# Patient Record
Sex: MALE | Race: WHITE | NOT HISPANIC OR LATINO
[De-identification: names, ages, dates, MRNs, and addresses within clinical notes are randomized per-mention and may not be internally consistent; named-entity substitution may affect disease eponyms.]

---

## 2017-05-09 ENCOUNTER — APPOINTMENT (OUTPATIENT)
Dept: OTOLARYNGOLOGY | Facility: CLINIC | Age: 67
End: 2017-05-09

## 2017-05-09 VITALS
DIASTOLIC BLOOD PRESSURE: 81 MMHG | RESPIRATION RATE: 16 BRPM | WEIGHT: 187 LBS | HEIGHT: 69 IN | SYSTOLIC BLOOD PRESSURE: 138 MMHG | BODY MASS INDEX: 27.7 KG/M2 | HEART RATE: 71 BPM

## 2017-05-09 RX ORDER — MULTIVIT-MIN/FA/LYCOPEN/LUTEIN .4-300-25
TABLET ORAL
Refills: 0 | Status: ACTIVE | COMMUNITY

## 2017-05-09 RX ORDER — CLOBETASOL PROPIONATE 0.05 G/100ML
0.05 LOTION TOPICAL
Refills: 0 | Status: ACTIVE | COMMUNITY

## 2017-05-10 ENCOUNTER — FORM ENCOUNTER (OUTPATIENT)
Age: 67
End: 2017-05-10

## 2017-05-11 ENCOUNTER — APPOINTMENT (OUTPATIENT)
Dept: CT IMAGING | Facility: CLINIC | Age: 67
End: 2017-05-11

## 2017-05-11 ENCOUNTER — OUTPATIENT (OUTPATIENT)
Dept: OUTPATIENT SERVICES | Facility: HOSPITAL | Age: 67
LOS: 1 days | End: 2017-05-11
Payer: MEDICARE

## 2017-05-11 DIAGNOSIS — Z98.52 VASECTOMY STATUS: Chronic | ICD-10-CM

## 2017-05-11 DIAGNOSIS — C06.9 MALIGNANT NEOPLASM OF MOUTH, UNSPECIFIED: ICD-10-CM

## 2017-05-11 DIAGNOSIS — Z98.89 OTHER SPECIFIED POSTPROCEDURAL STATES: Chronic | ICD-10-CM

## 2017-05-11 PROCEDURE — 82565 ASSAY OF CREATININE: CPT

## 2017-05-11 PROCEDURE — 70491 CT SOFT TISSUE NECK W/DYE: CPT

## 2017-05-23 ENCOUNTER — APPOINTMENT (OUTPATIENT)
Dept: OTOLARYNGOLOGY | Facility: CLINIC | Age: 67
End: 2017-05-23

## 2017-05-23 VITALS
HEART RATE: 75 BPM | HEIGHT: 69 IN | SYSTOLIC BLOOD PRESSURE: 118 MMHG | DIASTOLIC BLOOD PRESSURE: 72 MMHG | RESPIRATION RATE: 16 BRPM | BODY MASS INDEX: 27.7 KG/M2 | WEIGHT: 187 LBS

## 2017-05-24 ENCOUNTER — OUTPATIENT (OUTPATIENT)
Dept: OUTPATIENT SERVICES | Facility: HOSPITAL | Age: 67
LOS: 1 days | End: 2017-05-24
Payer: MEDICARE

## 2017-05-24 VITALS
HEART RATE: 60 BPM | RESPIRATION RATE: 14 BRPM | SYSTOLIC BLOOD PRESSURE: 120 MMHG | TEMPERATURE: 97 F | HEIGHT: 67.75 IN | WEIGHT: 186.07 LBS | DIASTOLIC BLOOD PRESSURE: 68 MMHG

## 2017-05-24 DIAGNOSIS — C06.9 MALIGNANT NEOPLASM OF MOUTH, UNSPECIFIED: ICD-10-CM

## 2017-05-24 DIAGNOSIS — Z98.89 OTHER SPECIFIED POSTPROCEDURAL STATES: Chronic | ICD-10-CM

## 2017-05-24 DIAGNOSIS — Z98.890 OTHER SPECIFIED POSTPROCEDURAL STATES: Chronic | ICD-10-CM

## 2017-05-24 DIAGNOSIS — Z98.52 VASECTOMY STATUS: Chronic | ICD-10-CM

## 2017-05-24 DIAGNOSIS — R06.83 SNORING: ICD-10-CM

## 2017-05-24 DIAGNOSIS — Z91.040 LATEX ALLERGY STATUS: ICD-10-CM

## 2017-05-24 LAB
BUN SERPL-MCNC: 15 MG/DL — SIGNIFICANT CHANGE UP (ref 7–23)
CALCIUM SERPL-MCNC: 9.3 MG/DL — SIGNIFICANT CHANGE UP (ref 8.4–10.5)
CHLORIDE SERPL-SCNC: 101 MMOL/L — SIGNIFICANT CHANGE UP (ref 98–107)
CO2 SERPL-SCNC: 28 MMOL/L — SIGNIFICANT CHANGE UP (ref 22–31)
CREAT SERPL-MCNC: 0.88 MG/DL — SIGNIFICANT CHANGE UP (ref 0.5–1.3)
GLUCOSE SERPL-MCNC: 80 MG/DL — SIGNIFICANT CHANGE UP (ref 70–99)
HCT VFR BLD CALC: 44.4 % — SIGNIFICANT CHANGE UP (ref 39–50)
HGB BLD-MCNC: 14.1 G/DL — SIGNIFICANT CHANGE UP (ref 13–17)
MCHC RBC-ENTMCNC: 29.4 PG — SIGNIFICANT CHANGE UP (ref 27–34)
MCHC RBC-ENTMCNC: 31.8 % — LOW (ref 32–36)
MCV RBC AUTO: 92.7 FL — SIGNIFICANT CHANGE UP (ref 80–100)
PLATELET # BLD AUTO: 201 K/UL — SIGNIFICANT CHANGE UP (ref 150–400)
PMV BLD: 11.3 FL — SIGNIFICANT CHANGE UP (ref 7–13)
POTASSIUM SERPL-MCNC: 4.2 MMOL/L — SIGNIFICANT CHANGE UP (ref 3.5–5.3)
POTASSIUM SERPL-SCNC: 4.2 MMOL/L — SIGNIFICANT CHANGE UP (ref 3.5–5.3)
RBC # BLD: 4.79 M/UL — SIGNIFICANT CHANGE UP (ref 4.2–5.8)
RBC # FLD: 15 % — HIGH (ref 10.3–14.5)
SODIUM SERPL-SCNC: 143 MMOL/L — SIGNIFICANT CHANGE UP (ref 135–145)
WBC # BLD: 6.53 K/UL — SIGNIFICANT CHANGE UP (ref 3.8–10.5)
WBC # FLD AUTO: 6.53 K/UL — SIGNIFICANT CHANGE UP (ref 3.8–10.5)

## 2017-05-24 PROCEDURE — 93010 ELECTROCARDIOGRAM REPORT: CPT

## 2017-05-24 NOTE — H&P PST ADULT - LYMPHATIC
posterior cervical R/supraclavicular R/posterior cervical L/anterior cervical L/supraclavicular L/anterior cervical R

## 2017-05-24 NOTE — H&P PST ADULT - NEGATIVE GENERAL GENITOURINARY SYMPTOMS
no renal colic/no flank pain R/normal urinary frequency/no bladder infections/no dysuria/no flank pain L

## 2017-05-24 NOTE — H&P PST ADULT - HISTORY OF PRESENT ILLNESS
67y/o male presents for preop eval for scheduled excision of lesion of submucosa on 6/7/2017.  Pt states self detected few months ago, had it evaluated by dentist and was referred to ENT.  Recent biopsy positive for malignancy.

## 2017-05-24 NOTE — H&P PST ADULT - PROBLEM SELECTOR PLAN 1
scheduled excision of lesion of submucosa on 6/7/2017.   preop instruction, gi prophylaxis given  pt verbalized understanding

## 2017-05-24 NOTE — H&P PST ADULT - NSANTHOSAYNRD_GEN_A_CORE
No. SEBASTIEN screening performed.  STOP BANG Legend: 0-2 = LOW Risk; 3-4 = INTERMEDIATE Risk; 5-8 = HIGH Risk

## 2017-05-24 NOTE — H&P PST ADULT - FAMILY HISTORY
Father  Still living? No  Family history of Parkinson's disease, Age at diagnosis: Age Unknown     Sibling  Still living? Yes, Estimated age: 51-60  Family history of leukemia, Age at diagnosis: Age Unknown

## 2017-06-07 ENCOUNTER — RESULT REVIEW (OUTPATIENT)
Age: 67
End: 2017-06-07

## 2017-06-07 ENCOUNTER — APPOINTMENT (OUTPATIENT)
Dept: OTOLARYNGOLOGY | Facility: HOSPITAL | Age: 67
End: 2017-06-07

## 2017-06-07 ENCOUNTER — INPATIENT (INPATIENT)
Facility: HOSPITAL | Age: 67
LOS: 0 days | Discharge: HOME | End: 2017-06-08
Attending: OTOLARYNGOLOGY | Admitting: OTOLARYNGOLOGY
Payer: MEDICARE

## 2017-06-07 VITALS
HEART RATE: 69 BPM | OXYGEN SATURATION: 96 % | TEMPERATURE: 37 F | DIASTOLIC BLOOD PRESSURE: 74 MMHG | RESPIRATION RATE: 14 BRPM | SYSTOLIC BLOOD PRESSURE: 140 MMHG | HEIGHT: 67.75 IN | WEIGHT: 186.07 LBS

## 2017-06-07 DIAGNOSIS — C06.9 MALIGNANT NEOPLASM OF MOUTH, UNSPECIFIED: ICD-10-CM

## 2017-06-07 DIAGNOSIS — Z98.89 OTHER SPECIFIED POSTPROCEDURAL STATES: Chronic | ICD-10-CM

## 2017-06-07 DIAGNOSIS — Z98.890 OTHER SPECIFIED POSTPROCEDURAL STATES: Chronic | ICD-10-CM

## 2017-06-07 DIAGNOSIS — Z98.52 VASECTOMY STATUS: Chronic | ICD-10-CM

## 2017-06-07 PROCEDURE — 88307 TISSUE EXAM BY PATHOLOGIST: CPT | Mod: 26

## 2017-06-07 PROCEDURE — 88332 PATH CONSLTJ SURG EA ADD BLK: CPT | Mod: 26

## 2017-06-07 PROCEDURE — 40810 EXCISION OF MOUTH LESION: CPT | Mod: GC

## 2017-06-07 PROCEDURE — 15120 SPLT AGRFT F/S/N/H/F/G/M 1ST: CPT | Mod: LT,GC

## 2017-06-07 PROCEDURE — 88331 PATH CONSLTJ SURG 1 BLK 1SPC: CPT | Mod: 26

## 2017-06-07 RX ORDER — ACETAMINOPHEN 500 MG
650 TABLET ORAL EVERY 6 HOURS
Qty: 0 | Refills: 0 | Status: DISCONTINUED | OUTPATIENT
Start: 2017-06-07 | End: 2017-06-08

## 2017-06-07 RX ORDER — OXYCODONE HYDROCHLORIDE 5 MG/1
1 TABLET ORAL
Qty: 0 | Refills: 0 | DISCHARGE
Start: 2017-06-07

## 2017-06-07 RX ORDER — SODIUM CHLORIDE 9 MG/ML
1000 INJECTION, SOLUTION INTRAVENOUS
Qty: 0 | Refills: 0 | Status: DISCONTINUED | OUTPATIENT
Start: 2017-06-07 | End: 2017-06-08

## 2017-06-07 RX ORDER — ONDANSETRON 8 MG/1
4 TABLET, FILM COATED ORAL ONCE
Qty: 0 | Refills: 0 | Status: DISCONTINUED | OUTPATIENT
Start: 2017-06-07 | End: 2017-06-07

## 2017-06-07 RX ORDER — HYDROMORPHONE HYDROCHLORIDE 2 MG/ML
0.5 INJECTION INTRAMUSCULAR; INTRAVENOUS; SUBCUTANEOUS
Qty: 0 | Refills: 0 | Status: DISCONTINUED | OUTPATIENT
Start: 2017-06-07 | End: 2017-06-07

## 2017-06-07 RX ORDER — HYDROMORPHONE HYDROCHLORIDE 2 MG/ML
0.25 INJECTION INTRAMUSCULAR; INTRAVENOUS; SUBCUTANEOUS
Qty: 0 | Refills: 0 | Status: DISCONTINUED | OUTPATIENT
Start: 2017-06-07 | End: 2017-06-07

## 2017-06-07 RX ORDER — OXYCODONE HYDROCHLORIDE 5 MG/1
1 TABLET ORAL
Qty: 42 | Refills: 0
Start: 2017-06-07 | End: 2017-06-14

## 2017-06-07 RX ORDER — SODIUM CHLORIDE 9 MG/ML
1000 INJECTION, SOLUTION INTRAVENOUS
Qty: 0 | Refills: 0 | Status: DISCONTINUED | OUTPATIENT
Start: 2017-06-07 | End: 2017-06-07

## 2017-06-07 RX ORDER — OXYCODONE HYDROCHLORIDE 5 MG/1
5 TABLET ORAL ONCE
Qty: 0 | Refills: 0 | Status: DISCONTINUED | OUTPATIENT
Start: 2017-06-07 | End: 2017-06-08

## 2017-06-07 RX ORDER — GABAPENTIN 400 MG/1
300 CAPSULE ORAL AT BEDTIME
Qty: 0 | Refills: 0 | Status: DISCONTINUED | OUTPATIENT
Start: 2017-06-07 | End: 2017-06-08

## 2017-06-07 RX ADMIN — SODIUM CHLORIDE 100 MILLILITER(S): 9 INJECTION, SOLUTION INTRAVENOUS at 23:13

## 2017-06-07 RX ADMIN — GABAPENTIN 300 MILLIGRAM(S): 400 CAPSULE ORAL at 23:13

## 2017-06-07 RX ADMIN — SODIUM CHLORIDE 100 MILLILITER(S): 9 INJECTION, SOLUTION INTRAVENOUS at 19:03

## 2017-06-07 RX ADMIN — SODIUM CHLORIDE 100 MILLILITER(S): 9 INJECTION, SOLUTION INTRAVENOUS at 16:15

## 2017-06-07 NOTE — DISCHARGE NOTE ADULT - MEDICATION SUMMARY - MEDICATIONS TO TAKE
I will START or STAY ON the medications listed below when I get home from the hospital:    glucosamine/chondrotin 1500 mg/1200 mg 1 cap orally daily last dose 5/31  --     -- Indication: For Home    acetaminophen-oxycodone 325 mg-5 mg oral tablet  -- 1 tab(s) by mouth every 4 hours, As needed, Moderate Pain (4 - 6)  -- Indication: For pain    acetaminophen-oxycodone 325 mg-5 mg oral tablet  -- 1 tab(s) by mouth every 4 hours, As needed, Moderate Pain (4 - 6) MDD:6 tab  -- Indication: For pain    aspirin 81 mg oral tablet  -- 1 tab(s) by mouth once a day  last dose 5/31  -- Indication: For Home    Neurontin 300 mg oral capsule  -- 1 cap(s) by mouth once a day in pm  -- Indication: For Home    clobetasol 0.05% topical cream  -- Apply on skin to affected area 2 times a day, As Needed  -- Indication: For Home    L-Arginine 1000 mg oral tablet  -- 1 tab(s) by mouth once a day last dose 5/31  -- Indication: For Home    Vitamin B-12 1000 mcg oral tablet  -- 1 tab(s) by mouth once a day  -- Indication: For Home

## 2017-06-07 NOTE — DISCHARGE NOTE ADULT - INSTRUCTIONS
Please NOTIFY MD for any of the following s/s: S/S infection (Fever >100.4, chills, increased redness, increased bleeding, pus-like drainage from incision line), uncontrolled pain not relieved by pain medications, persistent nausea/vomiting or inability to tolerate diet. No heavy lifting; No driving while taking narcotic pain medications. Please drink 6-8 glasses of water daily to stay hydrated.

## 2017-06-07 NOTE — DISCHARGE NOTE ADULT - CARE PLAN
Principal Discharge DX:	Malignant neoplasm of mouth, unspecified  Goal:	resect malignancy  Instructions for follow-up, activity and diet:	full liquid diet for 1 week; no straws  gently gargle with salt water twice daily

## 2017-06-07 NOTE — PATIENT PROFILE ADULT. - VISION (WITH CORRECTIVE LENSES IF THE PATIENT USUALLY WEARS THEM):
Normal vision: sees adequately in most situations; can see medication labels, newsprint wears glasses/Normal vision: sees adequately in most situations; can see medication labels, newsprint

## 2017-06-07 NOTE — DISCHARGE NOTE ADULT - CARE PROVIDER_API CALL
Cliff Perez), [facility]  [address] [place] [address]  Phone: [phone]  Fax: [phone]

## 2017-06-07 NOTE — DISCHARGE NOTE ADULT - CONDITIONS AT DISCHARGE
Pt A&Ox4. Vs stable. Pt OOB, ambulating, and voiding adequately. Pain well controlled. Patient is alert and oriented to person, place, time and situation. Patient VS were stable. Patient is voiding. Left upper leg tegaderm in place. Mid lower inner lip incision. IV is DC'd. Patient denies pain. Patient discharge to home. Left with family. Discharge instructions and education given and understood.

## 2017-06-07 NOTE — DISCHARGE NOTE ADULT - PATIENT PORTAL LINK FT
“You can access the FollowHealth Patient Portal, offered by [facility], by registering with the following website: [email or web address]”

## 2017-06-08 ENCOUNTER — TRANSCRIPTION ENCOUNTER (OUTPATIENT)
Age: 67
End: 2017-06-08

## 2017-06-08 VITALS
RESPIRATION RATE: 16 BRPM | HEART RATE: 67 BPM | SYSTOLIC BLOOD PRESSURE: 101 MMHG | DIASTOLIC BLOOD PRESSURE: 62 MMHG | TEMPERATURE: 98 F | OXYGEN SATURATION: 95 %

## 2017-06-13 ENCOUNTER — RESULT REVIEW (OUTPATIENT)
Age: 67
End: 2017-06-13

## 2017-06-13 ENCOUNTER — APPOINTMENT (OUTPATIENT)
Dept: OTOLARYNGOLOGY | Facility: CLINIC | Age: 67
End: 2017-06-13

## 2017-06-13 VITALS
HEIGHT: 69 IN | WEIGHT: 180 LBS | BODY MASS INDEX: 26.66 KG/M2 | SYSTOLIC BLOOD PRESSURE: 134 MMHG | HEART RATE: 68 BPM | DIASTOLIC BLOOD PRESSURE: 72 MMHG

## 2017-06-13 LAB — SURGICAL PATHOLOGY STUDY: SIGNIFICANT CHANGE UP

## 2017-06-13 RX ORDER — ARGININE HCL 1000 MG
1000 TABLET ORAL
Refills: 0 | Status: ACTIVE | COMMUNITY

## 2017-06-27 ENCOUNTER — APPOINTMENT (OUTPATIENT)
Dept: OTOLARYNGOLOGY | Facility: CLINIC | Age: 67
End: 2017-06-27

## 2017-06-27 VITALS — SYSTOLIC BLOOD PRESSURE: 150 MMHG | DIASTOLIC BLOOD PRESSURE: 78 MMHG | HEART RATE: 68 BPM

## 2017-08-08 ENCOUNTER — APPOINTMENT (OUTPATIENT)
Dept: OTOLARYNGOLOGY | Facility: CLINIC | Age: 67
End: 2017-08-08
Payer: MEDICARE

## 2017-08-08 VITALS
BODY MASS INDEX: 27.4 KG/M2 | DIASTOLIC BLOOD PRESSURE: 77 MMHG | HEART RATE: 65 BPM | HEIGHT: 69 IN | SYSTOLIC BLOOD PRESSURE: 125 MMHG | WEIGHT: 185 LBS

## 2017-08-08 PROCEDURE — 99024 POSTOP FOLLOW-UP VISIT: CPT

## 2017-11-28 ENCOUNTER — APPOINTMENT (OUTPATIENT)
Dept: OTOLARYNGOLOGY | Facility: CLINIC | Age: 67
End: 2017-11-28
Payer: MEDICARE

## 2017-11-28 VITALS
WEIGHT: 185 LBS | DIASTOLIC BLOOD PRESSURE: 88 MMHG | RESPIRATION RATE: 16 BRPM | BODY MASS INDEX: 27.4 KG/M2 | HEIGHT: 69 IN | SYSTOLIC BLOOD PRESSURE: 149 MMHG | HEART RATE: 75 BPM

## 2017-11-28 PROCEDURE — 99213 OFFICE O/P EST LOW 20 MIN: CPT

## 2018-03-06 ENCOUNTER — APPOINTMENT (OUTPATIENT)
Dept: OTOLARYNGOLOGY | Facility: CLINIC | Age: 68
End: 2018-03-06
Payer: MEDICARE

## 2018-03-06 VITALS
HEIGHT: 69 IN | BODY MASS INDEX: 27.4 KG/M2 | HEART RATE: 71 BPM | DIASTOLIC BLOOD PRESSURE: 76 MMHG | SYSTOLIC BLOOD PRESSURE: 117 MMHG | WEIGHT: 185 LBS

## 2018-03-06 PROCEDURE — 99214 OFFICE O/P EST MOD 30 MIN: CPT

## 2018-06-19 ENCOUNTER — APPOINTMENT (OUTPATIENT)
Dept: OTOLARYNGOLOGY | Facility: CLINIC | Age: 68
End: 2018-06-19
Payer: MEDICARE

## 2018-06-19 VITALS
HEART RATE: 71 BPM | SYSTOLIC BLOOD PRESSURE: 150 MMHG | HEIGHT: 69 IN | DIASTOLIC BLOOD PRESSURE: 78 MMHG | WEIGHT: 200 LBS | BODY MASS INDEX: 29.62 KG/M2

## 2018-06-19 PROCEDURE — 99214 OFFICE O/P EST MOD 30 MIN: CPT

## 2018-09-18 ENCOUNTER — APPOINTMENT (OUTPATIENT)
Dept: OTOLARYNGOLOGY | Facility: CLINIC | Age: 68
End: 2018-09-18
Payer: MEDICARE

## 2018-09-18 VITALS
HEIGHT: 69 IN | HEART RATE: 66 BPM | RESPIRATION RATE: 16 BRPM | DIASTOLIC BLOOD PRESSURE: 78 MMHG | SYSTOLIC BLOOD PRESSURE: 120 MMHG | BODY MASS INDEX: 29.62 KG/M2 | WEIGHT: 200 LBS

## 2018-09-18 PROCEDURE — 99214 OFFICE O/P EST MOD 30 MIN: CPT

## 2018-09-19 PROBLEM — C06.9 MALIGNANT NEOPLASM OF MOUTH, UNSPECIFIED: Chronic | Status: ACTIVE | Noted: 2017-05-24

## 2019-01-08 ENCOUNTER — APPOINTMENT (OUTPATIENT)
Dept: OTOLARYNGOLOGY | Facility: CLINIC | Age: 69
End: 2019-01-08
Payer: MEDICARE

## 2019-01-08 VITALS
HEIGHT: 69 IN | HEART RATE: 67 BPM | SYSTOLIC BLOOD PRESSURE: 147 MMHG | BODY MASS INDEX: 29.62 KG/M2 | WEIGHT: 200 LBS | DIASTOLIC BLOOD PRESSURE: 79 MMHG

## 2019-01-08 PROCEDURE — 99214 OFFICE O/P EST MOD 30 MIN: CPT

## 2019-01-08 NOTE — REASON FOR VISIT
[Subsequent Evaluation] : a subsequent evaluation for [FreeTextEntry2] : SCCA mandibular gingivolabial sulcus

## 2019-01-08 NOTE — CONSULT LETTER
[Dear  ___] : Dear  [unfilled], [Courtesy Letter:] : I had the pleasure of seeing your patient, [unfilled], in my office today. [Please see my note below.] : Please see my note below. [Sincerely,] : Sincerely, [FreeTextEntry2] : Jeffery Gautam DDS ([place], [place]) [FreeTextEntry3] : Cliff Perez MD

## 2019-01-08 NOTE — HISTORY OF PRESENT ILLNESS
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 pt states lower lip remains numb.  pt denies pain.  now 1.5 years out.

## 2019-04-01 NOTE — H&P PST ADULT - PMH
Started on atorvastatin 80 mg  
Improved with addition of bb  following  
Trop peaked at 2.5  No further chest pain  Cardiology following  Cath pending  Echocardiogram pending  Continue aspirin atorvastatin and metoprolol  
Contact dermatitis    Eczema    Lumbar herniated disc    Malignant neoplasm of mouth, unspecified

## 2019-04-23 ENCOUNTER — APPOINTMENT (OUTPATIENT)
Dept: OTOLARYNGOLOGY | Facility: CLINIC | Age: 69
End: 2019-04-23
Payer: MEDICARE

## 2019-04-23 VITALS
WEIGHT: 200 LBS | RESPIRATION RATE: 16 BRPM | HEART RATE: 60 BPM | BODY MASS INDEX: 29.62 KG/M2 | HEIGHT: 69 IN | DIASTOLIC BLOOD PRESSURE: 76 MMHG | SYSTOLIC BLOOD PRESSURE: 116 MMHG

## 2019-04-23 PROCEDURE — 99214 OFFICE O/P EST MOD 30 MIN: CPT

## 2019-04-23 NOTE — CONSULT LETTER
[Dear  ___] : Dear  [unfilled], [Courtesy Letter:] : I had the pleasure of seeing your patient, [unfilled], in my office today. [Please see my note below.] : Please see my note below. [Sincerely,] : Sincerely, [FreeTextEntry3] : Cliff Perez MD [FreeTextEntry2] : Jeffery Gautam DDS ([place], [place])

## 2019-04-23 NOTE — HISTORY OF PRESENT ILLNESS
[None] : The patient is currently asymptomatic. [de-identified] : Mr. 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  He is now 1 year and 10 months from surgery.  He is doing well and without any complaints and notes that everything is the same.  Denies any pain or discomfort. [Neck Mass] : no neck mass [Difficulty Swallowing] : no difficulty swallowing [Painful Swallowing] : no painful swallowing

## 2019-07-08 ENCOUNTER — TRANSCRIPTION ENCOUNTER (OUTPATIENT)
Age: 69
End: 2019-07-08

## 2019-08-14 ENCOUNTER — APPOINTMENT (OUTPATIENT)
Dept: RADIOLOGY | Facility: CLINIC | Age: 69
End: 2019-08-14
Payer: MEDICARE

## 2019-08-14 ENCOUNTER — OUTPATIENT (OUTPATIENT)
Dept: OUTPATIENT SERVICES | Facility: HOSPITAL | Age: 69
LOS: 1 days | End: 2019-08-14
Payer: MEDICARE

## 2019-08-14 DIAGNOSIS — Z98.890 OTHER SPECIFIED POSTPROCEDURAL STATES: Chronic | ICD-10-CM

## 2019-08-14 DIAGNOSIS — Z98.52 VASECTOMY STATUS: Chronic | ICD-10-CM

## 2019-08-14 DIAGNOSIS — Z98.89 OTHER SPECIFIED POSTPROCEDURAL STATES: Chronic | ICD-10-CM

## 2019-08-14 PROCEDURE — 73130 X-RAY EXAM OF HAND: CPT | Mod: 26,RT

## 2019-08-14 PROCEDURE — 73130 X-RAY EXAM OF HAND: CPT

## 2019-10-29 ENCOUNTER — APPOINTMENT (OUTPATIENT)
Dept: OTOLARYNGOLOGY | Facility: CLINIC | Age: 69
End: 2019-10-29
Payer: MEDICARE

## 2019-10-29 VITALS
HEART RATE: 71 BPM | SYSTOLIC BLOOD PRESSURE: 132 MMHG | WEIGHT: 200 LBS | BODY MASS INDEX: 29.62 KG/M2 | DIASTOLIC BLOOD PRESSURE: 77 MMHG | HEIGHT: 69 IN

## 2019-10-29 PROCEDURE — 99214 OFFICE O/P EST MOD 30 MIN: CPT

## 2019-10-29 NOTE — HISTORY OF PRESENT ILLNESS
[de-identified] : SCCA in situ mandibular gingivolabial sulcus. Pt has no complaints. surgery was June 2017.

## 2019-10-29 NOTE — CONSULT LETTER
[Dear  ___] : Dear  [unfilled], [Courtesy Letter:] : I had the pleasure of seeing your patient, [unfilled], in my office today. [Please see my note below.] : Please see my note below. [Sincerely,] : Sincerely, [FreeTextEntry2] : Jeffery Gautam DDS ([place], [place]) [FreeTextEntry3] : Cliff Perez MD

## 2019-10-29 NOTE — REASON FOR VISIT
[Subsequent Evaluation] : a subsequent evaluation for [FreeTextEntry2] : SCCA in situ mandibular gingivolabial sulcus

## 2019-12-03 ENCOUNTER — APPOINTMENT (OUTPATIENT)
Dept: OTOLARYNGOLOGY | Facility: CLINIC | Age: 69
End: 2019-12-03

## 2020-02-18 ENCOUNTER — APPOINTMENT (OUTPATIENT)
Dept: OTOLARYNGOLOGY | Facility: CLINIC | Age: 70
End: 2020-02-18
Payer: MEDICARE

## 2020-02-18 PROCEDURE — 99214 OFFICE O/P EST MOD 30 MIN: CPT

## 2020-02-18 NOTE — CONSULT LETTER
[Dear  ___] : Dear  [unfilled], [Courtesy Letter:] : I had the pleasure of seeing your patient, [unfilled], in my office today. [Please see my note below.] : Please see my note below. [Sincerely,] : Sincerely, [FreeTextEntry2] : Jeffery Gautam MD ([place], [place]) [FreeTextEntry3] : Cliff Perez MD

## 2020-02-18 NOTE — HISTORY OF PRESENT ILLNESS
[de-identified] : pt in F/U SP exc sup scca ging labial sulcus anterior.  He is now 2 yrs 8 mo out.

## 2020-06-16 ENCOUNTER — APPOINTMENT (OUTPATIENT)
Dept: OTOLARYNGOLOGY | Facility: CLINIC | Age: 70
End: 2020-06-16
Payer: MEDICARE

## 2020-06-16 VITALS
BODY MASS INDEX: 31.1 KG/M2 | WEIGHT: 210 LBS | SYSTOLIC BLOOD PRESSURE: 130 MMHG | HEART RATE: 67 BPM | DIASTOLIC BLOOD PRESSURE: 76 MMHG | HEIGHT: 69 IN | RESPIRATION RATE: 16 BRPM

## 2020-06-16 PROCEDURE — 99214 OFFICE O/P EST MOD 30 MIN: CPT

## 2020-06-16 NOTE — REASON FOR VISIT
[Subsequent Evaluation] : a subsequent evaluation for [FreeTextEntry2] : SCCa of mandibular gingivolabial

## 2020-06-16 NOTE — HISTORY OF PRESENT ILLNESS
[None] : No associated symptoms are reported. [de-identified] : Mr. Carrillo is a  68 yo male who is here for follow up. History of  SP exc sup scca ging labial sulcus anterior  on 6/7/2017.  He is now 3 yrs out. \par Denies dysphagia, dyspnea, dysphonia. Denies any pain or discomfort. Denies noting any new lesions in oral area.\par Denies recent infection, fever, cough or chills.\par

## 2020-09-23 ENCOUNTER — TRANSCRIPTION ENCOUNTER (OUTPATIENT)
Age: 70
End: 2020-09-23

## 2020-10-20 ENCOUNTER — APPOINTMENT (OUTPATIENT)
Dept: OTOLARYNGOLOGY | Facility: CLINIC | Age: 70
End: 2020-10-20
Payer: MEDICARE

## 2020-10-20 VITALS
HEIGHT: 69 IN | TEMPERATURE: 97.7 F | WEIGHT: 210 LBS | RESPIRATION RATE: 16 BRPM | BODY MASS INDEX: 31.1 KG/M2 | SYSTOLIC BLOOD PRESSURE: 160 MMHG | DIASTOLIC BLOOD PRESSURE: 90 MMHG

## 2020-10-20 PROCEDURE — 99214 OFFICE O/P EST MOD 30 MIN: CPT

## 2020-10-20 NOTE — CONSULT LETTER
[Dear  ___] : Dear  [unfilled], [Courtesy Letter:] : I had the pleasure of seeing your patient, [unfilled], in my office today. [Please see my note below.] : Please see my note below. [Sincerely,] : Sincerely, [FreeTextEntry3] : Cliff Perez MD [FreeTextEntry2] : Jeffery Gautam DDS ([place], [place])

## 2020-10-20 NOTE — HISTORY OF PRESENT ILLNESS
[None] : No associated symptoms are reported. [de-identified] : Mr. Carrillo is a  70 yo male who is here for follow up. History of  SP exc sup scca ging labial sulcus anterior  on 6/7/2017.  He is now 3 yrs and 4 months out. \par Denies dysphagia, dyspnea, dysphonia. Denies any pain or discomfort. Denies noting any new lesions in oral area.\par Denies recent cough, fever, chill, or changes in taste or smell \par  [Fever] : no fever

## 2021-02-26 NOTE — DISCHARGE NOTE ADULT - PRINCIPAL DIAGNOSIS
CASE MANAGEMENT NOTE:    Admission Date:  2/25/2021 Coco Monroe is a 38 y.o.  male    Admitted for : DKA, type 2, not at [facility]) [E11.10]    Met with:  Patient    PCP:  Ashley Grubbs                                Insurance:  Reuben Gomez      Current Residence/ Living Arrangements:  independently at home             Current Services PTA:  No    Is patient agreeable to VNS: No    Freedom of choice provided:  NA    List of [address] provided: NA    VNS chosen:  No    DME:  Glucometer and supplies    Home Oxygen: No    Nebulizer: No    CPAP/BIPAP: No    Supplier: N/A    Potential Assistance Needed: No    SNF needed: No    Freedom of choice and list provided: NA    Pharmacy:  [facility]       Does Patient want to use MEDS to BEDS? No    Is patient currently receiving oral anticoagulation therapy? No    Is the Patient an ONEIL [facility] with Readmission Risk Score greater than 14%? No  If yes, pt needs a follow up appointment made within 7 days. Family Members/Caregivers that pt would like involved in their care:    Yes    If yes, list name here:  Spouse geraldine    Transportation Provider:  Family             Is patient in Isolation/One on One/Altered Mental Status? No  If yes, skip next question. If no, would they like an I-Pad to  use? No  If yes, call [phone]. Discharge Plan:  2/26/21 - Reuben Gomez - Patient is from home. Has glucometer and supplies, Denies need for VNS. States he is leaving today at 3:00p.m. no matter what as he has to be to work at [employer] Remains in insulin gtt, glucose is 172, cre 0.63. Plan is for home with no needs. Will follow . //pf                 Electronically signed by: Fabrice Akins RN on 2/26/2021 at 1:45 PM Malignant neoplasm of mouth, unspecified

## 2021-03-02 ENCOUNTER — APPOINTMENT (OUTPATIENT)
Dept: OTOLARYNGOLOGY | Facility: CLINIC | Age: 71
End: 2021-03-02
Payer: MEDICARE

## 2021-03-02 VITALS
BODY MASS INDEX: 31.84 KG/M2 | WEIGHT: 215 LBS | HEART RATE: 69 BPM | SYSTOLIC BLOOD PRESSURE: 120 MMHG | TEMPERATURE: 97.9 F | DIASTOLIC BLOOD PRESSURE: 76 MMHG | HEIGHT: 69 IN

## 2021-03-02 PROCEDURE — 99213 OFFICE O/P EST LOW 20 MIN: CPT

## 2021-03-02 NOTE — HISTORY OF PRESENT ILLNESS
[None] : No associated symptoms are reported. [de-identified] : Mr. Carrillo is a  70 yo male who is here for follow up. History of  SP exc sup scca ging labial sulcus anterior  on 6/7/2017.  He is now 3 yrs and 9  months out. \par Denies dysphagia, dyspnea, dysphonia. Denies any pain or discomfort. Denies noting any new lesions in oral area.\par Denies recent cough, fever, chill, or changes in taste or smell \par

## 2021-03-02 NOTE — CONSULT LETTER
[Dear  ___] : Dear  [unfilled], [Courtesy Letter:] : I had the pleasure of seeing your patient, [unfilled], in my office today. [Please see my note below.] : Please see my note below. [Sincerely,] : Sincerely, [FreeTextEntry2] : Jeffery Gautam DDS ([place], [place]) [FreeTextEntry3] : Cliff Perez MD, FACS\par \par    [facility] [place]\par Associate Chair\par    Department of Otolaryngology\par \par Professor\par Otolaryngology & Molecular Medicine\par [employer]\par

## 2021-09-07 ENCOUNTER — APPOINTMENT (OUTPATIENT)
Dept: OTOLARYNGOLOGY | Facility: CLINIC | Age: 71
End: 2021-09-07

## 2021-10-26 ENCOUNTER — APPOINTMENT (OUTPATIENT)
Dept: OTOLARYNGOLOGY | Facility: CLINIC | Age: 71
End: 2021-10-26
Payer: MEDICARE

## 2021-10-26 VITALS
SYSTOLIC BLOOD PRESSURE: 144 MMHG | HEIGHT: 69 IN | WEIGHT: 200 LBS | BODY MASS INDEX: 29.62 KG/M2 | HEART RATE: 69 BPM | OXYGEN SATURATION: 95 % | DIASTOLIC BLOOD PRESSURE: 83 MMHG | TEMPERATURE: 97.3 F

## 2021-10-26 PROCEDURE — 99213 OFFICE O/P EST LOW 20 MIN: CPT

## 2022-02-08 ENCOUNTER — APPOINTMENT (OUTPATIENT)
Dept: OTOLARYNGOLOGY | Facility: CLINIC | Age: 72
End: 2022-02-08
Payer: MEDICARE

## 2022-02-08 VITALS
HEIGHT: 69 IN | OXYGEN SATURATION: 95 % | BODY MASS INDEX: 29.62 KG/M2 | HEART RATE: 75 BPM | SYSTOLIC BLOOD PRESSURE: 166 MMHG | WEIGHT: 200 LBS | DIASTOLIC BLOOD PRESSURE: 90 MMHG

## 2022-02-08 PROCEDURE — 99214 OFFICE O/P EST MOD 30 MIN: CPT

## 2022-02-08 NOTE — HISTORY OF PRESENT ILLNESS
[None] : No associated symptoms are reported. [de-identified] : Mr. Carrillo presents for follow up. History of SP exc sup scca ging labial sulcus anterior on 6/7/2017. \par 4 years and 8 months out.  dentist recenbtly noted white spot L mandib gingiva. this was incidental finding

## 2022-02-08 NOTE — CONSULT LETTER
[Dear  ___] : Dear  [unfilled], [Courtesy Letter:] : I had the pleasure of seeing your patient, [unfilled], in my office today. [Please see my note below.] : Please see my note below. [Sincerely,] : Sincerely, [FreeTextEntry2] : Eloy Miguel MD ([place], [place]) [FreeTextEntry3] : Cliff Perez MD, FACS\par \par    [facility] [place]\par Associate Chair\par    Department of Otolaryngology\par \par Professor\par Otolaryngology & Molecular Medicine\par [employer]\par  [DrLizbeth  ___] : Dr. GONZALEZ

## 2022-03-22 ENCOUNTER — OUTPATIENT (OUTPATIENT)
Dept: OUTPATIENT SERVICES | Facility: HOSPITAL | Age: 72
LOS: 1 days | End: 2022-03-22
Payer: MEDICARE

## 2022-03-22 VITALS
HEART RATE: 72 BPM | WEIGHT: 197.53 LBS | OXYGEN SATURATION: 98 % | DIASTOLIC BLOOD PRESSURE: 81 MMHG | TEMPERATURE: 98 F | HEIGHT: 67.5 IN | RESPIRATION RATE: 18 BRPM | SYSTOLIC BLOOD PRESSURE: 138 MMHG

## 2022-03-22 DIAGNOSIS — K13.21 LEUKOPLAKIA OF ORAL MUCOSA, INCLUDING TONGUE: ICD-10-CM

## 2022-03-22 DIAGNOSIS — Z98.890 OTHER SPECIFIED POSTPROCEDURAL STATES: Chronic | ICD-10-CM

## 2022-03-22 DIAGNOSIS — Z98.89 OTHER SPECIFIED POSTPROCEDURAL STATES: Chronic | ICD-10-CM

## 2022-03-22 DIAGNOSIS — Z01.818 ENCOUNTER FOR OTHER PREPROCEDURAL EXAMINATION: ICD-10-CM

## 2022-03-22 DIAGNOSIS — Z98.52 VASECTOMY STATUS: Chronic | ICD-10-CM

## 2022-03-22 LAB
ANION GAP SERPL CALC-SCNC: 9 MMOL/L — SIGNIFICANT CHANGE UP (ref 5–17)
BUN SERPL-MCNC: 12 MG/DL — SIGNIFICANT CHANGE UP (ref 7–23)
CALCIUM SERPL-MCNC: 8.7 MG/DL — SIGNIFICANT CHANGE UP (ref 8.4–10.5)
CHLORIDE SERPL-SCNC: 103 MMOL/L — SIGNIFICANT CHANGE UP (ref 96–108)
CO2 SERPL-SCNC: 31 MMOL/L — SIGNIFICANT CHANGE UP (ref 22–31)
CREAT SERPL-MCNC: 0.88 MG/DL — SIGNIFICANT CHANGE UP (ref 0.5–1.3)
EGFR: 91 ML/MIN/1.73M2 — SIGNIFICANT CHANGE UP
GLUCOSE SERPL-MCNC: 92 MG/DL — SIGNIFICANT CHANGE UP (ref 70–99)
HCT VFR BLD CALC: 45.2 % — SIGNIFICANT CHANGE UP (ref 39–50)
HGB BLD-MCNC: 14.4 G/DL — SIGNIFICANT CHANGE UP (ref 13–17)
MCHC RBC-ENTMCNC: 29.7 PG — SIGNIFICANT CHANGE UP (ref 27–34)
MCHC RBC-ENTMCNC: 31.9 GM/DL — LOW (ref 32–36)
MCV RBC AUTO: 93.2 FL — SIGNIFICANT CHANGE UP (ref 80–100)
NRBC # BLD: 0 /100 WBCS — SIGNIFICANT CHANGE UP (ref 0–0)
PLATELET # BLD AUTO: 198 K/UL — SIGNIFICANT CHANGE UP (ref 150–400)
POTASSIUM SERPL-MCNC: 3.9 MMOL/L — SIGNIFICANT CHANGE UP (ref 3.5–5.3)
POTASSIUM SERPL-SCNC: 3.9 MMOL/L — SIGNIFICANT CHANGE UP (ref 3.5–5.3)
RBC # BLD: 4.85 M/UL — SIGNIFICANT CHANGE UP (ref 4.2–5.8)
RBC # FLD: 13.9 % — SIGNIFICANT CHANGE UP (ref 10.3–14.5)
SODIUM SERPL-SCNC: 143 MMOL/L — SIGNIFICANT CHANGE UP (ref 135–145)
WBC # BLD: 6.65 K/UL — SIGNIFICANT CHANGE UP (ref 3.8–10.5)
WBC # FLD AUTO: 6.65 K/UL — SIGNIFICANT CHANGE UP (ref 3.8–10.5)

## 2022-03-22 PROCEDURE — 93010 ELECTROCARDIOGRAM REPORT: CPT | Mod: NC

## 2022-03-22 RX ORDER — SODIUM CHLORIDE 9 MG/ML
1000 INJECTION, SOLUTION INTRAVENOUS
Refills: 0 | Status: DISCONTINUED | OUTPATIENT
Start: 2022-04-18 | End: 2022-05-02

## 2022-03-22 NOTE — H&P PST ADULT - NSANTHOSAYNRD_GEN_A_CORE
neck 16 inches/No. SEBASTIEN screening performed.  STOP BANG Legend: 0-2 = LOW Risk; 3-4 = INTERMEDIATE Risk; 5-8 = HIGH Risk

## 2022-03-22 NOTE — H&P PST ADULT - NSICDXPASTMEDICALHX_GEN_ALL_CORE_FT
PAST MEDICAL HISTORY:  Contact dermatitis     Eczema     H/O neuropathy     Lumbar herniated disc     Malignant neoplasm of mouth, unspecified

## 2022-03-22 NOTE — H&P PST ADULT - HISTORY OF PRESENT ILLNESS
65y/o male presents for preop eval for scheduled excision of lesion of submucosa on 6/7/2017.  Pt states self detected few months ago, had it evaluated by dentist and was referred to ENT.  Recent biopsy positive for malignancy. 70 y/o male with PMH left leg neuropathy, herniated lumbar disc, and neoplasm of mouth presents for PST.  C/o new left mouth findings during dental exam that is recommended for surgical removal.  Felling well at [facility] today with no cough, fever or recent illness.  Scheduled for leukoplakia of oral mucosa, including tongue with Dr Perez on 04/04/2022.  COVID-19 testing scheduled for 04/01/2022

## 2022-03-22 NOTE — H&P PST ADULT - FALL HARM RISK - UNIVERSAL INTERVENTIONS
Bed in lowest position, wheels locked, appropriate side rails in place/Call bell, personal items and telephone in reach/Instruct patient to call for assistance before getting out of bed or chair/Non-slip footwear when patient is out of bed/[place]ruce to call system/Physically safe environment - no spills, clutter or unnecessary equipment/Purposeful Proactive Rounding/Room/bathroom lighting operational, light cord in reach

## 2022-03-22 NOTE — H&P PST ADULT - NSICDXFAMILYHX_GEN_ALL_CORE_FT
FAMILY HISTORY:  Father  Still living? No  Family history of Parkinson's disease, Age at diagnosis: Age Unknown    Sibling  Still living? Yes, Estimated age: 51-60  Family history of leukemia, Age at diagnosis: Age Unknown

## 2022-03-23 ENCOUNTER — TRANSCRIPTION ENCOUNTER (OUTPATIENT)
Age: 72
End: 2022-03-23

## 2022-04-15 PROCEDURE — 80048 BASIC METABOLIC PNL TOTAL CA: CPT

## 2022-04-15 PROCEDURE — G0463: CPT

## 2022-04-15 PROCEDURE — 93005 ELECTROCARDIOGRAM TRACING: CPT

## 2022-04-15 PROCEDURE — 36415 COLL VENOUS BLD VENIPUNCTURE: CPT

## 2022-04-15 PROCEDURE — 85027 COMPLETE CBC AUTOMATED: CPT

## 2022-04-17 ENCOUNTER — TRANSCRIPTION ENCOUNTER (OUTPATIENT)
Age: 72
End: 2022-04-17

## 2022-04-18 ENCOUNTER — TRANSCRIPTION ENCOUNTER (OUTPATIENT)
Age: 72
End: 2022-04-18

## 2022-04-18 ENCOUNTER — APPOINTMENT (OUTPATIENT)
Dept: OTOLARYNGOLOGY | Facility: HOSPITAL | Age: 72
End: 2022-04-18

## 2022-04-18 ENCOUNTER — RESULT REVIEW (OUTPATIENT)
Age: 72
End: 2022-04-18

## 2022-04-18 ENCOUNTER — OUTPATIENT (OUTPATIENT)
Dept: OUTPATIENT SERVICES | Facility: HOSPITAL | Age: 72
LOS: 1 days | End: 2022-04-18
Payer: MEDICARE

## 2022-04-18 VITALS
HEIGHT: 67.5 IN | HEART RATE: 78 BPM | TEMPERATURE: 98 F | SYSTOLIC BLOOD PRESSURE: 143 MMHG | WEIGHT: 197.53 LBS | RESPIRATION RATE: 14 BRPM | OXYGEN SATURATION: 97 % | DIASTOLIC BLOOD PRESSURE: 83 MMHG

## 2022-04-18 VITALS
TEMPERATURE: 98 F | SYSTOLIC BLOOD PRESSURE: 127 MMHG | DIASTOLIC BLOOD PRESSURE: 69 MMHG | HEART RATE: 75 BPM | RESPIRATION RATE: 16 BRPM | OXYGEN SATURATION: 95 %

## 2022-04-18 DIAGNOSIS — Z98.89 OTHER SPECIFIED POSTPROCEDURAL STATES: Chronic | ICD-10-CM

## 2022-04-18 DIAGNOSIS — K13.21 LEUKOPLAKIA OF ORAL MUCOSA, INCLUDING TONGUE: ICD-10-CM

## 2022-04-18 DIAGNOSIS — Z98.890 OTHER SPECIFIED POSTPROCEDURAL STATES: Chronic | ICD-10-CM

## 2022-04-18 DIAGNOSIS — Z98.52 VASECTOMY STATUS: Chronic | ICD-10-CM

## 2022-04-18 PROCEDURE — C9399: CPT

## 2022-04-18 PROCEDURE — ZZZZZ: CPT

## 2022-04-18 PROCEDURE — 41825 EXCISION OF GUM LESION: CPT

## 2022-04-18 PROCEDURE — 88309 TISSUE EXAM BY PATHOLOGIST: CPT | Mod: 26

## 2022-04-18 PROCEDURE — 40808 BIOPSY OF MOUTH LESION: CPT | Mod: LT

## 2022-04-18 PROCEDURE — 88311 DECALCIFY TISSUE: CPT

## 2022-04-18 PROCEDURE — 88311 DECALCIFY TISSUE: CPT | Mod: 26

## 2022-04-18 PROCEDURE — 88309 TISSUE EXAM BY PATHOLOGIST: CPT

## 2022-04-18 RX ORDER — MULTIVIT-MIN/FERROUS GLUCONATE 9 MG/15 ML
1 LIQUID (ML) ORAL
Qty: 0 | Refills: 0 | DISCHARGE

## 2022-04-18 RX ORDER — CALCIUM CARBONATE 500(1250)
1 TABLET ORAL
Qty: 0 | Refills: 0 | DISCHARGE

## 2022-04-18 RX ORDER — HYDROMORPHONE HYDROCHLORIDE 2 MG/ML
1 INJECTION INTRAMUSCULAR; INTRAVENOUS; SUBCUTANEOUS
Refills: 0 | Status: DISCONTINUED | OUTPATIENT
Start: 2022-04-18 | End: 2022-04-18

## 2022-04-18 RX ORDER — ASPIRIN/CALCIUM CARB/MAGNESIUM 324 MG
1 TABLET ORAL
Qty: 0 | Refills: 0 | DISCHARGE

## 2022-04-18 RX ORDER — SODIUM CHLORIDE 9 MG/ML
1000 INJECTION, SOLUTION INTRAVENOUS
Refills: 0 | Status: DISCONTINUED | OUTPATIENT
Start: 2022-04-18 | End: 2022-04-18

## 2022-04-18 RX ORDER — GLUCOSAMINE HCL/CHONDROITIN SU 500-400 MG
1 CAPSULE ORAL
Qty: 0 | Refills: 0 | DISCHARGE

## 2022-04-18 RX ORDER — OXYCODONE HYDROCHLORIDE 5 MG/1
5 TABLET ORAL EVERY 6 HOURS
Refills: 0 | Status: DISCONTINUED | OUTPATIENT
Start: 2022-04-18 | End: 2022-04-18

## 2022-04-18 RX ORDER — GABAPENTIN 400 MG/1
1 CAPSULE ORAL
Qty: 0 | Refills: 0 | DISCHARGE

## 2022-04-18 RX ORDER — ONDANSETRON 8 MG/1
4 TABLET, FILM COATED ORAL ONCE
Refills: 0 | Status: DISCONTINUED | OUTPATIENT
Start: 2022-04-18 | End: 2022-04-18

## 2022-04-18 RX ORDER — IBUPROFEN 200 MG
2 TABLET ORAL
Qty: 0 | Refills: 0 | DISCHARGE

## 2022-04-18 RX ORDER — OXYCODONE HYDROCHLORIDE 5 MG/1
1 TABLET ORAL
Qty: 8 | Refills: 0
Start: 2022-04-18

## 2022-04-18 RX ORDER — ONDANSETRON 8 MG/1
4 TABLET, FILM COATED ORAL EVERY 6 HOURS
Refills: 0 | Status: DISCONTINUED | OUTPATIENT
Start: 2022-04-18 | End: 2022-05-02

## 2022-04-18 RX ORDER — CHOLECALCIFEROL (VITAMIN D3) 125 MCG
1 CAPSULE ORAL
Qty: 0 | Refills: 0 | DISCHARGE

## 2022-04-18 RX ORDER — ARGININE 700 MG
1 CAPSULE ORAL
Qty: 0 | Refills: 0 | DISCHARGE

## 2022-04-18 RX ORDER — HYDROMORPHONE HYDROCHLORIDE 2 MG/ML
0.5 INJECTION INTRAMUSCULAR; INTRAVENOUS; SUBCUTANEOUS
Refills: 0 | Status: DISCONTINUED | OUTPATIENT
Start: 2022-04-18 | End: 2022-04-18

## 2022-04-18 RX ADMIN — SODIUM CHLORIDE 50 MILLILITER(S): 9 INJECTION, SOLUTION INTRAVENOUS at 07:42

## 2022-04-18 NOTE — ASU PATIENT PROFILE, ADULT - NSICDXPASTSURGICALHX_GEN_ALL_CORE_FT
PAST SURGICAL HISTORY:  H/O excision of mass mouth 2017    History of microdiscectomy lumbar 7/7/2015    S/P arthroscopy of left knee 2013    S/P vasectomy 1992

## 2022-04-18 NOTE — ASU DISCHARGE PLAN (ADULT/PEDIATRIC) - NURSING INSTRUCTIONS
All discharge information reviewed with patient including pain, safety, medication and follow up care . Pt acknowledges understanding of discharge instructions.

## 2022-04-18 NOTE — ASU DISCHARGE PLAN (ADULT/PEDIATRIC) - CONDITION AT DISCHARGE
Chief Complaint   Patient presents with     Urinary Problem     2 month f/u BPH       Initial /80 (BP Location: Right arm, Cuff Size: Adult Regular)  Pulse 66  Temp 96.2  F (35.7  C) (Tympanic)  Ht 1.829 m (6')  Wt 90.7 kg (200 lb)  SpO2 93%  BMI 27.12 kg/m2 Estimated body mass index is 27.12 kg/(m^2) as calculated from the following:    Height as of this encounter: 1.829 m (6').    Weight as of this encounter: 90.7 kg (200 lb).  Medication Reconciliation: complete   April Salgado LPN       Stable

## 2022-04-18 NOTE — ASU PATIENT PROFILE, ADULT - VISION (WITH CORRECTIVE LENSES IF THE PATIENT USUALLY WEARS THEM):
Normal vision: sees adequately in most situations; can see medication labels, newsprint uses for reading and distance glasses/Normal vision: sees adequately in most situations; can see medication labels, newsprint

## 2022-04-18 NOTE — ASU PATIENT PROFILE, ADULT - DOMESTIC TRAVEL HIGH RISK QUESTION
Pre-Operative Diagnosis: Ulcerative colitis with complication, unspecified location [facility]) [K51.919]  History of colon polyps [Z86.010]  Gastroesophageal reflux disease, esophagitis presence not specified [K21.9]     Post-Operative Diagnosis: EGD=  normal / No

## 2022-04-18 NOTE — ASU DISCHARGE PLAN (ADULT/PEDIATRIC) - ASU DC SPECIAL INSTRUCTIONSFT
TAKE OXYCODONE FOR SEVERE PAIN, OTHERWISE TAKE TYLENOL    DO NOT DRIVE WHILE TAKING OXYCODONE    EAT A SOFT DIET, AVOID HOT AND ACIDIC FOODS    EAT/DRINK ROOM TEMPERATURE FOODS/LIQUIDS    GARGLE WITH SALTWATER 3 TIMES A DAY, TAKE HALF A TEASPOON OF SALT IN 8 OZ OF WATER AND GARGLE FOR 20 SECONDS     FOLLOW UP WITH DR. RDZ IN 1 WEEK, PLEASE CALL THE OFFICE FOR AN APPOINTMENT TAKE OXYCODONE FOR SEVERE PAIN, OTHERWISE TAKE TYLENOL    DO NOT DRIVE WHILE TAKING OXYCODONE    YOU MAY RESUME TAKING ASPIRIN ON WEDNESDAY 4/20/22    EAT A SOFT DIET, AVOID HOT AND ACIDIC FOODS    EAT/DRINK ROOM TEMPERATURE FOODS/LIQUIDS    GARGLE WITH SALTWATER 3 TIMES A DAY, TAKE HALF A TEASPOON OF SALT IN 8 OZ OF WATER AND GARGLE FOR 20 SECONDS     FOLLOW UP WITH DR. RDZ IN 1 WEEK, PLEASE CALL THE OFFICE FOR AN APPOINTMENT

## 2022-04-18 NOTE — ASU DISCHARGE PLAN (ADULT/PEDIATRIC) - NS MD DC FALL RISK RISK
For information on Fall & Injury Prevention, visit: [email or web address] OR  [email or web address] OR  https://www.cdc.gov/steadi/patient.html

## 2022-04-18 NOTE — ASU PATIENT PROFILE, ADULT - NSICDXPASTMEDICALHX_GEN_ALL_CORE_FT
PAST MEDICAL HISTORY:  Contact dermatitis     Eczema     H/O neuropathy     Lumbar herniated disc     Malignant neoplasm of mouth, unspecified      PAST MEDICAL HISTORY:  Contact dermatitis     Eczema     H/O neuropathy left foot    Lumbar herniated disc     Malignant neoplasm of mouth, unspecified

## 2022-04-18 NOTE — ASU DISCHARGE PLAN (ADULT/PEDIATRIC) - CARE PROVIDER_API CALL
Cliff Perez)  [facility]; Otolaryngology  [address] [place] [id number]  Phone: [phone]  Fax: [phone]  Follow Up Time:

## 2022-04-20 PROBLEM — Z86.69 PERSONAL HISTORY OF OTHER DISEASES OF THE NERVOUS SYSTEM AND SENSE ORGANS: Chronic | Status: ACTIVE | Noted: 2022-03-22

## 2022-04-20 LAB — SURGICAL PATHOLOGY STUDY: SIGNIFICANT CHANGE UP

## 2022-04-22 ENCOUNTER — NON-APPOINTMENT (OUTPATIENT)
Age: 72
End: 2022-04-22

## 2022-04-27 ENCOUNTER — APPOINTMENT (OUTPATIENT)
Dept: OTOLARYNGOLOGY | Facility: CLINIC | Age: 72
End: 2022-04-27

## 2022-05-03 ENCOUNTER — APPOINTMENT (OUTPATIENT)
Dept: OTOLARYNGOLOGY | Facility: CLINIC | Age: 72
End: 2022-05-03
Payer: MEDICARE

## 2022-05-03 PROCEDURE — 99024 POSTOP FOLLOW-UP VISIT: CPT

## 2022-05-03 NOTE — CONSULT LETTER
[Dear  ___] : Dear  [unfilled], [Courtesy Letter:] : I had the pleasure of seeing your patient, [unfilled], in my office today. [Please see my note below.] : Please see my note below. [Consult Closing:] : Thank you very much for allowing me to participate in the care of this patient.  If you have any questions, please do not hesitate to contact me. [Sincerely,] : Sincerely, [FreeTextEntry2] : Eloy Miguel MD ([place], [place]) [FreeTextEntry3] : Cliff Perez MD, FACS\par \par    [facility] [place]\par Associate Chair\par    Department of Otolaryngology\par \par Professor\par Otolaryngology & Molecular Medicine\par [employer]\par

## 2022-05-03 NOTE — REASON FOR VISIT
[Post-Operative Visit] : a post-operative visit [FreeTextEntry2] : s/p Excision of white buccal mandibular gingival lesion on 4/18/2022

## 2022-05-03 NOTE — HISTORY OF PRESENT ILLNESS
[None] : No associated symptoms are reported. [de-identified] : s/p Excision of white buccal mandibular gingival lesion on 4/18/2022. Final path showing squamous mucosa with mild dysplasia, hypergranulosis and hyperkeratosis. He presents for post op follow up. \par Denies pain, dysphagia, dysphonia and or dyspnea \par

## 2022-11-15 ENCOUNTER — APPOINTMENT (OUTPATIENT)
Dept: OTOLARYNGOLOGY | Facility: CLINIC | Age: 72
End: 2022-11-15

## 2022-11-15 VITALS
HEART RATE: 70 BPM | DIASTOLIC BLOOD PRESSURE: 82 MMHG | SYSTOLIC BLOOD PRESSURE: 151 MMHG | BODY MASS INDEX: 28.14 KG/M2 | WEIGHT: 190 LBS | HEIGHT: 69 IN

## 2022-11-15 PROCEDURE — 99213 OFFICE O/P EST LOW 20 MIN: CPT

## 2022-11-15 RX ORDER — ESCITALOPRAM OXALATE 5 MG/1
5 TABLET ORAL
Qty: 90 | Refills: 0 | Status: DISCONTINUED | COMMUNITY
Start: 2022-10-20

## 2022-11-15 RX ORDER — CALCIUM CARBONATE 600 MG
TABLET ORAL
Refills: 0 | Status: ACTIVE | COMMUNITY

## 2022-11-15 RX ORDER — CHROMIUM 200 MCG
TABLET ORAL
Refills: 0 | Status: ACTIVE | COMMUNITY

## 2022-11-15 NOTE — CONSULT LETTER
[Dear  ___] : Dear  [unfilled], [Courtesy Letter:] : I had the pleasure of seeing your patient, [unfilled], in my office today. [Please see my note below.] : Please see my note below. [Sincerely,] : Sincerely, [FreeTextEntry2] : Eloy Miguel MD ([place], [place])  [FreeTextEntry3] : Cliff Perez MD, FACS\par \par [facility] [place]\par Associate Chair\par Department of Otolaryngology\par Professor\par Otolaryngology & Molecular Medicine\par [facility] [facility] [employer]\par

## 2022-11-15 NOTE — PHYSICAL EXAM
[Midline] : trachea located in midline position [de-identified] : sca at exc bx site L kia gingiva.  Prior surg site ant gingiva kia PAULIE [Normal] : no rashes

## 2022-11-15 NOTE — HISTORY OF PRESENT ILLNESS
[de-identified] : 72 year old male s/p Excision of white buccal mandibular gingival lesion on 4/18/2022.   Denies any new lesions in the mouth, denies any bleeding.  Denies pain, dysphagia, dysphonia and or dyspnea.\par

## 2022-11-15 NOTE — REASON FOR VISIT
[Subsequent Evaluation] : a subsequent evaluation for [FreeTextEntry2] : s/p Excision of white buccal mandibular gingival lesion on 4/18/2022. \par

## 2023-01-20 NOTE — H&P PST ADULT - NSICDXPASTSURGICALHX_GEN_ALL_CORE_FT
20-Jan-2023 16:15
PAST SURGICAL HISTORY:  H/O excision of mass mouth 2017    History of microdiscectomy lumbar 7/7/2015    S/P arthroscopy of left knee 2013    S/P vasectomy 1992

## 2023-04-04 ENCOUNTER — OUTPATIENT (OUTPATIENT)
Dept: OUTPATIENT SERVICES | Facility: HOSPITAL | Age: 73
LOS: 1 days | End: 2023-04-04
Payer: MEDICARE

## 2023-04-04 ENCOUNTER — APPOINTMENT (OUTPATIENT)
Dept: ULTRASOUND IMAGING | Facility: CLINIC | Age: 73
End: 2023-04-04
Payer: MEDICARE

## 2023-04-04 DIAGNOSIS — Z00.8 ENCOUNTER FOR OTHER GENERAL EXAMINATION: ICD-10-CM

## 2023-04-04 DIAGNOSIS — Z98.890 OTHER SPECIFIED POSTPROCEDURAL STATES: Chronic | ICD-10-CM

## 2023-04-04 DIAGNOSIS — Z98.52 VASECTOMY STATUS: Chronic | ICD-10-CM

## 2023-04-04 PROCEDURE — 76700 US EXAM ABDOM COMPLETE: CPT

## 2023-04-04 PROCEDURE — 76700 US EXAM ABDOM COMPLETE: CPT | Mod: 26

## 2023-06-09 NOTE — H&P PST ADULT - SYMPTOMS
Patient discussed on morning rounds with Dr. Soriano    Operation Date: 6/8/23 Valve in Valve TAVR EF 35-40%     Primary Surgeon/Attending MD: Dr. Soriano    Referring Physician: Dr. Kleber Miller  _ _ _ _ _ _ _ _ _ _ _ _    HOSPITAL COURSE:  64 year old male with PMHx of HTN, HLD, glaucoma, VSD, CHF (EF 35-40%), CAD s/p CABG x 2 ( LIMA to LAD, reverse SVG from aorta to the diagonal 3/7/16), aortic root/ascending aorta, & transverse aortic arch replacement, AVR. Pt presented to their outpatient cardiologist complaining of BAIRES with exertion with NYHA II sxs. Pt had drop in LVEF (55% in 7/2020 --> 35-40% in 2/2022), bioprosthetic aortic stenosis, and VSD. Pt  underwent a left and right cardiac catheterization to assess gradients. On 6/8 patient underwent uncomplicated valve in valve TAVR with Dr. Soriano. Arrived to [facility] in stable condition on cardene drip. POD 1 weaned off cardene, restarted PO medications. Post procedure TTE stable. Patient deemed medically stable for discharge home with [facility].  _ _ _ _ _ _ _ _ _ _ _ _    DISCHARGE PHYSICAL EXAM:  GEN: NAD, looks comfortable  Psych: Mood appropriate  Neuro: A&Ox3.  No focal deficits.  Moving all extremities.   HEENT: No obvious abnormalities  CV: S1S2, regular, no murmurs appreciated.  No carotid bruits.  No JVD  Lungs: Clear B/L.  No wheezing, rales or rhonchi  ABD: Soft, non-tender, non-distended.  +Bowel sounds  EXT: Warm and well perfused.  No peripheral edema noted  Musculoskeletal: Moving all extremities with normal ROM, no joint swelling  PV: Pedal pulses palpable  Incision Sites: bilateral groins soft, c/d/i no hematoma present  _ _ _ _ _ _ _ _ _ _ _ _        TAVR Valve        [x] Aspirin        [x] Plavix  _ _ _ _ _ _ _ _ _ _ _ _    CLINICAL FOLLOW UP NEEDS:     [x] Home equipment           Type: OT           Outpatient team aware: YES  _ _ _ _ _ _ _ _ _ _ _ _  Over 35 minutes was spent with the patient reviewing the discharge material including medications, follow up appointments, recovery, concerning symptoms, and how to contact their health care providers if they have questions   Patient discussed on morning rounds with Dr. Soriano    Operation Date: 6/8/23 Valve in Valve TAVR EF 35-40%     Primary Surgeon/Attending MD: Dr. Soriano    Referring Physician: Dr. Kleber Miller  _ _ _ _ _ _ _ _ _ _ _ _    HOSPITAL COURSE:  64 year old male with PMHx of HTN, HLD, glaucoma, VSD, CHF (EF 35-40%), CAD s/p CABG x 2 ( LIMA to LAD, reverse SVG from aorta to the diagonal 3/7/16), aortic root/ascending aorta, & transverse aortic arch replacement, AVR. Pt presented to their outpatient cardiologist complaining of BAIRES with exertion with NYHA II sxs. Pt had drop in LVEF (55% in 7/2020 --> 35-40% in 2/2022), bioprosthetic aortic stenosis, and VSD. Pt  underwent a left and right cardiac catheterization to assess gradients. On 6/8 patient underwent uncomplicated valve in valve TAVR with Dr. Soriano. Arrived to 9 [facility] in stable condition on cardene drip. POD 1 weaned off cardene, restarted PO medications. Post procedure TTE stable. Patient deemed medically stable for discharge home with OT.    Post op TTE 6/9/23   1. Normal left ventricular cavity size.   2. Moderate symmetric left ventricular hypertrophy.   3. Moderately reduced left ventricular systolic function with global   hypokinesis, LV EF 40%   4. Grade II diastolic dysfunction with elevated filling pressure.   5. Mildly dilated right ventricular cavity.   6. Mildly reduced right ventricular systolic function.   7. Biatrial enlargement.   8. s/p Matthew TAVR with a 26mm Sergio, trace paravalvular leak, PV=3.07,   MG=17, LVOT/AV=0.38.   9. Mild pulmonic regurgitation.  10. No pericardial effusion.  _ _ _ _ _ _ _ _ _ _ _ _    DISCHARGE PHYSICAL EXAM:  GEN: NAD, looks comfortable  Psych: Mood appropriate  Neuro: A&Ox3.  No focal deficits.  Moving all extremities.   HEENT: No obvious abnormalities  CV: S1S2, regular, no murmurs appreciated.  No carotid bruits.  No JVD  Lungs: Clear B/L.  No wheezing, rales or rhonchi  ABD: Soft, non-tender, non-distended.  +Bowel sounds  EXT: Warm and well perfused.  No peripheral edema noted  Musculoskeletal: Moving all extremities with normal ROM, no joint swelling  PV: Pedal pulses palpable  Incision Sites: bilateral groins soft, c/d/i no hematoma present  _ _ _ _ _ _ _ _ _ _ _ _        TAVR Valve        [x] Aspirin        [x] Plavix  _ _ _ _ _ _ _ _ _ _ _ _    CLINICAL FOLLOW UP NEEDS:     [x] Home equipment           Type: [facility]OT           Outpatient team aware: YES  _ _ _ _ _ _ _ _ _ _ _ _  Over 35 minutes was spent with the patient reviewing the discharge material including medications, follow up appointments, recovery, concerning symptoms, and how to contact their health care providers if they have questions   none

## 2023-07-31 NOTE — H&P PST ADULT - NEGATIVE CARDIOVASCULAR SYMPTOMS
no palpitations/no paroxysmal nocturnal dyspnea/no claudication/no dyspnea on exertion/no chest pain/no peripheral edema/no orthopnea electronic

## 2023-09-22 NOTE — ASU PREOP CHECKLIST - MEDICAL/PEDIATRIC CLEARANCE ON MEDICAL RECORD
Post Op Note    Surgery: gastric sleeve surgery with hiatal hernia repair   Date: 1/27/20  Initial weight: 262  Last weight: 241  Current weight: 237      Diet:  Stopped sweets    Exercise:  Water aerobics    MVI: dialy     Vitals:    09/22/23 1057   BP: (!) 134/91   Pulse: 73   Resp: 16   Temp: 97.4 °F (36.3 °C)       Body comp:  Fat Percent:  54.6 %  Fat Mass:  129.8 lb  FFM:  107.8 lb  TBW: 78 lb  TBW %:  32.8 %  BMR: 1571 kcal    PE:  NAD  RRR  Soft/nt/nd    Labs: none    A/P: s/p sleeve    No side effects from topamax will continue   Still seeing onc for gist     Counseling for patient:    Diet: still doing weight watchers  Exercise: as much as possible  Vitamins: daily mvi    Co morbidities:     1. Morbid obesity        2. GIST (gastrointestinal stromal tumor), non-malignant        3. Essential hypertension        4. Hiatal hernia            -All above: Evaluated, monitored, and treated with diet and exercise program.         medical clearance on chart

## 2023-11-07 ENCOUNTER — NON-APPOINTMENT (OUTPATIENT)
Age: 73
End: 2023-11-07

## 2023-11-08 ENCOUNTER — APPOINTMENT (OUTPATIENT)
Dept: OTOLARYNGOLOGY | Facility: CLINIC | Age: 73
End: 2023-11-08
Payer: MEDICARE

## 2023-11-08 VITALS
SYSTOLIC BLOOD PRESSURE: 119 MMHG | WEIGHT: 197 LBS | OXYGEN SATURATION: 97 % | DIASTOLIC BLOOD PRESSURE: 76 MMHG | HEART RATE: 66 BPM | HEIGHT: 69 IN | BODY MASS INDEX: 29.18 KG/M2

## 2023-11-08 DIAGNOSIS — K13.21 LEUKOPLAKIA OF ORAL MUCOSA, INCLUDING TONGUE: ICD-10-CM

## 2023-11-08 DIAGNOSIS — C06.9 MALIGNANT NEOPLASM OF MOUTH, UNSPECIFIED: ICD-10-CM

## 2023-11-08 PROCEDURE — 99214 OFFICE O/P EST MOD 30 MIN: CPT

## 2024-02-13 ENCOUNTER — NON-APPOINTMENT (OUTPATIENT)
Age: 74
End: 2024-02-13

## 2024-03-04 ENCOUNTER — EMERGENCY (EMERGENCY)
Facility: HOSPITAL | Age: 74
LOS: 1 days | Discharge: HOME | End: 2024-03-04
Attending: EMERGENCY MEDICINE
Payer: MEDICARE

## 2024-03-04 VITALS
HEART RATE: 88 BPM | RESPIRATION RATE: 19 BRPM | HEIGHT: 69 IN | TEMPERATURE: 98 F | DIASTOLIC BLOOD PRESSURE: 82 MMHG | WEIGHT: 197.98 LBS | SYSTOLIC BLOOD PRESSURE: 186 MMHG | OXYGEN SATURATION: 97 %

## 2024-03-04 DIAGNOSIS — Z98.890 OTHER SPECIFIED POSTPROCEDURAL STATES: Chronic | ICD-10-CM

## 2024-03-04 DIAGNOSIS — Z98.52 VASECTOMY STATUS: Chronic | ICD-10-CM

## 2024-03-04 DIAGNOSIS — Z98.89 OTHER SPECIFIED POSTPROCEDURAL STATES: Chronic | ICD-10-CM

## 2024-03-04 LAB
ALBUMIN SERPL ELPH-MCNC: 4.3 G/DL — SIGNIFICANT CHANGE UP (ref 3.3–5)
ALP SERPL-CCNC: 78 U/L — SIGNIFICANT CHANGE UP (ref 40–120)
ALT FLD-CCNC: 29 U/L — SIGNIFICANT CHANGE UP (ref 10–45)
ANION GAP SERPL CALC-SCNC: 10 MMOL/L — SIGNIFICANT CHANGE UP (ref 5–17)
AST SERPL-CCNC: 21 U/L — SIGNIFICANT CHANGE UP (ref 10–40)
BASOPHILS # BLD AUTO: 0.06 K/UL — SIGNIFICANT CHANGE UP (ref 0–0.2)
BASOPHILS NFR BLD AUTO: 0.9 % — SIGNIFICANT CHANGE UP (ref 0–2)
BILIRUB SERPL-MCNC: 0.4 MG/DL — SIGNIFICANT CHANGE UP (ref 0.2–1.2)
BUN SERPL-MCNC: 14 MG/DL — SIGNIFICANT CHANGE UP (ref 7–23)
CALCIUM SERPL-MCNC: 9.3 MG/DL — SIGNIFICANT CHANGE UP (ref 8.4–10.5)
CHLORIDE SERPL-SCNC: 106 MMOL/L — SIGNIFICANT CHANGE UP (ref 96–108)
CO2 SERPL-SCNC: 27 MMOL/L — SIGNIFICANT CHANGE UP (ref 22–31)
CREAT SERPL-MCNC: 0.82 MG/DL — SIGNIFICANT CHANGE UP (ref 0.5–1.3)
EGFR: 92 ML/MIN/1.73M2 — SIGNIFICANT CHANGE UP
EOSINOPHIL # BLD AUTO: 0.06 K/UL — SIGNIFICANT CHANGE UP (ref 0–0.5)
EOSINOPHIL NFR BLD AUTO: 0.9 % — SIGNIFICANT CHANGE UP (ref 0–6)
GLUCOSE SERPL-MCNC: 100 MG/DL — HIGH (ref 70–99)
HCT VFR BLD CALC: 43.6 % — SIGNIFICANT CHANGE UP (ref 39–50)
HGB BLD-MCNC: 13.9 G/DL — SIGNIFICANT CHANGE UP (ref 13–17)
IMM GRANULOCYTES NFR BLD AUTO: 0.3 % — SIGNIFICANT CHANGE UP (ref 0–0.9)
LYMPHOCYTES # BLD AUTO: 1.4 K/UL — SIGNIFICANT CHANGE UP (ref 1–3.3)
LYMPHOCYTES # BLD AUTO: 21.8 % — SIGNIFICANT CHANGE UP (ref 13–44)
MAGNESIUM SERPL-MCNC: 2.1 MG/DL — SIGNIFICANT CHANGE UP (ref 1.6–2.6)
MCHC RBC-ENTMCNC: 29.2 PG — SIGNIFICANT CHANGE UP (ref 27–34)
MCHC RBC-ENTMCNC: 31.9 GM/DL — LOW (ref 32–36)
MCV RBC AUTO: 91.6 FL — SIGNIFICANT CHANGE UP (ref 80–100)
MONOCYTES # BLD AUTO: 0.44 K/UL — SIGNIFICANT CHANGE UP (ref 0–0.9)
MONOCYTES NFR BLD AUTO: 6.9 % — SIGNIFICANT CHANGE UP (ref 2–14)
NEUTROPHILS # BLD AUTO: 4.43 K/UL — SIGNIFICANT CHANGE UP (ref 1.8–7.4)
NEUTROPHILS NFR BLD AUTO: 69.2 % — SIGNIFICANT CHANGE UP (ref 43–77)
NRBC # BLD: 0 /100 WBCS — SIGNIFICANT CHANGE UP (ref 0–0)
NT-PROBNP SERPL-SCNC: 41 PG/ML — SIGNIFICANT CHANGE UP (ref 0–300)
PLATELET # BLD AUTO: 203 K/UL — SIGNIFICANT CHANGE UP (ref 150–400)
POTASSIUM SERPL-MCNC: 4 MMOL/L — SIGNIFICANT CHANGE UP (ref 3.5–5.3)
POTASSIUM SERPL-SCNC: 4 MMOL/L — SIGNIFICANT CHANGE UP (ref 3.5–5.3)
PROT SERPL-MCNC: 7.5 G/DL — SIGNIFICANT CHANGE UP (ref 6–8.3)
RBC # BLD: 4.76 M/UL — SIGNIFICANT CHANGE UP (ref 4.2–5.8)
RBC # FLD: 14.2 % — SIGNIFICANT CHANGE UP (ref 10.3–14.5)
SODIUM SERPL-SCNC: 143 MMOL/L — SIGNIFICANT CHANGE UP (ref 135–145)
TROPONIN T, HIGH SENSITIVITY RESULT: 10 NG/L — SIGNIFICANT CHANGE UP (ref 0–51)
TROPONIN T, HIGH SENSITIVITY RESULT: 9 NG/L — SIGNIFICANT CHANGE UP (ref 0–51)
WBC # BLD: 6.41 K/UL — SIGNIFICANT CHANGE UP (ref 3.8–10.5)
WBC # FLD AUTO: 6.41 K/UL — SIGNIFICANT CHANGE UP (ref 3.8–10.5)

## 2024-03-04 PROCEDURE — 99223 1ST HOSP IP/OBS HIGH 75: CPT | Mod: FS

## 2024-03-04 PROCEDURE — 71045 X-RAY EXAM CHEST 1 VIEW: CPT | Mod: 26

## 2024-03-04 RX ORDER — FAMOTIDINE 10 MG/ML
20 INJECTION INTRAVENOUS ONCE
Refills: 0 | Status: COMPLETED | OUTPATIENT
Start: 2024-03-04 | End: 2024-03-04

## 2024-03-04 RX ORDER — GABAPENTIN 400 MG/1
300 CAPSULE ORAL AT BEDTIME
Refills: 0 | Status: DISCONTINUED | OUTPATIENT
Start: 2024-03-04 | End: 2024-03-08

## 2024-03-04 RX ADMIN — FAMOTIDINE 20 MILLIGRAM(S): 10 INJECTION INTRAVENOUS at 13:32

## 2024-03-04 RX ADMIN — Medication 30 MILLILITER(S): at 13:32

## 2024-03-04 RX ADMIN — GABAPENTIN 300 MILLIGRAM(S): 400 CAPSULE ORAL at 22:46

## 2024-03-04 NOTE — ED ADULT NURSE NOTE - PATIENT'S PREFERRED PRONOUN
Him/He

## 2024-03-04 NOTE — ED PROVIDER NOTE - CLINICAL SUMMARY MEDICAL DECISION MAKING FREE TEXT BOX
Alessandro Johnson, PGY2 - This is a 73 year old male with pmh of L foot neuropathy, and eczema presenting today with substernal chest heaviness for 3 days. Reports this happened 3 days ago. Reports no pleurisy, exertioal component of this. Never saw cardiologist. never had provocative testing. No shortness of breath, sweaing. no nausea/vomiting. no abdominal pain. no smoking. no family history of cardiac disease. Vtials wnl. EKG non ischemic. Physical exam not remarkable. No epigastric ttp. No crackles/wheezing. No murmurs. No lower extremity swelling/pain. Will obtain cbc, cmp, lipase, troponin, bnp and mag/phos. Will put on cardiac monitoring and obtain chest x ray. Low risk chest pain but may consider stress testing in observational unit. Dispo pending labs, imaging and reassessment. Will give GI cocktail at this time. Alessandro Johnson, PGY2 - This is a 73 year old male with pmh of L foot neuropathy, and eczema presenting today with substernal chest heaviness for 3 days. Reports this happened 3 days ago. Reports no pleurisy, exertioal component of this. Never saw cardiologist. never had provocative testing. No shortness of breath, sweaing. no nausea/vomiting. no abdominal pain. no smoking. no family history of cardiac disease. Vtials wnl. EKG non ischemic. Physical exam not remarkable. No epigastric ttp. No crackles/wheezing. No murmurs. No lower extremity swelling/pain. Will obtain cbc, cmp, lipase, troponin, bnp and mag/phos. Will put on cardiac monitoring and obtain chest x ray. Low risk chest pain but may consider stress testing in observational unit. Dispo pending labs, imaging and reassessment. Will give GI cocktail at this time.    see attending attestation authored by me, Jarett Chavez MD, for further MDM details.

## 2024-03-04 NOTE — ED CDU PROVIDER DISPOSITION NOTE - ATTENDING APP SHARED VISIT CONTRIBUTION OF CARE
pt had cp at stress -> sent back to cdu for repeat ekg/trop - pain is same as for last 24 hrs or so  rrr, nad  will repeat ekg / trop / do echo/stress

## 2024-03-04 NOTE — ED ADULT NURSE REASSESSMENT NOTE - NSFALLUNIVINTERV_ED_ALL_ED
Bed/Stretcher in lowest position, wheels locked, appropriate side rails in place/Call bell, personal items and telephone in reach/Instruct patient to call for assistance before getting out of bed/chair/stretcher/Non-slip footwear applied when patient is off stretcher/[place] to call system/Physically safe environment - no spills, clutter or unnecessary equipment/Purposeful proactive rounding/Room/bathroom lighting operational, light cord in reach

## 2024-03-04 NOTE — ED CDU PROVIDER INITIAL DAY NOTE - OBJECTIVE STATEMENT
73 year old male with pmh of L foot neuropathy, and eczema presenting today with substernal chest heaviness for 3 days. Reports this happened 3 days ago. Reports no pleurisy, exertioal component of this. Never saw cardiologist. never had provocative testing. No shortness of breath, sweaing. no nausea/vomiting. no abdominal pain. no smoking. no family history of cardiac disease. Vtials wnl. EKG non ischemic. Physical exam not remarkable. No epigastric ttp. No crackles/wheezing. No murmurs. No lower extremity swelling/pain.  ED course reviewed. VSS in ED. trop 9. pending second trop. sent to cdu for TTE, Stress test, tele monitoring. 73 year old male with pmh of L foot neuropathy, and eczema presenting today with substernal chest heaviness for 3 days. Reports this happened 3 days ago. Reports no pleurisy, exertional component of this. Never saw cardiologist. never had provocative testing. No shortness of breath, sweating. no nausea/vomiting. no abdominal pain. no smoking. no family history of cardiac disease. denies lower extremity swelling/pain.  ED course reviewed. VSS in ED. EKG non ischemic. trop 9. pending second trop. sent to cdu for TTE, Stress test, tele monitoring.

## 2024-03-04 NOTE — ED CDU PROVIDER INITIAL DAY NOTE - PROGRESS NOTE DETAILS
CDU PROGRESS NOTE JOSE COULTER: Received pt at 1900 sign-out. Pt resting in stretcher in NAD. Case/plan reviewed. VSS. Pt ate dinner. Ambulatory around unit with steady gait. S1 S2 noted, RRR, lungs CTA b/l, BS x4 with soft, nontender abdomen. Pt without complaints. Will continue to monitor overnight.

## 2024-03-04 NOTE — ED CDU PROVIDER DISPOSITION NOTE - NSFOLLOWUPINSTRUCTIONS_ED_ALL_ED_FT
1. It is important to follow up with your primary care doctor in 1-2 days    follow up with cardiology in 1-2 days    2. bring a copy of all your results to your follow up appointments    3. you can take Tylenol as needed for pain. you can take 650mg of Tylenol every 6 hours as needed for pain. do not take more than 4000mg in a 24 hour period.     4. if your symptoms worsen, persist, or if any new symptoms develop, or if you experience any signs of distress, return to the ER right away.    Chest Pain    Chest pain can be caused by many different conditions which may or may not be dangerous. Causes include heartburn, lung infections, heart attack, blood clot in lungs, skin infections, strain or damage to muscle, cartilage, or bones, etc. In addition to a history and physical examination, an electrocardiogram (ECG) or other lab tests may have been performed to determine the cause of your chest pain. Follow up with your primary care provider or with a cardiologist as instructed.     SEEK IMMEDIATE MEDICAL CARE IF YOU HAVE ANY OF THE FOLLOWING SYMPTOMS: worsening chest pain, coughing up blood, unexplained back/neck/jaw pain, severe abdominal pain, dizziness or lightheadedness, fainting, shortness of breath, sweaty or clammy skin, vomiting, or racing heart beat. These symptoms may represent a serious problem that is an emergency. Do not wait to see if the symptoms will go away. Get medical help right away. Call 911 and do not drive yourself to the hospital. Please make sure to follow up with your primary care doctor within 1-2 days and with the CARDIOLOGY specialist. The information for follow up can be found below. Bring a copy of all of your results with you to your follow up appointments.   Return to the ER as discussed if you develop any new or worsening symptoms.      Cardiology at [address] [address]  Phone: [phone]

## 2024-03-04 NOTE — ED CDU PROVIDER INITIAL DAY NOTE - DETAILS
chest pain:  -tele monitoring  -TTE  -stress test  -frequent re-evaluations  -discussed with ED attending

## 2024-03-04 NOTE — ED PROVIDER NOTE - ATTENDING CONTRIBUTION TO CARE
74 yo male p/w CP.  conversant on exam, no acute distress.  EKG independently reviewed by me at the bedside, no evidence of STEMI, no tachydysrhythmia.  chest x-ray independently reviewed by myself, no evidence of pneumothorax, no PNA.  family @ bedside for collateral.     CBC, CMP, cardiac enzymes sent.  plan to admit to CDU for telemetry, trend enzymes and further management.

## 2024-03-04 NOTE — ED CDU PROVIDER DISPOSITION NOTE - NSFOLLOWUPCLINICS_GEN_ALL_ED_FT
Cardiology at [facility]  Cardiology  [address] [place] [address]  Phone: [phone]  Fax:   Follow Up Time: 7-10 Days

## 2024-03-04 NOTE — ED PROVIDER NOTE - NSICDXPASTMEDICALHX_GEN_ALL_CORE_FT
PAST MEDICAL HISTORY:  Contact dermatitis     Eczema     H/O neuropathy left foot    Lumbar herniated disc     Malignant neoplasm of mouth, unspecified

## 2024-03-04 NOTE — ED PROVIDER NOTE - PHYSICAL EXAMINATION
Final Anesthesia Post-op Assessment    Patient: Leda Warren  Procedure(s) Performed: RIGHT THYROID LOBECTOMY - RIGHT  Anesthesia type: General    Vitals Value Taken Time   Temp 36.5 06/16/22 1037   Pulse 81 06/16/22 1030   Resp 16 06/16/22 1030   SpO2 96 % 06/16/22 1030   /79 06/16/22 1030         Patient Location: PACU Phase 1  Post-op Vital Signs:stable  Level of Consciousness: participates in exam and awake  Respiratory Status: spontaneous ventilation  Cardiovascular blood pressure returned to baseline  Hydration: euvolemic  Pain Management: adequately controlled  Handoff: Handoff to receiving clinician was performed and questions were answered  Vomiting: none  Nausea: None  Airway Patency:patent  Post-op Assessment: awake, alert, appropriately conversant, or baseline, no complications, patient tolerated procedure well with no complications, no evidence of recall, dentition within defined limits, moving all extremities and No Corneal Abrasion      There were no known complications for this encounter.    GEN: calm, cooperative  EYES: EOM intact  ENT: mucous membranes moist  HEAD: NCAT  CV: regular  RESP: no respiratory distress  ABD: no abdominal distension  MSK: moves all extremities  NEURO: awake, alert, oriented  PSYCH: affect normal  SKIN: warm

## 2024-03-04 NOTE — ED CDU PROVIDER DISPOSITION NOTE - PATIENT PORTAL LINK FT
You can access the FollowMyHealth Patient Portal offered by [facility] by registering at the following website: [email or web address]. By joining [facility]’s FollowMyHealth portal, you will also be able to view your health information using other applications (apps) compatible with our system.

## 2024-03-04 NOTE — ED ADULT NURSE NOTE - OBJECTIVE STATEMENT
73 yr old male came in with a dull pressure in chest since saturday. no cardia history, has neuropathy and back surg. on assessment a and o x 3 lungs clear abd soft non tender no swelling in extremities no n/v/d no fevers no other complaints or issues, vitals and ekg stable.

## 2024-03-04 NOTE — ED CDU PROVIDER DISPOSITION NOTE - CLINICAL COURSE
73 year old male with pmh of L foot neuropathy, and eczema presenting today with substernal chest heaviness for 3 days. Reports this happened 3 days ago. Reports no pleurisy, exertional component of this. Never saw cardiologist. never had provocative testing. No shortness of breath, sweating. no nausea/vomiting. no abdominal pain. no smoking. no family history of cardiac disease. denies lower extremity swelling/pain.  ED course reviewed. VSS in ED. EKG non ischemic. trop 9. pending second trop. sent to cdu for TTE, Stress test, tele monitoring. 73 year old male with pmh of L foot neuropathy, and eczema presenting today with substernal chest heaviness for 3 days. Reports this happened 3 days ago. Reports no pleurisy, exertional component of this. Never saw cardiologist. never had provocative testing. No shortness of breath, sweating. no nausea/vomiting. no abdominal pain. no smoking. no family history of cardiac disease. denies lower extremity swelling/pain.  ED course reviewed. VSS in ED. EKG non ischemic. trop 9. pending second trop. sent to cdu for TTE, Stress test, tele monitoring.  In the CDU, pt with stable VS. Pt w/o chest pain. States he feels well. No reported events on tele. ECHO/stress WNL. To f/u with Cards, referral placed and clinic info given.

## 2024-03-04 NOTE — ED PROVIDER NOTE - RAPID ASSESSMENT
73-year-old male with PMH left foot neuropathy, eczema presents with wife to the ED complaining of chest pain midsternal constant since yesterday.  Chest pain improved slightly after taking four 81 mg chewable aspirin today prior to arrival.  Patient reports a sense of fatigue reported and feels like he could fall asleep, denies lightheadedness, dizziness, focal weakness, syncope, shortness of breath.  Patient denies exertional component or pleuritic component to chest pain.  Patient denies trauma or rashes.  Patient reports having chest pain many years ago which was reportedly musculoskeletal in nature, denies history of CAD.  Patient denies family history of cardiac disease < 60 years old. Nonsmoker, occasional social etoh use.    Patient was seen as a rapid assessment (QPA) patient. The patient will be seen and further worked up in the main emergency department and their care will be completed by the main emergency department team along with a thorough physical exam. Receiving team will follow up on labs, analgesia, any clinical imaging, reassess and disposition as clinically indicated, all decisions regarding the progression of care will be made at their discretion. - David Malcolm PA-C

## 2024-03-05 ENCOUNTER — RESULT REVIEW (OUTPATIENT)
Age: 74
End: 2024-03-05

## 2024-03-05 VITALS
DIASTOLIC BLOOD PRESSURE: 85 MMHG | HEART RATE: 78 BPM | SYSTOLIC BLOOD PRESSURE: 145 MMHG | RESPIRATION RATE: 17 BRPM | OXYGEN SATURATION: 97 % | TEMPERATURE: 98 F

## 2024-03-05 LAB — TROPONIN T, HIGH SENSITIVITY RESULT: 10 NG/L — SIGNIFICANT CHANGE UP (ref 0–51)

## 2024-03-05 PROCEDURE — 80053 COMPREHEN METABOLIC PANEL: CPT

## 2024-03-05 PROCEDURE — A9500: CPT

## 2024-03-05 PROCEDURE — 83690 ASSAY OF LIPASE: CPT

## 2024-03-05 PROCEDURE — 93016 CV STRESS TEST SUPVJ ONLY: CPT | Mod: MC

## 2024-03-05 PROCEDURE — 78452 HT MUSCLE IMAGE SPECT MULT: CPT | Mod: MC

## 2024-03-05 PROCEDURE — 99285 EMERGENCY DEPT VISIT HI MDM: CPT | Mod: 25

## 2024-03-05 PROCEDURE — 93356 MYOCRD STRAIN IMG SPCKL TRCK: CPT

## 2024-03-05 PROCEDURE — 99238 HOSP IP/OBS DSCHRG MGMT 30/<: CPT

## 2024-03-05 PROCEDURE — 83880 ASSAY OF NATRIURETIC PEPTIDE: CPT

## 2024-03-05 PROCEDURE — 93005 ELECTROCARDIOGRAM TRACING: CPT | Mod: XU

## 2024-03-05 PROCEDURE — 83735 ASSAY OF MAGNESIUM: CPT

## 2024-03-05 PROCEDURE — 84484 ASSAY OF TROPONIN QUANT: CPT

## 2024-03-05 PROCEDURE — 93017 CV STRESS TEST TRACING ONLY: CPT

## 2024-03-05 PROCEDURE — 78452 HT MUSCLE IMAGE SPECT MULT: CPT | Mod: 26,MC

## 2024-03-05 PROCEDURE — 71045 X-RAY EXAM CHEST 1 VIEW: CPT

## 2024-03-05 PROCEDURE — 36415 COLL VENOUS BLD VENIPUNCTURE: CPT

## 2024-03-05 PROCEDURE — 85025 COMPLETE CBC W/AUTO DIFF WBC: CPT

## 2024-03-05 PROCEDURE — 93018 CV STRESS TEST I&R ONLY: CPT | Mod: MC

## 2024-03-05 PROCEDURE — 96374 THER/PROPH/DIAG INJ IV PUSH: CPT | Mod: XU

## 2024-03-05 PROCEDURE — 93306 TTE W/DOPPLER COMPLETE: CPT | Mod: 26

## 2024-03-05 PROCEDURE — 93306 TTE W/DOPPLER COMPLETE: CPT

## 2024-03-05 PROCEDURE — G0378: CPT

## 2024-03-05 NOTE — ED CDU PROVIDER SUBSEQUENT DAY NOTE - HISTORY
CDU PROGRESS NOTE PA MARYLIN: Pt resting comfortably, feeling well without complaint. NAD, VSS. No events on telemetry.

## 2024-03-05 NOTE — ED ADULT NURSE REASSESSMENT NOTE - NS ED NURSE REASSESS COMMENT FT1
Pt received from PEMA Acevedo. Pt oriented to CDU & plan of care was discussed. Pt A&O x 4. Pt in CDU for tele, Echo, stress. Pt denies any chest pain, SOB, dizziness or palpitations as of now. Pt on a cardiac monitor in NSR, HR in 70's. V/S stable, pt afebrile,  IV in place, patent and free of signs of infiltration. Pt resting in bed. Safety & comfort measures maintained. Call bell in reach. Will continue to monitor
Pt received from PEMA Tate. Pt oriented to CDU & plan of care was discussed. Pt A&O x 4. Pt in CDU for tele, Echo, stress. Pt denies any chest pain, SOB, dizziness or palpitations as of now. Pt on a cardiac monitor in NSR, HR in 70's. V/S stable, pt afebrile,  IV in place, patent and free of signs of infiltration. Pt resting in bed. Safety & comfort measures maintained. Call bell in reach. Will continue to monitor.
Pt received from PEMA Terry at 19:00hrs. Pt A&O x 4. Pt in CDU for Telemetry, echo and stress test. Patient on cardiac monitor, NSR, HR in 70's. Pt denies any chest pain, SOB, dizziness or palpitations at this time. V/S stable, IV 18G,Rt AC, patent and free of signs of infiltration. Pt OOB independently. Safety & comfort measures maintained. Educated patient to call for assistance as needed. Call bell in reach. Will continue to monitor.

## 2024-03-05 NOTE — ED CDU PROVIDER SUBSEQUENT DAY NOTE - PROGRESS NOTE DETAILS
Pt received at sign out, resting comfortably. Returned from ECHO. Pending stress test. Called from stress lab, they are requesting delta trop and repeat EKG prior to testing. Maren Wilson PA-C Pt w/o chest pain. States he feels well. No reported events on tele. ECHO/stress WNL. To f/u with Cards, referral placed and clinic info given. Copy of results provided. Patient stable for discharge.  Follow up instructions given, return to ED precautions reviewed. Importance of follow up emphasized, patient verbalized understanding.  All questions answered. Case dw Dr. Hoyt. Maern Wilson PA-C

## 2024-08-21 ENCOUNTER — APPOINTMENT (OUTPATIENT)
Dept: OTOLARYNGOLOGY | Facility: CLINIC | Age: 74
End: 2024-08-21
Payer: MEDICARE

## 2024-08-21 VITALS
OXYGEN SATURATION: 96 % | SYSTOLIC BLOOD PRESSURE: 153 MMHG | HEART RATE: 65 BPM | WEIGHT: 198 LBS | BODY MASS INDEX: 29.33 KG/M2 | HEIGHT: 69 IN | DIASTOLIC BLOOD PRESSURE: 89 MMHG

## 2024-08-21 DIAGNOSIS — K13.70 UNSPECIFIED LESIONS OF ORAL MUCOSA: ICD-10-CM

## 2024-08-21 PROCEDURE — 99214 OFFICE O/P EST MOD 30 MIN: CPT

## 2024-08-21 NOTE — REASON FOR VISIT
[Subsequent Evaluation] : a subsequent evaluation for [Spouse] : spouse [FreeTextEntry2] : s/p Excision of white buccal mandibular gingival lesion on 4/18/2022.

## 2024-08-21 NOTE — PHYSICAL EXAM
[FreeTextEntry1] : in anterolat kia GL suicus there is friable tissue w no discrete mass w ? scar vs tissue irregularity just medial to this [Normal] : no rashes

## 2024-08-21 NOTE — CONSULT LETTER
[Dear  ___] : Dear  [unfilled], [Courtesy Letter:] : I had the pleasure of seeing your patient, [unfilled], in my office today. [Please see my note below.] : Please see my note below. [Sincerely,] : Sincerely, [FreeTextEntry2] : Megan Mayen, DMD ([place], [place]) [FreeTextEntry3] : Cliff Perez MD, FACS     [facility] Associate Chair    Department of Otolaryngology  Professor Otolaryngology & Molecular Medicine [facility]

## 2024-08-21 NOTE — HISTORY OF PRESENT ILLNESS
[de-identified] : 73 year old male presents for follow up.  Pt stated he was at the dentist last week and saw an area of his left lower mucosa that should be evaluated. As per pt it is in the same area that he had previous biopsy on in April 2022, which showed pathology consistent with mild dysplasia.  no pain.  no bleeding is hx scca T1 and kia GL sulcus w STSG repair.  s/p Excision of white buccal mandibular gingival lesion on 4/18/2022.   Patient denies throat pain, globus sensation, dysphagia, odynophagia, dyspnea, dysphonia, hemoptysis or otalgia. Denies recent fevers/infections, chills, night sweats, weight loss.

## 2024-08-29 ENCOUNTER — OUTPATIENT (OUTPATIENT)
Dept: OUTPATIENT SERVICES | Facility: HOSPITAL | Age: 74
LOS: 1 days | End: 2024-08-29

## 2024-08-29 VITALS
HEART RATE: 66 BPM | SYSTOLIC BLOOD PRESSURE: 142 MMHG | TEMPERATURE: 98 F | HEIGHT: 68 IN | RESPIRATION RATE: 16 BRPM | DIASTOLIC BLOOD PRESSURE: 85 MMHG | OXYGEN SATURATION: 98 % | WEIGHT: 197.98 LBS

## 2024-08-29 DIAGNOSIS — Z98.890 OTHER SPECIFIED POSTPROCEDURAL STATES: Chronic | ICD-10-CM

## 2024-08-29 DIAGNOSIS — K13.70 UNSPECIFIED LESIONS OF ORAL MUCOSA: ICD-10-CM

## 2024-08-29 DIAGNOSIS — Z98.89 OTHER SPECIFIED POSTPROCEDURAL STATES: Chronic | ICD-10-CM

## 2024-08-29 DIAGNOSIS — Z98.52 VASECTOMY STATUS: Chronic | ICD-10-CM

## 2024-08-29 DIAGNOSIS — G47.33 OBSTRUCTIVE SLEEP APNEA (ADULT) (PEDIATRIC): ICD-10-CM

## 2024-08-29 RX ORDER — IBUPROFEN 600 MG
2 TABLET ORAL
Refills: 0 | DISCHARGE

## 2024-08-29 NOTE — H&P PST ADULT - LAST ECHOCARDIOGRAM
3/5/24,EF 64%, normal left ventricular diastolic functions, mildly enlarged right ventricular cavity size, Left and right atrium normal, Patient had echo and stress and patient was seen in March 2024 for chest pain in [facility] and  echo and stress was normal and was told that its anxiety.

## 2024-08-29 NOTE — H&P PST ADULT - NSICDXPASTMEDICALHX_GEN_ALL_CORE_FT
PAST MEDICAL HISTORY:  Contact dermatitis     Eczema     H/O neuropathy left foot    Lumbar herniated disc     Malignant neoplasm of mouth, unspecified     Unspecified lesions of oral mucosa

## 2024-08-29 NOTE — H&P PST ADULT - PROBLEM SELECTOR PLAN 3
Pre-op Delirium Screening Questionnaire:    Patient eligible for rogelio risk screen age>75?  (if <= 74 then done)    Health care proxy paperwork given to patient? Yes (all patients should be given the packet to fill out at home and return on day of surgery to pre-op RN)    Impaired mobility (ie: uses cane, walker, wheelchair, or assist device)? Yes/no    Known dementia diagnosis? Yes/no    Impaired functional status (METS<4)? Yes/no    Malnutrition BMI<20? Yes/no Pre-op Delirium Screening Questionnaire:    Patient eligible for rogelio risk screen age>75?  (if <= 74 then done) NO    Health care proxy paperwork given to patient? Yes (all patients should be given the packet to fill out at home and return on day of surgery to pre-op RN)    Impaired mobility (ie: uses cane, walker, wheelchair, or assist device)? Yes/no    Known dementia diagnosis? Yes/no    Impaired functional status (METS<4)? Yes/no    Malnutrition BMI<20? Yes/no

## 2024-08-29 NOTE — H&P PST ADULT - PROBLEM SELECTOR PLAN 1
Scheduled for Biopsy of vestibule of mouth on 9/6/24.  Pre-op instructions provided. Pt given verbal and written instructions  and pepcid. Pt verbalized understanding. Scheduled for Biopsy of vestibule of mouth on 9/6/24.  Pre-op instructions provided. Pt given verbal and written instructions  and pepcid. Pt verbalized understanding.  Labs done by PCP and was WNL    Patient was seen at [facility] on March 4, 2024 with c/o chest pain. Patient was admitted and evaluated. Echo and stress was normal. No further testing was recommended

## 2024-08-29 NOTE — H&P PST ADULT - HISTORY OF PRESENT ILLNESS
73 yr old male with peripheral neuropathy, h/o microdiscectomy with preop dx of lesions of oral mucosa presents to have PST eval for biopsy of vestibule of mouth.    Patient was seen for a subsequent evaluation for s/p Excision of white buccal mandibular gingival lesion on 4/18/2022.    Pt stated he was at the dentist last week and saw an area of his left lower mucosa that should be evaluated. As per pt it is in the same area that he had previous biopsy on in April 2022, which showed pathology consistent with mild dysplasia. 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  s/p Excision of white buccal mandibular gingival lesion on 4/18/2022.

## 2024-08-29 NOTE — H&P PST ADULT - ENMT COMMENTS
denies loose teeth or dentures, was referred by dentist to Dr Perez for evaluating his left lower mucosal lesion Preop dx Unspecified lesions of oral mucosa, No loose teeth, Mallampati II Preop dx Unspecified lesions of oral mucosa, No loose teeth, Mallampati Unable to visualize

## 2024-09-05 ENCOUNTER — TRANSCRIPTION ENCOUNTER (OUTPATIENT)
Age: 74
End: 2024-09-05

## 2024-09-05 NOTE — ASU PATIENT PROFILE, ADULT - FALL HARM RISK - UNIVERSAL INTERVENTIONS
Bed in lowest position, wheels locked, appropriate side rails in place/Call bell, personal items and telephone in reach/Instruct patient to call for assistance before getting out of bed or chair/Non-slip footwear when patient is out of bed/Needles to call system/Physically safe environment - no spills, clutter or unnecessary equipment/Purposeful Proactive Rounding/Room/bathroom lighting operational, light cord in reach

## 2024-09-06 ENCOUNTER — APPOINTMENT (OUTPATIENT)
Dept: OTOLARYNGOLOGY | Facility: HOSPITAL | Age: 74
End: 2024-09-06

## 2024-09-06 ENCOUNTER — TRANSCRIPTION ENCOUNTER (OUTPATIENT)
Age: 74
End: 2024-09-06

## 2024-09-06 ENCOUNTER — RESULT REVIEW (OUTPATIENT)
Age: 74
End: 2024-09-06

## 2024-09-06 ENCOUNTER — OUTPATIENT (OUTPATIENT)
Dept: OUTPATIENT SERVICES | Facility: HOSPITAL | Age: 74
LOS: 1 days | Discharge: HOME | End: 2024-09-06
Payer: MEDICARE

## 2024-09-06 VITALS
SYSTOLIC BLOOD PRESSURE: 153 MMHG | WEIGHT: 197.98 LBS | OXYGEN SATURATION: 96 % | TEMPERATURE: 98 F | RESPIRATION RATE: 16 BRPM | DIASTOLIC BLOOD PRESSURE: 75 MMHG | HEART RATE: 82 BPM | HEIGHT: 68 IN

## 2024-09-06 VITALS — TEMPERATURE: 98 F

## 2024-09-06 DIAGNOSIS — Z98.890 OTHER SPECIFIED POSTPROCEDURAL STATES: Chronic | ICD-10-CM

## 2024-09-06 DIAGNOSIS — Z98.89 OTHER SPECIFIED POSTPROCEDURAL STATES: Chronic | ICD-10-CM

## 2024-09-06 DIAGNOSIS — K13.70 UNSPECIFIED LESIONS OF ORAL MUCOSA: ICD-10-CM

## 2024-09-06 DIAGNOSIS — Z98.52 VASECTOMY STATUS: Chronic | ICD-10-CM

## 2024-09-06 PROCEDURE — 40808 BIOPSY OF MOUTH LESION: CPT | Mod: GC,LT

## 2024-09-06 PROCEDURE — 88305 TISSUE EXAM BY PATHOLOGIST: CPT | Mod: 26

## 2024-09-06 RX ORDER — GABAPENTIN 100 MG
1 CAPSULE ORAL
Refills: 0 | DISCHARGE

## 2024-09-06 RX ORDER — CALCIUM CARBONATE 500(1250)
0 TABLET ORAL
Refills: 0 | DISCHARGE

## 2024-09-06 RX ORDER — OXYCODONE HYDROCHLORIDE 5 MG/1
1 TABLET ORAL
Qty: 8 | Refills: 0
Start: 2024-09-06 | End: 2024-09-07

## 2024-09-06 NOTE — ASU DISCHARGE PLAN (ADULT/PEDIATRIC) - ASU DC SPECIAL INSTRUCTIONSFT
Please eat a soft diet for the next few days.  Please take tylenol and motrin for pain, you may take oxycodone for severe pain.  Please call to schedule follow-up.

## 2024-09-06 NOTE — ASU DISCHARGE PLAN (ADULT/PEDIATRIC) - CARE PROVIDER_API CALL
Cliff Perez  Head/Neck Surgery  [address] [place] [address]  Phone: [phone]  Fax: [phone]  Follow Up Time:

## 2024-09-06 NOTE — ASU PREOP CHECKLIST - TYPE OF SOLUTION
INFORMED CONSENT: Shu Mendoza   is known to this provider and identity was confirmed via NAME and .  The patient has been informed of the risks and benefits associated with engaging in psychotherapy, the handling of protected health information, the rights of privacy and the limits of confidentiality. The patient has also been informed of the importance of reporting any suicidal or homicidal ideation to this or any provider to ensure safety of all parties, and the Shu Mendoza expressed understanding. The patient was agreeable to these terms and freely participates in individual psychotherapy.    PSYCHO-ONCOLOGY NOTE/ Individual Psychotherapy     Date: 2022   [facility]        Therapeutic Intervention: Met with patient.  Outpatient - Behavior modifying psychotherapy 60 min - CPT code 40734      Patient was last seen by me on 3/22/2022    Problem list  Patient Active Problem List   Diagnosis    OAB (overactive bladder)    Microhematuria    Frequency of urination    Essential hypertension    Type 2 diabetes mellitus without complication, without long-term current use of insulin    Glaucoma    Lymphedema    Antineoplastic chemotherapy induced anemia    Hypokalemia    Chemotherapy-induced neuropathy    Personal history of malignant neoplasm of breast    Aromatase inhibitor use    Decreased functional mobility and endurance    Adjustment disorder with mixed anxiety and depressed mood       Chief complaint/reason for encounter: grief   Met with patient to evaluate psychosocial adaptation to diagnosis/treatment/survivorship of BC    Current Medications  Current Outpatient Medications   Medication    amitriptyline (ELAVIL) 10 MG tablet    amlodipine-benazepril (LOTREL) 5-40 mg per capsule    anastrozole (ARIMIDEX) 1 mg Tab    atorvastatin (LIPITOR) 10 MG tablet    carvediloL (COREG) 6.25 MG tablet    dorzolamide (TRUSOPT) 2 % ophthalmic solution    empagliflozin (JARDIANCE) 10 mg  Tab    fluticasone propionate (FLONASE) 50 mcg/actuation nasal spray    gabapentin (NEURONTIN) 300 MG capsule    hydroCHLOROthiazide (MICROZIDE) 12.5 mg capsule    latanoprost 0.005 % ophthalmic solution    LIDOcaine HCL 2% (XYLOCAINE) 2 % jelly    loratadine (CLARITIN ORAL)    metFORMIN (GLUCOPHAGE-XR) 500 MG 24 hr tablet    multivitamin (THERAGRAN) per tablet    ondansetron (ZOFRAN-ODT) 8 MG TbDL    oxybutynin (DITROPAN-XL) 5 MG TR24    prasterone, dhea, (INTRAROSA) 6.5 mg Inst    promethazine (PHENERGAN) 25 MG tablet    venlafaxine (EFFEXOR XR) 75 MG 24 hr capsule     No current facility-administered medications for this visit.     Facility-Administered Medications Ordered in Other Visits   Medication Frequency    lidocaine HCL 10 mg/ml (1%) 50 mL, EPINEPHrine 1,000 mcg in lactated Ringers 1,000 mL irrigation On Call Procedure       Objective:  Shu Mendoza arrived promptly for the session.    Ms. Mendoza was independently ambulatory at the time of session. The patient was fully cooperative throughout the session.  Appearance: age appropriate, appropriately  dressed, adequately  groomed  Behavior/Cooperation: friendly and cooperative  Speech: normal in rate, volume, and tone and appropriate quality, quantity and organization of sentences  Mood: sad  Affect: mood congruent  Thought Process: goal-directed, logical  Thought Content: normal,  No delusions or paranoia; did not appear to be responding to internal stimuli during the session  Orientation: grossly intact  Memory: Grossly intact  Attention Span/Concentration: Attends to session without distraction; reports no difficulty  Fund of Knowledge: average  Estimate of Intelligence: average from verbal skills and history  Cognition: grossly intact  Insight: patient has awareness of illness; good insight into own behavior and behavior of others  Judgment: the patient's behavior is adequate to circumstances    Interval history and content of current  session: Patient with increased grief-related sadness after her son's death.   Discussed grief coping skills. Reports to be coping with great difficulty. Evaluated cognitive response, paying particular attention to negative intrusive thoughts of a persistent and detrimental nature. Thoughts of this type are in evidence with moderate distress. Provided cognitive behavioral therapy to address negative cognitions. Identified and evaluated psychosocial and environmental stressors secondary to diagnosis and treatment.  Examined proactive behaviors that may be implemented to minimize or ameliorate psychosocial stressors secondary to diagnosis and treatment.     Risk parameters:   Patient reports no suicidal ideation  Patient reports no homicidal ideation  Patient reports no self-injurious behavior  Patient reports no violent behavior   Safety needs:  None at this time      Verbal deficits: None     Patient's response to intervention:The patient's response to intervention is accepting.     Progress toward goals and other mental status changes:  The patient's progress toward goals is good.      Progress to date:Progress as Expected      Goals from last visit: Met     Patient reported outcomes:    Distress Thermometer:   Distress Score    Distress Score: 5        Practical Problems Physical Problems   : No Appearance: No   Housing: No Bathing / Dressing: No   Insurance / Financial: No Breathing: No    Transportation: No  Changes in Urination: No    Work / School: No  Constipation: No   Treatment Decisions: No  Diarrhea: No     Eating: Yes    Family Problems Fatigue: Yes    Dealing with Children: No Feeling Swollen: Yes    Dealing with Partner: No Fevers: No    Ability to Have Children: No  Getting Around: No       Indigestion: No     Memory / Concentration: Yes   Emotional Problems Mouth Sores: No    Depression: Yes  Nausea: Yes    Fears: Yes  Nose Dry / Congested: Yes    Nervousness: Yes  Pain: No    Sadness: Yes  Sexual: No    Worry: Yes Skin Dry / Itchy: No    Loss of Interest in Usual Activities: No Sleep: Yes     Substance Abuse: No    Spiritual/Religions Concerns Tingling in Hands / Feet: Yes   Spritual / Baptist Concerns: No         Other Problems            PHQ ANSWERS    Q1. Little interest or pleasure in doing things: (P) Not at all (04/21/22 1131)  Q2. Feeling down, depressed, or hopeless: (P) Several days (04/21/22 1131)  Q3. Trouble falling or staying asleep, or sleeping too much: (P) Several days (04/21/22 1131)  Q4. Feeling tired or having little energy: (P) Several days (04/21/22 1131)  Q5. Poor appetite or overeating: (P) Several days (04/21/22 1131)  Q6. Feeling bad about yourself - or that you are a failure or have let yourself or your family down: (P) Several days (04/21/22 1131)  Q7. Trouble concentrating on things, such as reading the newspaper or watching television: (P) Several days (04/21/22 1131)  Q8. Moving or speaking so slowly that other people could have noticed. Or the opposite - being so fidgety or restless that you have been moving around a lot more than usual: (P) Not at all (04/21/22 1131)  Q9.  NO SI    PHQ8 Score : (P) 6 (04/21/22 1131)  PHQ-9 Total Score: (P) 6 (04/21/22 1131)     RENITA-7 Answers    GAD7 4/21/2022   1. Feeling nervous, anxious, or on edge? 1   2. Not being able to stop or control worrying? 1   3. Worrying too much about different things? 1   4. Trouble relaxing? 1   5. Being so restless that it is hard to sit still? 0   6. Becoming easily annoyed or irritable? 0   7. Feeling afraid as if something awful might happen? 1   RENITA-7 Score 5     RENITA-7 Score: (P) 5  Interpretation: (P) Mild Anxiety     Client Strengths: verbal, intelligent, successful, good social support, good insight, commitment to wellness, strong fredy, strong cultural traditions     Diagnosis:     ICD-10-CM ICD-9-CM   1. Adjustment disorder with mixed anxiety and depressed mood  F43.23 309.28   2. Grief   F43.21 309.0   3. Personal history of malignant neoplasm of breast  Z85.3 V10.3       Treatment Plan:individual psychotherapy and medication management by physician  · Target symptoms: grief  · Why chosen therapy is appropriate versus another modality: relevant to diagnosis, patient responds to this modality, evidence based practice  · Outcome monitoring methods: self-report, observation, checklist/rating scale  · Therapeutic intervention type: behavior modifying psychotherapy  · Prognosis: Good      Behavioral goals:    Exercise:   Stress management:   Social engagement:   Nutrition:   Smoking Cessation:   Therapy:  Pleasant events scheduling and increased social interaction  Monitor stressors in writing and bring to next visit  Learn and utilize emotion management strategies    Return to clinic: as scheduled       Length of Service (minutes direct face-to-face contact): 60    Marry New, PhD  Clinical Psychologist  [place] License #[id number]  [place] License #[id number]     lr

## 2024-09-06 NOTE — ASU DISCHARGE PLAN (ADULT/PEDIATRIC) - NURSING INSTRUCTIONS
ice packs to left cheek on & off x 24hrs  You were given intravenous TYLENOL for pain management at ___4:25pm_____. Please DO NOT take any products containing TYLENOL or ACETAMINOPHEN, such as VICODIN, PERCOCET, EXCEDRIN, and any over-the-counter cold medication for the next 6 hours (until ___10:25pm_______). DO NOT TAKE MORE THAN 3000 MG OF TYLENOL in a 24 hour period.

## 2024-09-09 PROBLEM — K13.70 UNSPECIFIED LESIONS OF ORAL MUCOSA: Chronic | Status: ACTIVE | Noted: 2024-08-29

## 2024-09-10 LAB — SURGICAL PATHOLOGY STUDY: SIGNIFICANT CHANGE UP

## 2024-09-11 ENCOUNTER — APPOINTMENT (OUTPATIENT)
Dept: OTOLARYNGOLOGY | Facility: CLINIC | Age: 74
End: 2024-09-11
Payer: MEDICARE

## 2024-09-11 DIAGNOSIS — C03.9 MALIGNANT NEOPLASM OF GUM, UNSPECIFIED: ICD-10-CM

## 2024-09-11 PROCEDURE — 99214 OFFICE O/P EST MOD 30 MIN: CPT | Mod: 24

## 2024-09-11 NOTE — PHYSICAL EXAM
[FreeTextEntry1] : there is bx ulceration at gingivolabila site adjacent L kia canine.  seems to be mobile over mandible and adj canine and premolar stable [Normal] : no rashes

## 2024-09-11 NOTE — CONSULT LETTER
[Dear  ___] : Dear  [unfilled], [Courtesy Letter:] : I had the pleasure of seeing your patient, [unfilled], in my office today. [Please see my note below.] : Please see my note below. [Sincerely,] : Sincerely, [FreeTextEntry2] : Megan Mayen, DMD ([place], [place])   [FreeTextEntry3] : Cliff Perez MD, FACS     [facility] Associate Chair    Department of Otolaryngology  Professor Otolaryngology & Molecular Medicine [facility]

## 2024-09-11 NOTE — HISTORY OF PRESENT ILLNESS
[de-identified] : 74yo male presents for a post op follow up and treatment discussion. 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 He is s/p  excision of left anterior mandibular gingival labial lesion on 9/6/2024  biopsy showing Squamous cell carcinoma, well to moderately differentiated. Eating ok, weight stable.

## 2024-09-16 ENCOUNTER — APPOINTMENT (OUTPATIENT)
Dept: CT IMAGING | Facility: CLINIC | Age: 74
End: 2024-09-16
Payer: MEDICARE

## 2024-09-16 ENCOUNTER — OUTPATIENT (OUTPATIENT)
Dept: OUTPATIENT SERVICES | Facility: HOSPITAL | Age: 74
LOS: 1 days | End: 2024-09-16
Payer: MEDICARE

## 2024-09-16 DIAGNOSIS — Z98.52 VASECTOMY STATUS: Chronic | ICD-10-CM

## 2024-09-16 DIAGNOSIS — C03.9 MALIGNANT NEOPLASM OF GUM, UNSPECIFIED: ICD-10-CM

## 2024-09-16 DIAGNOSIS — Z98.890 OTHER SPECIFIED POSTPROCEDURAL STATES: Chronic | ICD-10-CM

## 2024-09-16 PROCEDURE — 70487 CT MAXILLOFACIAL W/DYE: CPT

## 2024-09-16 PROCEDURE — 70487 CT MAXILLOFACIAL W/DYE: CPT | Mod: 26,MH

## 2024-09-16 PROCEDURE — 70491 CT SOFT TISSUE NECK W/DYE: CPT | Mod: 26,MH

## 2024-09-16 PROCEDURE — 70491 CT SOFT TISSUE NECK W/DYE: CPT

## 2024-09-25 ENCOUNTER — APPOINTMENT (OUTPATIENT)
Dept: OTOLARYNGOLOGY | Facility: CLINIC | Age: 74
End: 2024-09-25
Payer: MEDICARE

## 2024-09-25 DIAGNOSIS — C03.9 MALIGNANT NEOPLASM OF GUM, UNSPECIFIED: ICD-10-CM

## 2024-09-25 PROCEDURE — 99214 OFFICE O/P EST MOD 30 MIN: CPT

## 2024-09-25 NOTE — CONSULT LETTER
[Dear  ___] : Dear  [unfilled], [Courtesy Letter:] : I had the pleasure of seeing your patient, [unfilled], in my office today. [Please see my note below.] : Please see my note below. [Sincerely,] : Sincerely, [FreeTextEntry2] : Megan Walters DMD ([place], [place]) [FreeTextEntry3] : Cilff Perez MD, FACS     [facility] Associate Chair    Department of Otolaryngology  Professor Otolaryngology & Molecular Medicine [facility]

## 2024-09-25 NOTE — HISTORY OF PRESENT ILLNESS
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 He is s/p excision of left anterior mandibular gingival labial lesion on 9/6/2024 biopsy showing Squamous cell carcinoma, well to moderately differentiated.  here for preop eval and discussion  Eating ok, weight stable.

## 2024-09-25 NOTE — PHYSICAL EXAM
[FreeTextEntry1] : L kia ging labila lesion dooes not extend to dental margin.  [Normal] : no rashes

## 2024-09-25 NOTE — HISTORY OF PRESENT ILLNESS
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 He is s/p excision of left anterior mandibular gingival labial lesion on 9/6/2024 biopsy showing Squamous cell carcinoma, well to moderately differentiated.  here for preop eval and discussion  Eating ok, weight stable.

## 2024-09-25 NOTE — CONSULT LETTER
[Dear  ___] : Dear  [unfilled], [Courtesy Letter:] : I had the pleasure of seeing your patient, [unfilled], in my office today. [Please see my note below.] : Please see my note below. [Sincerely,] : Sincerely, [FreeTextEntry2] : Megan Walters DMD ([place], [place]) [FreeTextEntry3] : Cliff Perez MD, FACS     [facility] Associate Chair    Department of Otolaryngology  Professor Otolaryngology & Molecular Medicine [facility]

## 2024-09-30 ENCOUNTER — OUTPATIENT (OUTPATIENT)
Dept: OUTPATIENT SERVICES | Facility: HOSPITAL | Age: 74
LOS: 1 days | End: 2024-09-30

## 2024-09-30 VITALS
SYSTOLIC BLOOD PRESSURE: 134 MMHG | OXYGEN SATURATION: 98 % | HEART RATE: 78 BPM | DIASTOLIC BLOOD PRESSURE: 84 MMHG | TEMPERATURE: 98 F | RESPIRATION RATE: 16 BRPM | HEIGHT: 68 IN | WEIGHT: 195.99 LBS

## 2024-09-30 DIAGNOSIS — Z98.890 OTHER SPECIFIED POSTPROCEDURAL STATES: Chronic | ICD-10-CM

## 2024-09-30 DIAGNOSIS — C03.9 MALIGNANT NEOPLASM OF GUM, UNSPECIFIED: ICD-10-CM

## 2024-09-30 DIAGNOSIS — Z98.89 OTHER SPECIFIED POSTPROCEDURAL STATES: Chronic | ICD-10-CM

## 2024-09-30 DIAGNOSIS — Z98.52 VASECTOMY STATUS: Chronic | ICD-10-CM

## 2024-09-30 RX ORDER — SODIUM CHLORIDE IRRIG SOLUTION 0.9 %
1000 SOLUTION, IRRIGATION IRRIGATION
Refills: 0 | Status: DISCONTINUED | OUTPATIENT
Start: 2024-10-07 | End: 2024-10-07

## 2024-09-30 NOTE — H&P PST ADULT - OTHER CARE PROVIDERS
Dr. Chris Myers ( Dermatologist) [phone] , Dr. Megan Walters ( [phone]) Dentist [phone] Dr. Chris Myers ( Dermatologist) [phone] , Dr. Megan Walters ( [phone]) Dentist [phone]

## 2024-09-30 NOTE — H&P PST ADULT - CARDIOVASCULAR COMMENTS
Patient was seen at [facility] on March 4, 2024 with c/o chest pain. Patient was admitted and evaluated. Echo and stress was normal. No further testing was recommended

## 2024-09-30 NOTE — H&P PST ADULT - MUSCULOSKELETAL
details… ROM intact/normal gait/extremities exam ROM intact/no calf tenderness/normal gait/extremities exam

## 2024-09-30 NOTE — H&P PST ADULT - LAST STRESS TEST
c/o CP -March 5 2024 -Normal myocardial perfusion scan, with no evidence of infarction or inducible ischemia.The left ventricle is normal in function and normal in size. The post stress left ventricular EF is 61 % c/o CP -March 5 2024 -Normal myocardial perfusion scan, with no evidence of infarction or inducible ischemia. The left ventricle is normal in function and normal in size. The post stress left ventricular EF is 61 %

## 2024-09-30 NOTE — H&P PST ADULT - CARDIOVASCULAR
regular rate and rhythm/S1 S2 present details… regular rate and rhythm/S1 S2 present/no pedal edema/vascular

## 2024-09-30 NOTE — H&P PST ADULT - NSICDXPASTMEDICALHX_GEN_ALL_CORE_FT
PAST MEDICAL HISTORY:  Contact dermatitis     Eczema     H/O neuropathy left foot    Lumbar herniated disc     Malignant neoplasm of mouth, unspecified     Unspecified lesions of oral mucosa      PAST MEDICAL HISTORY:  Contact dermatitis     Eczema     H/O neuropathy left foot    Lumbar herniated disc     Malignant neoplasm of mouth, unspecified     Primary cancer of gingival mucosa     Unspecified lesions of oral mucosa

## 2024-09-30 NOTE — H&P PST ADULT - FUNCTIONAL STATUS
Dasi 7.99/4-10 METS Mets Dasi score 6.61 Walks 1 to 2 blocks, climbs 1 flight of stairs, ADLs/4-10 METS

## 2024-09-30 NOTE — H&P PST ADULT - LAST ECHOCARDIOGRAM
c/o CP 3/5/24,EF 64%, normal left ventricular diastolic functions, mildly enlarged right ventricular cavity size, Left and right atrium normal, Patient had echo and stress and patient was seen in March 2024 for chest pain in [facility] and  echo and stress was normal and was told that its anxiety.

## 2024-09-30 NOTE — H&P PST ADULT - PROBLEM SELECTOR PLAN 1
Patient tentatively scheduled for excision of left gum lesion , full thickness skin graft on 10/07/2024  Pre-op instructions provided. Pt given verbal and written instructions with teach back on pepcid. Pt verbalized understanding with return demonstration. Patient tentatively scheduled for excision of left gum lesion , full thickness skin graft on 10/07/2024  Pre-op instructions provided. Pt given verbal and written instructions with teach back on pepcid. Pt verbalized understanding with return demonstration.  Labs done.

## 2024-09-30 NOTE — H&P PST ADULT - HISTORY OF PRESENT ILLNESS
73 yr oldmale with peripheral neuropathy, h/o microdiscectomy with preop dx of lesions of oral mucosa presents to have PST eval for biopsy of vestibule of mouth.    Patient was seen for a subsequent evaluation for s/p Excision of white buccal mandibular gingival lesion on 4/18/2022.    Pt stated he was at the dentist last week and saw an area of his left lower mucosa that should be evaluated. As per pt it is in the same area that he had previous biopsy on in April 2022, which showed pathology consistent with mild dysplasia. 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  s/p Excision of white buccal mandibular gingival lesion on 4/18/2022. 73 year old male with hx of peripheral neuropathy, microdiscectomy , SCCA and dysplasia in oral cavity,  is s/p excision of left anterior mandibular gingival labial lesion on 9/6/2024,  biopsy showing squamous cell carcinoma, well to moderately differentiated presents to PST with pre op dx of primary cancer of gingival mucosa is scheduled for excision of left gum lesion , full thickness skin graft.          73 year old male with hx of peripheral neuropathy, microdiscectomy , SCCA and dysplasia in oral cavity  s/p excision of left anterior mandibular gingival labial lesion on 9/6/2024,  biopsy showed squamous cell carcinoma, well to moderately differentiated presents to PST with pre op dx of primary cancer of gingival mucosa is scheduled for excision of left gum lesion , full thickness skin graft.

## 2024-09-30 NOTE — H&P PST ADULT - ENMT COMMENTS
Preop dx Unspecified lesions of oral mucosa, No loose teeth, Mallampati Unable to visualize denies loose teeth or dentures, was referred by dentist to Dr Perez for evaluating his left lower mucosal lesion denies loose teeth or dentures, was referred by dentist  for evaluating his left lower mucosal lesion

## 2024-09-30 NOTE — H&P PST ADULT - NSALCOHOLFREQ_GEN_A_CORE_SD
0800- patient assessment done.  Condition will continue to be monitored.     1555- Patient states that she understands all discharge instructions and has no questions at this time.    monthly or less

## 2024-10-02 NOTE — ED CDU PROVIDER DISPOSITION NOTE - NS ED MD DISPO DISCHARGE
[Straight Catheterization] : insertion of a straight catheter [Urinary Tract Infection] : a urinary tract infection [Patient] : the patient [___ Fr Straight Tip] : a [unfilled] in [place] straight tip catheter [None] : there were no complications with the catheter insertion [Cloudy] : cloudy [Culture] : culture [Urinalysis] : urinalysis [No Complications] : no complications [Tolerated Well] : the patient tolerated the procedure well [Post procedure instructions and information given] : Post procedure instructions and information were given and reviewed with patient. [0] : 0 Home

## 2024-10-04 NOTE — ASU PATIENT PROFILE, ADULT - NSICDXPASTMEDICALHX_GEN_ALL_CORE_FT
PAST MEDICAL HISTORY:  Contact dermatitis     Eczema     H/O neuropathy left foot    Lumbar herniated disc     Malignant neoplasm of mouth, unspecified     Primary cancer of gingival mucosa     Unspecified lesions of oral mucosa

## 2024-10-04 NOTE — ASU PATIENT PROFILE, ADULT - NS PRO ABUSE SCREEN AFRAID ANYONE YN
Airway  Urgency: elective    Date/Time: 10/5/2022 1:31 PM  Airway not difficult    General Information and Staff    Patient location during procedure: OR  CRNA/CAA: Rodger Magallon CAA    Indications and Patient Condition  Indications for airway management: airway protection    Preoxygenated: yes  Mask difficulty assessment: 2 - vent by mask + OA or adjuvant +/- NMBA    Final Airway Details  Final airway type: endotracheal airway      Successful airway: ETT  Cuffed: yes   Successful intubation technique: direct laryngoscopy  Endotracheal tube insertion site: oral  Blade: Gaudencio  Blade size: 4  ETT size (mm): 7.5  Cormack-Lehane Classification: grade I - full view of glottis  Placement verified by: chest auscultation and capnometry   Measured from: lips  ETT/EBT  to lips (cm): 23  Number of attempts at approach: 1  Assessment: lips, teeth, and gum same as pre-op and atraumatic intubation              
no

## 2024-10-07 ENCOUNTER — OUTPATIENT (OUTPATIENT)
Dept: INPATIENT UNIT | Facility: HOSPITAL | Age: 74
LOS: 1 days | Discharge: HOME | End: 2024-10-07
Payer: MEDICARE

## 2024-10-07 ENCOUNTER — RESULT REVIEW (OUTPATIENT)
Age: 74
End: 2024-10-07

## 2024-10-07 ENCOUNTER — TRANSCRIPTION ENCOUNTER (OUTPATIENT)
Age: 74
End: 2024-10-07

## 2024-10-07 ENCOUNTER — APPOINTMENT (OUTPATIENT)
Dept: OTOLARYNGOLOGY | Facility: HOSPITAL | Age: 74
End: 2024-10-07

## 2024-10-07 VITALS
HEIGHT: 68 IN | DIASTOLIC BLOOD PRESSURE: 85 MMHG | HEART RATE: 83 BPM | RESPIRATION RATE: 16 BRPM | OXYGEN SATURATION: 96 % | WEIGHT: 195.99 LBS | TEMPERATURE: 98 F | SYSTOLIC BLOOD PRESSURE: 168 MMHG

## 2024-10-07 VITALS
SYSTOLIC BLOOD PRESSURE: 129 MMHG | TEMPERATURE: 98 F | OXYGEN SATURATION: 100 % | RESPIRATION RATE: 16 BRPM | HEART RATE: 73 BPM | DIASTOLIC BLOOD PRESSURE: 83 MMHG

## 2024-10-07 DIAGNOSIS — Z98.52 VASECTOMY STATUS: Chronic | ICD-10-CM

## 2024-10-07 DIAGNOSIS — Z98.890 OTHER SPECIFIED POSTPROCEDURAL STATES: Chronic | ICD-10-CM

## 2024-10-07 DIAGNOSIS — Z98.89 OTHER SPECIFIED POSTPROCEDURAL STATES: Chronic | ICD-10-CM

## 2024-10-07 DIAGNOSIS — C03.9 MALIGNANT NEOPLASM OF GUM, UNSPECIFIED: ICD-10-CM

## 2024-10-07 PROCEDURE — 88333 PATH CONSLTJ SURG CYTO XM 1: CPT | Mod: 26

## 2024-10-07 PROCEDURE — 88305 TISSUE EXAM BY PATHOLOGIST: CPT | Mod: 26

## 2024-10-07 RX ORDER — OXYCODONE HYDROCHLORIDE 30 MG/1
1 TABLET, FILM COATED, EXTENDED RELEASE ORAL
Qty: 4 | Refills: 0
Start: 2024-10-07 | End: 2024-10-07

## 2024-10-07 RX ORDER — ONDANSETRON HCL/PF 4 MG/2 ML
4 VIAL (ML) INJECTION ONCE
Refills: 0 | Status: DISCONTINUED | OUTPATIENT
Start: 2024-10-07 | End: 2024-10-07

## 2024-10-07 RX ORDER — METOCLOPRAMIDE HCL 5 MG
10 TABLET ORAL ONCE
Refills: 0 | Status: DISCONTINUED | OUTPATIENT
Start: 2024-10-07 | End: 2024-10-07

## 2024-10-07 RX ORDER — FENTANYL CITRATE-0.9 % NACL/PF 300MCG/30
50 PATIENT CONTROLLED ANALGESIA VIAL INJECTION
Refills: 0 | Status: DISCONTINUED | OUTPATIENT
Start: 2024-10-07 | End: 2024-10-07

## 2024-10-07 RX ORDER — HYDROMORPHONE HYDROCHLORIDE 1 MG/ML
0.5 INJECTION, SOLUTION INTRAMUSCULAR; INTRAVENOUS; SUBCUTANEOUS
Refills: 0 | Status: DISCONTINUED | OUTPATIENT
Start: 2024-10-07 | End: 2024-10-07

## 2024-10-07 NOTE — ASU DISCHARGE PLAN (ADULT/PEDIATRIC) - CARE PROVIDER_API CALL
Cliff Perez  Head/Neck Surgery  [address] [place] [address]  Phone: [phone]  Fax: [phone]  Follow Up Time:

## 2024-10-07 NOTE — ASU DISCHARGE PLAN (ADULT/PEDIATRIC) - ASU DC SPECIAL INSTRUCTIONSFT
Take tylenol or oxycodone for pain as needed every 6 hours.    Please call clinic or come to ED if you have bleeding that does not stop or issues with breathing.      Warning Signs:  Please call your doctor or nurse practitioner if you experience the following:  *You experience new chest pain, pressure, squeezing or tightness.  *New or worsening cough, shortness of breath, or wheeze.  *If you are vomiting and cannot keep down fluids or your medications.  *You are getting dehydrated due to continued vomiting, diarrhea, or other reasons. Signs of dehydration include dry mouth, rapid heartbeat, or feeling dizzy or faint when standing.  *You experience burning when you urinate, have blood in your urine, or experience a discharge.  *Your pain is not improving within 8-12 hours or is not gone within 24 hours.  *You have shaking chills, or fever greater than 101.5 degrees Fahrenheit or 38 degrees Celsius.  *Any change in your symptoms, or any new symptoms that concern you. Take Tylenol or oxycodone for pain as needed every 6 hours.    Please call clinic or come to ED if you have bleeding that does not stop or issues with breathing.      Warning Signs:  Please call your doctor or nurse practitioner if you experience the following:  *You experience new chest pain, pressure, squeezing or tightness.  *New or worsening cough, shortness of breath, or wheeze.  *If you are vomiting and cannot keep down fluids or your medications.  *You are getting dehydrated due to continued vomiting, diarrhea, or other reasons. Signs of dehydration include dry mouth, rapid heartbeat, or feeling dizzy or faint when standing.  *You experience burning when you urinate, have blood in your urine, or experience a discharge.  *Your pain is not improving within 8-12 hours or is not gone within 24 hours.  *You have shaking chills, or fever greater than 101.5 degrees Fahrenheit or 38 degrees Celsius.  *Any change in your symptoms, or any new symptoms that concern you.

## 2024-10-08 PROBLEM — C03.9 MALIGNANT NEOPLASM OF GUM, UNSPECIFIED: Chronic | Status: ACTIVE | Noted: 2024-09-30

## 2024-10-10 ENCOUNTER — APPOINTMENT (OUTPATIENT)
Dept: PLASTIC SURGERY | Facility: CLINIC | Age: 74
End: 2024-10-10

## 2024-10-10 VITALS
HEART RATE: 68 BPM | BODY MASS INDEX: 28.97 KG/M2 | TEMPERATURE: 98.5 F | HEIGHT: 68 IN | OXYGEN SATURATION: 96 % | DIASTOLIC BLOOD PRESSURE: 89 MMHG | SYSTOLIC BLOOD PRESSURE: 154 MMHG | WEIGHT: 191.13 LBS

## 2024-10-10 LAB — SURGICAL PATHOLOGY STUDY: SIGNIFICANT CHANGE UP

## 2024-10-10 PROCEDURE — 99214 OFFICE O/P EST MOD 30 MIN: CPT

## 2024-10-14 ENCOUNTER — APPOINTMENT (OUTPATIENT)
Dept: OTOLARYNGOLOGY | Facility: CLINIC | Age: 74
End: 2024-10-14

## 2024-10-15 ENCOUNTER — APPOINTMENT (OUTPATIENT)
Age: 74
End: 2024-10-15
Payer: MEDICARE

## 2024-10-15 PROCEDURE — 99204 OFFICE O/P NEW MOD 45 MIN: CPT

## 2024-10-24 ENCOUNTER — APPOINTMENT (OUTPATIENT)
Age: 74
End: 2024-10-24

## 2024-10-24 ENCOUNTER — TRANSCRIPTION ENCOUNTER (OUTPATIENT)
Age: 74
End: 2024-10-24

## 2024-10-24 ENCOUNTER — APPOINTMENT (OUTPATIENT)
Dept: OTOLARYNGOLOGY | Facility: HOSPITAL | Age: 74
End: 2024-10-24

## 2024-10-24 ENCOUNTER — INPATIENT (INPATIENT)
Facility: HOSPITAL | Age: 74
LOS: 7 days | Discharge: HOME | End: 2024-11-01
Attending: OTOLARYNGOLOGY | Admitting: OTOLARYNGOLOGY
Payer: MEDICARE

## 2024-10-24 VITALS
RESPIRATION RATE: 14 BRPM | HEART RATE: 90 BPM | DIASTOLIC BLOOD PRESSURE: 86 MMHG | SYSTOLIC BLOOD PRESSURE: 150 MMHG | HEIGHT: 68 IN | WEIGHT: 195.99 LBS | OXYGEN SATURATION: 96 % | TEMPERATURE: 98 F

## 2024-10-24 DIAGNOSIS — Z98.890 OTHER SPECIFIED POSTPROCEDURAL STATES: Chronic | ICD-10-CM

## 2024-10-24 DIAGNOSIS — Z98.52 VASECTOMY STATUS: Chronic | ICD-10-CM

## 2024-10-24 DIAGNOSIS — Z98.89 OTHER SPECIFIED POSTPROCEDURAL STATES: Chronic | ICD-10-CM

## 2024-10-24 DIAGNOSIS — C03.9 MALIGNANT NEOPLASM OF GUM, UNSPECIFIED: ICD-10-CM

## 2024-10-24 LAB
ANION GAP SERPL CALC-SCNC: 16 MMOL/L — HIGH (ref 7–14)
BUN SERPL-MCNC: 16 MG/DL — SIGNIFICANT CHANGE UP (ref 7–23)
CALCIUM SERPL-MCNC: 8.2 MG/DL — LOW (ref 8.4–10.5)
CHLORIDE SERPL-SCNC: 100 MMOL/L — SIGNIFICANT CHANGE UP (ref 98–107)
CO2 SERPL-SCNC: 23 MMOL/L — SIGNIFICANT CHANGE UP (ref 22–31)
CREAT SERPL-MCNC: 0.82 MG/DL — SIGNIFICANT CHANGE UP (ref 0.5–1.3)
EGFR: 92 ML/MIN/1.73M2 — SIGNIFICANT CHANGE UP
GAS PNL BLDA: SIGNIFICANT CHANGE UP
GAS PNL BLDA: SIGNIFICANT CHANGE UP
GLUCOSE SERPL-MCNC: 214 MG/DL — HIGH (ref 70–99)
HCT VFR BLD CALC: 34.3 % — LOW (ref 39–50)
HGB BLD-MCNC: 11.4 G/DL — LOW (ref 13–17)
MAGNESIUM SERPL-MCNC: 1.7 MG/DL — SIGNIFICANT CHANGE UP (ref 1.6–2.6)
MCHC RBC-ENTMCNC: 29.8 PG — SIGNIFICANT CHANGE UP (ref 27–34)
MCHC RBC-ENTMCNC: 33.2 GM/DL — SIGNIFICANT CHANGE UP (ref 32–36)
MCV RBC AUTO: 89.6 FL — SIGNIFICANT CHANGE UP (ref 80–100)
NRBC # BLD: 0 /100 WBCS — SIGNIFICANT CHANGE UP (ref 0–0)
NRBC # FLD: 0 K/UL — SIGNIFICANT CHANGE UP (ref 0–0)
PHOSPHATE SERPL-MCNC: 4.2 MG/DL — SIGNIFICANT CHANGE UP (ref 2.5–4.5)
PLATELET # BLD AUTO: 185 K/UL — SIGNIFICANT CHANGE UP (ref 150–400)
POTASSIUM SERPL-MCNC: 4 MMOL/L — SIGNIFICANT CHANGE UP (ref 3.5–5.3)
POTASSIUM SERPL-SCNC: 4 MMOL/L — SIGNIFICANT CHANGE UP (ref 3.5–5.3)
RBC # BLD: 3.83 M/UL — LOW (ref 4.2–5.8)
RBC # FLD: 14.5 % — SIGNIFICANT CHANGE UP (ref 10.3–14.5)
SODIUM SERPL-SCNC: 139 MMOL/L — SIGNIFICANT CHANGE UP (ref 135–145)
WBC # BLD: 16.56 K/UL — HIGH (ref 3.8–10.5)
WBC # FLD AUTO: 16.56 K/UL — HIGH (ref 3.8–10.5)

## 2024-10-24 PROCEDURE — 88311 DECALCIFY TISSUE: CPT | Mod: 26

## 2024-10-24 PROCEDURE — 88309 TISSUE EXAM BY PATHOLOGIST: CPT | Mod: 26

## 2024-10-24 PROCEDURE — D7140: CPT

## 2024-10-24 PROCEDURE — 88331 PATH CONSLTJ SURG 1 BLK 1SPC: CPT | Mod: 26

## 2024-10-24 PROCEDURE — 15100 SPLT AGRFT T/A/L 1ST 100SQCM: CPT

## 2024-10-24 PROCEDURE — 21044 REMOVAL OF JAW BONE LESION: CPT | Mod: GC,LT,62

## 2024-10-24 PROCEDURE — 15757 FREE SKIN FLAP MICROVASC: CPT

## 2024-10-24 PROCEDURE — 40840 RECONSTRUCTION OF MOUTH: CPT

## 2024-10-24 PROCEDURE — 21044 REMOVAL OF JAW BONE LESION: CPT | Mod: 62

## 2024-10-24 PROCEDURE — 88332 PATH CONSLTJ SURG EA ADD BLK: CPT | Mod: 26

## 2024-10-24 PROCEDURE — 88307 TISSUE EXAM BY PATHOLOGIST: CPT | Mod: 26

## 2024-10-24 PROCEDURE — 38724 REMOVAL OF LYMPH NODES NECK: CPT | Mod: GC,LT

## 2024-10-24 PROCEDURE — 71045 X-RAY EXAM CHEST 1 VIEW: CPT | Mod: 26

## 2024-10-24 PROCEDURE — 88305 TISSUE EXAM BY PATHOLOGIST: CPT | Mod: 26

## 2024-10-24 PROCEDURE — 14301 TIS TRNFR ANY 30.1-60 SQ CM: CPT

## 2024-10-24 PROCEDURE — 14302 TIS TRNFR ADDL 30 SQ CM: CPT

## 2024-10-24 DEVICE — LIGATING CLIPS WECK HORIZON MEDIUM (BLUE) 24: Type: IMPLANTABLE DEVICE | Status: FUNCTIONAL

## 2024-10-24 DEVICE — COUPLER VESSEL ANASTOMOTIC 3MM: Type: IMPLANTABLE DEVICE | Status: FUNCTIONAL

## 2024-10-24 DEVICE — IMPLANTABLE DEVICE: Type: IMPLANTABLE DEVICE | Status: FUNCTIONAL

## 2024-10-24 DEVICE — CARTRIDGE MICROCLIP 30: Type: IMPLANTABLE DEVICE | Status: FUNCTIONAL

## 2024-10-24 DEVICE — SURGIFOAM PAD 8CM X 12.5CM X 2MM (100C): Type: IMPLANTABLE DEVICE | Status: FUNCTIONAL

## 2024-10-24 DEVICE — SCREW MAXDRIVE 1 LVL 2X7MM: Type: IMPLANTABLE DEVICE | Status: FUNCTIONAL

## 2024-10-24 DEVICE — AGENT HEMOSTATIC HEMOBLAST 1.65G 10CM: Type: IMPLANTABLE DEVICE | Status: FUNCTIONAL

## 2024-10-24 DEVICE — SURGICEL 2 X 14": Type: IMPLANTABLE DEVICE | Status: FUNCTIONAL

## 2024-10-24 DEVICE — CLIP LIG TI SM/WIDE 24/BX: Type: IMPLANTABLE DEVICE | Status: FUNCTIONAL

## 2024-10-24 DEVICE — DOPPLER PROBE DISPOSABLE: Type: IMPLANTABLE DEVICE | Status: FUNCTIONAL

## 2024-10-24 DEVICE — IMP NOBELACTIVE PLATEFORM RP 4.3X10MM: Type: IMPLANTABLE DEVICE | Status: FUNCTIONAL

## 2024-10-24 DEVICE — COUPLER VESSEL MICROVASC ANAST 2MM GRN: Type: IMPLANTABLE DEVICE | Status: FUNCTIONAL

## 2024-10-24 DEVICE — GUIDE RECONSTRUCT IPS CUTTING W/PLANNING LP: Type: IMPLANTABLE DEVICE | Status: FUNCTIONAL

## 2024-10-24 DEVICE — INTEGRA WOUND MATRIX MESHED BILAYER 2X2": Type: IMPLANTABLE DEVICE | Status: FUNCTIONAL

## 2024-10-24 RX ORDER — DEXAMETHASONE 1.5 MG 1.5 MG/1
3 TABLET ORAL EVERY 8 HOURS
Refills: 0 | Status: DISCONTINUED | OUTPATIENT
Start: 2024-10-24 | End: 2024-10-24

## 2024-10-24 RX ORDER — ACETAMINOPHEN 500 MG
975 TABLET ORAL EVERY 6 HOURS
Refills: 0 | Status: DISCONTINUED | OUTPATIENT
Start: 2024-10-24 | End: 2024-10-29

## 2024-10-24 RX ORDER — GABAPENTIN 300 MG/1
600 CAPSULE ORAL DAILY
Refills: 0 | Status: DISCONTINUED | OUTPATIENT
Start: 2024-10-24 | End: 2024-10-29

## 2024-10-24 RX ORDER — OXYCODONE HYDROCHLORIDE 30 MG/1
2.5 TABLET ORAL EVERY 6 HOURS
Refills: 0 | Status: DISCONTINUED | OUTPATIENT
Start: 2024-10-24 | End: 2024-10-29

## 2024-10-24 RX ORDER — OXYCODONE HYDROCHLORIDE 30 MG/1
5 TABLET ORAL EVERY 6 HOURS
Refills: 0 | Status: DISCONTINUED | OUTPATIENT
Start: 2024-10-24 | End: 2024-10-29

## 2024-10-24 RX ORDER — MULTI VITAMIN/MINERAL SUPPLEMENT WITH ASCORBIC ACID, NIACIN, PYRIDOXINE, PANTOTHENIC ACID, FOLIC ACID, RIBOFLAVIN, THIAMIN, BIOTIN, COBALAMIN AND ZINC. 60; 20; 12.5; 10; 10; 1.7; 1.5; 1; .3; .006 MG/1; MG/1; MG/1; MG/1; MG/1; MG/1; MG/1; MG/1; MG/1; MG/1
1 TABLET, COATED ORAL
Refills: 0 | DISCHARGE

## 2024-10-24 RX ORDER — LABETALOL HCL 200 MG
10 TABLET ORAL ONCE
Refills: 0 | Status: COMPLETED | OUTPATIENT
Start: 2024-10-24 | End: 2024-10-24

## 2024-10-24 RX ORDER — AMPICILLIN AND SULBACTAM 2; 1 G/1; G/1
3 INJECTION, POWDER, FOR SOLUTION INTRAMUSCULAR; INTRAVENOUS EVERY 6 HOURS
Refills: 0 | Status: DISCONTINUED | OUTPATIENT
Start: 2024-10-25 | End: 2024-10-25

## 2024-10-24 RX ORDER — SENNA 187 MG
1 TABLET ORAL DAILY
Refills: 0 | Status: DISCONTINUED | OUTPATIENT
Start: 2024-10-25 | End: 2024-10-26

## 2024-10-24 RX ORDER — ACETAMINOPHEN 500 MG
1000 TABLET ORAL ONCE
Refills: 0 | Status: COMPLETED | OUTPATIENT
Start: 2024-10-24 | End: 2024-10-24

## 2024-10-24 RX ORDER — TRIAMCINOLONE ACETONIDE 1 MG/G
1 CREAM TOPICAL
Refills: 0 | DISCHARGE

## 2024-10-24 RX ORDER — POLYETHYLENE GLYCOL 3350 17 G/17G
17 POWDER, FOR SOLUTION ORAL DAILY
Refills: 0 | Status: DISCONTINUED | OUTPATIENT
Start: 2024-10-25 | End: 2024-10-26

## 2024-10-24 RX ORDER — DEXAMETHASONE 1.5 MG 1.5 MG/1
8 TABLET ORAL EVERY 8 HOURS
Refills: 0 | Status: COMPLETED | OUTPATIENT
Start: 2024-10-24 | End: 2024-10-25

## 2024-10-24 RX ORDER — PANTOPRAZOLE SODIUM 40 MG/1
40 TABLET, DELAYED RELEASE ORAL EVERY 24 HOURS
Refills: 0 | Status: DISCONTINUED | OUTPATIENT
Start: 2024-10-24 | End: 2024-10-29

## 2024-10-24 RX ORDER — DEXAMETHASONE 1.5 MG 1.5 MG/1
8 TABLET ORAL EVERY 8 HOURS
Refills: 0 | Status: DISCONTINUED | OUTPATIENT
Start: 2024-10-24 | End: 2024-10-24

## 2024-10-24 RX ORDER — GLUCOSAM/CHON-MSM1/C/MANG/BOSW 750-625MG
1 TABLET ORAL
Refills: 0 | DISCHARGE

## 2024-10-24 RX ORDER — CHLORHEXIDINE GLUCONATE 40 MG/ML
15 SOLUTION TOPICAL
Refills: 0 | Status: DISCONTINUED | OUTPATIENT
Start: 2024-10-24 | End: 2024-10-31

## 2024-10-24 RX ORDER — CLEVIDIPINE BUTYRATE 50MG/100ML
2 VIAL (ML) INTRAVENOUS
Qty: 25 | Refills: 0 | Status: DISCONTINUED | OUTPATIENT
Start: 2024-10-24 | End: 2024-10-26

## 2024-10-24 RX ORDER — ONDANSETRON HYDROCHLORIDE 2 MG/ML
4 INJECTION, SOLUTION INTRAMUSCULAR; INTRAVENOUS EVERY 8 HOURS
Refills: 0 | Status: DISCONTINUED | OUTPATIENT
Start: 2024-10-24 | End: 2024-10-29

## 2024-10-24 RX ORDER — AMPICILLIN AND SULBACTAM 2; 1 G/1; G/1
3 INJECTION, POWDER, FOR SOLUTION INTRAMUSCULAR; INTRAVENOUS ONCE
Refills: 0 | Status: COMPLETED | OUTPATIENT
Start: 2024-10-24 | End: 2024-10-24

## 2024-10-24 RX ORDER — ENOXAPARIN SODIUM 40MG/0.4ML
40 SYRINGE (ML) SUBCUTANEOUS EVERY 24 HOURS
Refills: 0 | Status: DISCONTINUED | OUTPATIENT
Start: 2024-10-24 | End: 2024-10-31

## 2024-10-24 RX ORDER — HYDROMORPHONE HCL/0.9% NACL/PF 6 MG/30 ML
0.5 PATIENT CONTROLLED ANALGESIA SYRINGE INTRAVENOUS ONCE
Refills: 0 | Status: DISCONTINUED | OUTPATIENT
Start: 2024-10-24 | End: 2024-10-24

## 2024-10-24 RX ORDER — CALCIUM GLUCONATE 98 MG/ML
2 INJECTION, SOLUTION INTRAVENOUS ONCE
Refills: 0 | Status: COMPLETED | OUTPATIENT
Start: 2024-10-24 | End: 2024-10-24

## 2024-10-24 RX ORDER — AMPICILLIN AND SULBACTAM 2; 1 G/1; G/1
INJECTION, POWDER, FOR SOLUTION INTRAMUSCULAR; INTRAVENOUS
Refills: 0 | Status: DISCONTINUED | OUTPATIENT
Start: 2024-10-24 | End: 2024-10-25

## 2024-10-24 RX ADMIN — AMPICILLIN AND SULBACTAM 200 GRAM(S): 2; 1 INJECTION, POWDER, FOR SOLUTION INTRAMUSCULAR; INTRAVENOUS at 23:22

## 2024-10-24 RX ADMIN — DEXAMETHASONE 1.5 MG 101.6 MILLIGRAM(S): 1.5 TABLET ORAL at 22:00

## 2024-10-24 RX ADMIN — Medication 0.5 MILLIGRAM(S): at 17:50

## 2024-10-24 RX ADMIN — Medication 975 MILLIGRAM(S): at 23:22

## 2024-10-24 RX ADMIN — Medication 10 MILLIGRAM(S): at 21:59

## 2024-10-24 RX ADMIN — PANTOPRAZOLE SODIUM 40 MILLIGRAM(S): 40 TABLET, DELAYED RELEASE ORAL at 17:53

## 2024-10-24 RX ADMIN — CHLORHEXIDINE GLUCONATE 15 MILLILITER(S): 40 SOLUTION TOPICAL at 18:49

## 2024-10-24 RX ADMIN — Medication 0.5 MILLIGRAM(S): at 18:20

## 2024-10-24 RX ADMIN — Medication 4 MG/HR: at 20:02

## 2024-10-24 RX ADMIN — CALCIUM GLUCONATE 200 GRAM(S): 98 INJECTION, SOLUTION INTRAVENOUS at 20:27

## 2024-10-24 RX ADMIN — AMPICILLIN AND SULBACTAM 200 GRAM(S): 2; 1 INJECTION, POWDER, FOR SOLUTION INTRAMUSCULAR; INTRAVENOUS at 20:02

## 2024-10-24 RX ADMIN — Medication 400 MILLIGRAM(S): at 20:26

## 2024-10-24 RX ADMIN — Medication 1000 MILLIGRAM(S): at 21:00

## 2024-10-24 NOTE — ASU PATIENT PROFILE, ADULT - MUTUALITY COMMENT, PROFILE
Discussed with Pt his ability to have questions answered by pablito main & isael in preop before going into the OR

## 2024-10-24 NOTE — BRIEF OPERATIVE NOTE - NSICDXBRIEFPROCEDURE_GEN_ALL_CORE_FT
PROCEDURES:  Reconstruction using radial forearm free flap 24-Oct-2024 16:21:58  Mag Le  
PROCEDURES:  Left selective neck dissection 24-Oct-2024 16:20:30  Mag Le  Reconstruction using radial forearm free flap 24-Oct-2024 16:21:58  Mag Le  
PROCEDURES:  Partial mandibulectomy 24-Oct-2024 17:58:21  Jarad Maldonado

## 2024-10-24 NOTE — PROGRESS NOTE ADULT - ASSESSMENT
Assessment and Plan:  AUSTYN LINTON is a  73yMale w/ SCC of left gingivolabial sulcus s/p L SND 1-3, excision of gingival lesion and marginal mandibulectomy, L RFFF w/ STSG from L thigh, transferred to SICU for flap monitoring.    PLAN:  - Pain meds (Standing tylenol 975mg q6 hrs or 650mg q6 if <60kg, gabapentin 600mg qd if age <70 and no diuretic rx, opioids as needed)  - Flap checks  q1x24, q2x24, q4x72  - Baci 2x/d to neck incisions  - Lovenox starting POD1 or Heparin 5000units TID if GFR <30   - Feeds POD1  - OOB POD1  - dc lebron POD1  - Unasyn x24h  - Decadron 8mg x3 doses  - Peridex swish and spit BID  - Esomeprazole 20 mg BID or pantoprazole 40 QD  - Bowel regimen (senna + miralax)  - Incentive spirometry  - f/u HbA1c and TSH    Page ENT with any questions/concerns.  Peds Page #[id number]  Adult Page #[phone]    Mag Le  10-24-24 @ 20:46

## 2024-10-24 NOTE — BRIEF OPERATIVE NOTE - OPERATION/FINDINGS
Left neck dissections level 1-3 and excision of lesion of left gingovobuccal sulcus with sacrifice of mental nerve. Marginal mandibulectomy performed by OMFS/reconstruction using left radial forearm free flap by PRS. 
Marginal mandibulectomy completed - left mandible  KLS cutting guide used  Dental implants not placed 
Left radial forearm free flap to reconstruct FOM defect. Facial to radial artery anastomosis. Venous anastomosis using  x2. ACACIA x1 left neck. STSG from left thigh to left forearm. Vac to left forearm.

## 2024-10-24 NOTE — PROGRESS NOTE ADULT - SUBJECTIVE AND OBJECTIVE BOX
OTOLARYNGOLOGY (ENT) PROGRESS NOTE    PATIENT: AUSTYN LINTON  MRN: [id number]  : 48  [id number]  DATE OF SERVICE:  10-24-24  			           Subjective/ Interval: Patient seen and examined at bedside in the SICU. Transferred from OR to SICU w/o complications. NGT confirmed in place on CXR.     ALLERGIES:  latex (Rash)  No Known Drug Allergies      MEDICATIONS:  Antiinfectives:   ampicillin/sulbactam  IVPB        IV fluids:  lactated ringers. 1000 milliLiter(s) IV Continuous <Continuous>    Hematologic/Anticoagulation:  enoxaparin Injectable 40 milliGRAM(s) SubCutaneous every 24 hours    Pain medications/Neuro:  acetaminophen   Oral Liquid .. 975 milliGRAM(s) Oral every 6 hours  gabapentin 600 milliGRAM(s) Oral daily  ondansetron Injectable 4 milliGRAM(s) IV Push every 8 hours PRN  oxyCODONE    Solution 2.5 milliGRAM(s) Oral every 6 hours PRN  oxyCODONE    Solution 5 milliGRAM(s) Oral every 6 hours PRN    Endocrine Medications:   dexAMETHasone  IVPB 8 milliGRAM(s) IV Intermittent every 8 hours    All other standing medications:   chlorhexidine 0.12% Liquid 15 milliLiter(s) Oral Mucosa two times a day  clevidipine Infusion 2 mG/Hr IV Continuous <Continuous>  pantoprazole  Injectable 40 milliGRAM(s) IV Push every 24 hours    All other PRN medications:    Vital Signs Last 24 Hrs  T(C): 36.4 (24 Oct 2024 20:00), Max: 36.6 (24 Oct 2024 06:08)  T(F): 97.5 (24 Oct 2024 20:00), Max: 97.9 (24 Oct 2024 06:08)  HR: 89 (24 Oct 2024 20:00) (89 - 108)  BP: 147/63 (24 Oct 2024 20:00) (137/66 - 158/75)  BP(mean): 85 (24 Oct 2024 20:00) (85 - 117)  RR: 15 (24 Oct 2024 20:00) (12 - 17)  SpO2: 94% (24 Oct 2024 20:00) (93% - 100%)    Parameters below as of 24 Oct 2024 17:15  Patient On (Oxygen Delivery Method): room air          10-24 @ 07:01  -  10-24 @ 20:46  --------------------------------------------------------  IN:    Clevidipine: 128 mL    IV PiggyBack: 300 mL    Lactated Ringers: 400 mL  Total IN: 828 mL    OUT:    Indwelling Catheter - Urethral (mL): 230 mL  Total OUT: 230 mL    Total NET: 598 mL        NAD, resting comfortably in bed   Neck flat and supple, no collection. Incision closed with staples c/d/i  ACACIA L neck w/ SS output  Cook doppler with strong arterial signal. Intraoral skin paddle pink, well perfused. Incisions intact.  OC/OP: no erythema, bleeding, lacerations  Donor site covered w/ ACE bandage, wound vac in place                 LABS                       11.4   16.56 )-----------( 185      ( 24 Oct 2024 18:24 )             34.3    10-24    139  |  100  |  16  ----------------------------<  214[H]  4.0   |  23  |  0.82    Ca    8.2[L]      24 Oct 2024 18:24  Phos  4.2     10-24  Mg     1.70     10-24           Coagulation Studies-     Urinalysis Basic - ( 24 Oct 2024 18:24 )    Color: x / Appearance: x / SG: x / pH: x  Gluc: 214 mg/dL / Ketone: x  / Bili: x / Urobili: x   Blood: x / Protein: x / Nitrite: x   Leuk Esterase: x / RBC: x / WBC x   Sq Epi: x / Non Sq Epi: x / Bacteria: x      Endocrine Panel-  Calcium: 8.2 mg/dL (10-24 @ 18:24)                MICROBIOLOGY:        RADIOLOGY & ADDITIONAL STUDIES:

## 2024-10-25 LAB
A1C WITH ESTIMATED AVERAGE GLUCOSE RESULT: 5.5 % — SIGNIFICANT CHANGE UP (ref 4–5.6)
ANION GAP SERPL CALC-SCNC: 14 MMOL/L — SIGNIFICANT CHANGE UP (ref 7–14)
BUN SERPL-MCNC: 13 MG/DL — SIGNIFICANT CHANGE UP (ref 7–23)
CALCIUM SERPL-MCNC: 8.6 MG/DL — SIGNIFICANT CHANGE UP (ref 8.4–10.5)
CHLORIDE SERPL-SCNC: 102 MMOL/L — SIGNIFICANT CHANGE UP (ref 98–107)
CO2 SERPL-SCNC: 25 MMOL/L — SIGNIFICANT CHANGE UP (ref 22–31)
CREAT SERPL-MCNC: 0.8 MG/DL — SIGNIFICANT CHANGE UP (ref 0.5–1.3)
EGFR: 93 ML/MIN/1.73M2 — SIGNIFICANT CHANGE UP
ESTIMATED AVERAGE GLUCOSE: 111 — SIGNIFICANT CHANGE UP
GLUCOSE SERPL-MCNC: 179 MG/DL — HIGH (ref 70–99)
HCT VFR BLD CALC: 33.2 % — LOW (ref 39–50)
HGB BLD-MCNC: 10.9 G/DL — LOW (ref 13–17)
MCHC RBC-ENTMCNC: 29.6 PG — SIGNIFICANT CHANGE UP (ref 27–34)
MCHC RBC-ENTMCNC: 32.8 GM/DL — SIGNIFICANT CHANGE UP (ref 32–36)
MCV RBC AUTO: 90.2 FL — SIGNIFICANT CHANGE UP (ref 80–100)
MRSA PCR RESULT.: SIGNIFICANT CHANGE UP
NRBC # BLD: 0 /100 WBCS — SIGNIFICANT CHANGE UP (ref 0–0)
NRBC # FLD: 0 K/UL — SIGNIFICANT CHANGE UP (ref 0–0)
PLATELET # BLD AUTO: 174 K/UL — SIGNIFICANT CHANGE UP (ref 150–400)
POTASSIUM SERPL-MCNC: 4.1 MMOL/L — SIGNIFICANT CHANGE UP (ref 3.5–5.3)
POTASSIUM SERPL-SCNC: 4.1 MMOL/L — SIGNIFICANT CHANGE UP (ref 3.5–5.3)
RBC # BLD: 3.68 M/UL — LOW (ref 4.2–5.8)
RBC # FLD: 14.6 % — HIGH (ref 10.3–14.5)
S AUREUS DNA NOSE QL NAA+PROBE: DETECTED
SODIUM SERPL-SCNC: 141 MMOL/L — SIGNIFICANT CHANGE UP (ref 135–145)
TSH SERPL-MCNC: 0.91 UIU/ML — SIGNIFICANT CHANGE UP (ref 0.27–4.2)
WBC # BLD: 14.4 K/UL — HIGH (ref 3.8–10.5)
WBC # FLD AUTO: 14.4 K/UL — HIGH (ref 3.8–10.5)

## 2024-10-25 PROCEDURE — 99291 CRITICAL CARE FIRST HOUR: CPT | Mod: 25

## 2024-10-25 RX ORDER — LABETALOL HCL 200 MG
10 TABLET ORAL ONCE
Refills: 0 | Status: COMPLETED | OUTPATIENT
Start: 2024-10-25 | End: 2024-10-25

## 2024-10-25 RX ORDER — LABETALOL HCL 200 MG
100 TABLET ORAL EVERY 8 HOURS
Refills: 0 | Status: DISCONTINUED | OUTPATIENT
Start: 2024-10-25 | End: 2024-10-26

## 2024-10-25 RX ORDER — LABETALOL HCL 200 MG
10 TABLET ORAL EVERY 6 HOURS
Refills: 0 | Status: DISCONTINUED | OUTPATIENT
Start: 2024-10-25 | End: 2024-10-25

## 2024-10-25 RX ORDER — HYDRALAZINE HYDROCHLORIDE 50 MG/1
10 TABLET, FILM COATED ORAL ONCE
Refills: 0 | Status: COMPLETED | OUTPATIENT
Start: 2024-10-25 | End: 2024-10-25

## 2024-10-25 RX ADMIN — DEXAMETHASONE 1.5 MG 101.6 MILLIGRAM(S): 1.5 TABLET ORAL at 14:28

## 2024-10-25 RX ADMIN — Medication 100 MILLIGRAM(S): at 23:02

## 2024-10-25 RX ADMIN — AMPICILLIN AND SULBACTAM 200 GRAM(S): 2; 1 INJECTION, POWDER, FOR SOLUTION INTRAMUSCULAR; INTRAVENOUS at 17:43

## 2024-10-25 RX ADMIN — Medication 975 MILLIGRAM(S): at 14:00

## 2024-10-25 RX ADMIN — Medication 100 MILLIGRAM(S): at 13:07

## 2024-10-25 RX ADMIN — Medication 975 MILLIGRAM(S): at 06:15

## 2024-10-25 RX ADMIN — AMPICILLIN AND SULBACTAM 200 GRAM(S): 2; 1 INJECTION, POWDER, FOR SOLUTION INTRAMUSCULAR; INTRAVENOUS at 06:12

## 2024-10-25 RX ADMIN — OXYCODONE HYDROCHLORIDE 5 MILLIGRAM(S): 30 TABLET ORAL at 04:00

## 2024-10-25 RX ADMIN — CHLORHEXIDINE GLUCONATE 15 MILLILITER(S): 40 SOLUTION TOPICAL at 17:43

## 2024-10-25 RX ADMIN — DEXAMETHASONE 1.5 MG 101.6 MILLIGRAM(S): 1.5 TABLET ORAL at 06:14

## 2024-10-25 RX ADMIN — Medication 40 MILLIGRAM(S): at 06:00

## 2024-10-25 RX ADMIN — Medication 4 MG/HR: at 07:58

## 2024-10-25 RX ADMIN — Medication 1 TABLET(S): at 13:03

## 2024-10-25 RX ADMIN — Medication 975 MILLIGRAM(S): at 23:05

## 2024-10-25 RX ADMIN — Medication 10 MILLIGRAM(S): at 07:58

## 2024-10-25 RX ADMIN — Medication 4 MG/HR: at 03:18

## 2024-10-25 RX ADMIN — Medication 975 MILLIGRAM(S): at 07:00

## 2024-10-25 RX ADMIN — Medication 975 MILLIGRAM(S): at 17:43

## 2024-10-25 RX ADMIN — Medication 975 MILLIGRAM(S): at 13:02

## 2024-10-25 RX ADMIN — PANTOPRAZOLE SODIUM 40 MILLIGRAM(S): 40 TABLET, DELAYED RELEASE ORAL at 17:42

## 2024-10-25 RX ADMIN — OXYCODONE HYDROCHLORIDE 5 MILLIGRAM(S): 30 TABLET ORAL at 03:17

## 2024-10-25 RX ADMIN — POLYETHYLENE GLYCOL 3350 17 GRAM(S): 17 POWDER, FOR SOLUTION ORAL at 13:02

## 2024-10-25 RX ADMIN — AMPICILLIN AND SULBACTAM 200 GRAM(S): 2; 1 INJECTION, POWDER, FOR SOLUTION INTRAMUSCULAR; INTRAVENOUS at 13:04

## 2024-10-25 RX ADMIN — Medication 975 MILLIGRAM(S): at 18:30

## 2024-10-25 RX ADMIN — HYDRALAZINE HYDROCHLORIDE 10 MILLIGRAM(S): 50 TABLET, FILM COATED ORAL at 20:25

## 2024-10-25 RX ADMIN — Medication 100 MILLILITER(S): at 07:58

## 2024-10-25 RX ADMIN — CHLORHEXIDINE GLUCONATE 15 MILLILITER(S): 40 SOLUTION TOPICAL at 06:16

## 2024-10-25 RX ADMIN — Medication 975 MILLIGRAM(S): at 00:00

## 2024-10-25 RX ADMIN — Medication 10 MILLIGRAM(S): at 20:04

## 2024-10-25 RX ADMIN — Medication 10 MILLIGRAM(S): at 17:42

## 2024-10-25 RX ADMIN — GABAPENTIN 600 MILLIGRAM(S): 300 CAPSULE ORAL at 13:03

## 2024-10-25 NOTE — PROGRESS NOTE ADULT - ASSESSMENT
Neuro  - n/v checks q1h  - cook doppler  - Tylenol, gabapentin, PRN oxy    Resp  - RA, NC PRN    CV  - SBP goal < 140 (cleviprex gtt)  - wean drip as tolerated     GI/Nutrition  - NPO/IVF  - KO tube in place    /Renal  - strict Is/Os  - lebron    Heme  - DVT PPX (Lovenox)    Endo  - monitor serum glucose  - decadron 8mg Q8h x124 hrs   - check TSH /ha1c level     ID  - unasyn 24h post-op    Lines  - a line  - lebron  - Ko tube  - IV  - JPx1  - vacx1    Dispo  - SICU     Neuro  - n/v checks q1h  - cook doppler  - Tylenol, gabapentin, PRN oxy    Resp  - RA, NC PRN    CV  - SBP goal < 140 (cleviprex gtt)  - wean drip as tolerated   - consider baroreflex dysfunction given acute episode post op neck dissection    GI/Nutrition  - NPO/IVF  - KO tube in place    /Renal  - strict Is/Os  - lebron    Heme  - DVT PPX (Lovenox)    Endo  - monitor serum glucose  - decadron 8mg Q8h x124 hrs   - check TSH /ha1c level     ID  - unasyn 24h post-op    Lines  - a line  - lebron  - Ko tube  - IV  - JPx1  - vacx1    Dispo  - SICU     Neuro  - n/v checks q1h  - cook doppler  - Tylenol, gabapentin, PRN oxy    Resp  - RA, NC PRN    CV  - SBP goal < 140 (cleviprex gtt)  - wean drip as tolerated   - >?baroreflex dysfunction given acute episode of HTN post op neck dissection    GI/Nutrition  - NPO/IVF  - KO tube in place    /Renal  - strict Is/Os  - lebron    Heme  - DVT PPX (Lovenox)    Endo  - monitor serum glucose  - decadron 8mg Q8h x124 hrs   - check TSH /ha1c level     ID  - unasyn 24h post-op    Lines  - a line  - lebron  - Ko tube  - IV  - JPx1  - vacx1    Dispo  - SICU     Neuro  - n/v checks q1h  - cook doppler  - Tylenol, gabapentin, PRN oxy    Resp  - RA, NC PRN  - incentive spirometer 10x/hr    CV  - SBP goal < 140 (cleviprex gtt)  - wean drip as tolerated   - >?baroreflex dysfunction given acute episode of HTN post op neck dissection    GI/Nutrition  - NPO/IVF  - GI ppx with protonix  - KO tube in place , feeds to start POD#1    /Renal  - strict Is/Os  - MARK lebron in AM     Heme  - DVT PPX (Lovenox)    Endo  - monitor serum glucose  - decadron 8mg Q8h x124 hrs   - check TSH /ha1c level     ID  - unasyn 24h post-op    Lines  - a line  - lebron  - Ko tube  - IV  - JPx1  - vacx1    Dispo  - SICU     Neuro  - n/v checks q1h  - cook doppler  - Tylenol, gabapentin, PRN oxy    Resp  - RA, NC PRN  - incentive spirometer 10x/hr    CV  - post op hypertension  - SBP goal < 140 (cleviprex gtt +/- IVP labetalol )  - wean drip as tolerated    - >?baroreflex dysfunction given acute episode of HTN post op neck dissection    GI/Nutrition  - NPO/IVF  - GI ppx with protonix  - KO tube in place , feeds to start POD#1    /Renal  - strict Is/Os  - MARK lebron in AM     Heme  - DVT PPX (Lovenox)    Endo  - monitor serum glucose  - decadron 8mg Q8h x124 hrs   - check TSH /ha1c level     ID  - unasyn 24h post-op    Lines  - a line  - lebron  - Ko tube  - IV  - JPx1  - vacx1    Dispo  - SICU

## 2024-10-25 NOTE — PROGRESS NOTE ADULT - SUBJECTIVE AND OBJECTIVE BOX
Plastic Surgery Progress Note (pg [facility]: [id number], [facility]: [phone])    SUBJECTIVE  The patient was seen and examined. No acute events overnight. Pain controlled, afebrile w/ stable vitals.     OBJECTIVE  ___________________________________________________  VITAL SIGNS / I&O's   Vital Signs Last 24 Hrs  T(C): 36.2 (25 Oct 2024 04:00), Max: 36.4 (24 Oct 2024 17:30)  T(F): 97.2 (25 Oct 2024 04:00), Max: 97.5 (24 Oct 2024 17:30)  HR: 68 (25 Oct 2024 07:00) (58 - 108)  BP: 122/60 (25 Oct 2024 04:00) (117/55 - 158/75)  BP(mean): 77 (25 Oct 2024 04:00) (73 - 117)  RR: 14 (25 Oct 2024 07:00) (11 - 18)  SpO2: 93% (25 Oct 2024 07:00) (93% - 100%)    Parameters below as of 24 Oct 2024 22:00  Patient On (Oxygen Delivery Method): nasal cannula  O2 Flow (L/min): 2        24 Oct 2024 07:01  -  25 Oct 2024 07:00  --------------------------------------------------------  IN:    Clevidipine: 480 mL    Free Water: 70 mL    IV PiggyBack: 601 mL    Lactated Ringers: 1400 mL  Total IN: 2551 mL    OUT:    Drain (mL): 120 mL    Indwelling Catheter - Urethral (mL): 1260 mL    VAC (Vacuum Assisted Closure) System (mL): 50 mL  Total OUT: 1430 mL    Total NET: 1121 mL        ___________________________________________________  PHYSICAL EXAM    -- CONSTITUTIONAL: NAD, lying in bed  -- NEURO: Awake, alert  -- HEENT: NC/AT, mucous membranes moist. NGT. Intraoral flap viable appearing w/ good color and temperature, small area central bruising. Cook signal strong and triphasic. ACACIA drain w/ SS output  -- NECK: Soft, no collections, neck incision intact  -- ABDOMEN: Nondistended  -- EXTREMITIES: L arm wound vac in place intact to suction.  -- PSYCH: Affect normal, A&Ox3      ___________________________________________________  LABS                        10.9   14.40 )-----------( 174      ( 25 Oct 2024 00:20 )             33.2     25 Oct 2024 00:20    141    |  102    |  13     ----------------------------<  179    4.1     |  25     |  0.80     Ca    8.6        25 Oct 2024 00:20  Phos  4.2       24 Oct 2024 18:24  Mg     1.70      24 Oct 2024 18:24        CAPILLARY BLOOD GLUCOSE            Urinalysis Basic - ( 25 Oct 2024 00:20 )    Color: x / Appearance: x / SG: x / pH: x  Gluc: 179 mg/dL / Ketone: x  / Bili: x / Urobili: x   Blood: x / Protein: x / Nitrite: x   Leuk Esterase: x / RBC: x / WBC x   Sq Epi: x / Non Sq Epi: x / Bacteria: x      ___________________________________________________  MICRO  Recent Cultures:    ___________________________________________________  MEDICATIONS  (STANDING):  acetaminophen   Oral Liquid .. 975 milliGRAM(s) Oral every 6 hours  ampicillin/sulbactam  IVPB      ampicillin/sulbactam  IVPB 3 Gram(s) IV Intermittent every 6 hours  chlorhexidine 0.12% Liquid 15 milliLiter(s) Oral Mucosa two times a day  clevidipine Infusion 2 mG/Hr (4 mL/Hr) IV Continuous <Continuous>  dexAMETHasone  IVPB 8 milliGRAM(s) IV Intermittent every 8 hours  enoxaparin Injectable 40 milliGRAM(s) SubCutaneous every 24 hours  gabapentin 600 milliGRAM(s) Oral daily  lactated ringers. 1000 milliLiter(s) (100 mL/Hr) IV Continuous <Continuous>  pantoprazole  Injectable 40 milliGRAM(s) IV Push every 24 hours  polyethylene glycol 3350 17 Gram(s) Oral daily  senna 1 Tablet(s) Oral daily    MEDICATIONS  (PRN):  labetalol Injectable 10 milliGRAM(s) IV Push every 6 hours PRN Systolic blood pressure >140  ondansetron Injectable 4 milliGRAM(s) IV Push every 8 hours PRN Nausea and/or Vomiting  oxyCODONE    Solution 5 milliGRAM(s) Oral every 6 hours PRN Severe Pain (7 - 10)  oxyCODONE    Solution 2.5 milliGRAM(s) Oral every 6 hours PRN Moderate Pain (4 - 6)

## 2024-10-25 NOTE — PROGRESS NOTE ADULT - ASSESSMENT
Assessment and Plan:  AUSTYN LINTON is a  73yMale w/ SCC of left gingivolabial sulcus s/p L SND 1-3, excision of gingival lesion and marginal mandibulectomy, L RFFF w/ STSG from L thigh, transferred to SICU for flap monitoring.    PLAN:  - Pain meds (Standing tylenol 975mg q6 hrs or 650mg q6 if <60kg, gabapentin 600mg qd if age <70 and no diuretic rx, opioids as needed)  - Flap checks  q1x24, q2x24, q4x72  - Baci 2x/d to neck incisions  - Lovenox starting POD1 or Heparin 5000units TID if GFR <30   - Feeds POD1  - OOB POD1  - dc lebron POD1  - Unasyn x24h  - Decadron 8mg x3 doses  - Peridex swish and spit BID  - Esomeprazole 20 mg BID or pantoprazole 40 QD  - Bowel regimen (senna + miralax)  - Incentive spirometry  - f/u HbA1c and TSH    Page ENT with any questions/concerns.  Peds Page #[phone]  Adult Page #[phone]    Mag Le  10-24-24 @ 20:46

## 2024-10-25 NOTE — PROGRESS NOTE ADULT - SUBJECTIVE AND OBJECTIVE BOX
Interval/Overnight Events  - left neck dissections level 1-3 and excision of lesion of left gingovobuccal sulcus with sacrifice of mental nerve, marginal mandibulectomy with reconstruction using RFFF  - vac over RFFF, STSG from L thigh    -   - remains on high dose cleviprex , req labetalol x 1 o/n ?? baroreflex dysfuction given recent neck dissection ? Per family pt w/o siginificant hx of HTN    Interval/Overnight Events  - left neck dissections level 1-3 and excision of lesion of left gingovobuccal sulcus with sacrifice of mental nerve, marginal mandibulectomy with reconstruction using RFFF  - vac over RFFF, STSG from L thigh    - EBL 300cc  - remains on high dose cleviprex , req labetalol x 1 o/n ?? 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      ALLERGIES:  latex (Rash)  No Known Drug Allergies    -------------------------------------------------------------------------------------  Neurologic Medications  acetaminophen   Oral Liquid .. 975 milliGRAM(s) Oral every 6 hours  gabapentin 600 milliGRAM(s) Oral daily  ondansetron Injectable 4 milliGRAM(s) IV Push every 8 hours PRN Nausea and/or Vomiting  oxyCODONE    Solution 5 milliGRAM(s) Oral every 6 hours PRN Severe Pain (7 - 10)  oxyCODONE    Solution 2.5 milliGRAM(s) Oral every 6 hours PRN Moderate Pain (4 - 6)    Respiratory Medications    Cardiovascular Medications  clevidipine Infusion 2 mG/Hr IV Continuous <Continuous>    Gastrointestinal Medications  lactated ringers. 1000 milliLiter(s) IV Continuous <Continuous>  pantoprazole  Injectable 40 milliGRAM(s) IV Push every 24 hours  polyethylene glycol 3350 17 Gram(s) Oral daily  senna 1 Tablet(s) Oral daily    Genitourinary Medications    Hematologic/Oncologic Medications  enoxaparin Injectable 40 milliGRAM(s) SubCutaneous every 24 hours    Antimicrobial/Immunologic Medications  ampicillin/sulbactam  IVPB      ampicillin/sulbactam  IVPB 3 Gram(s) IV Intermittent every 6 hours    Endocrine/Metabolic Medications  dexAMETHasone  IVPB 8 milliGRAM(s) IV Intermittent every 8 hours    Topical/Other Medications  chlorhexidine 0.12% Liquid 15 milliLiter(s) Oral Mucosa two times a day      --------------------------------------------------------------------------------------    VITAL SIGNS:  T(C): 36.4 (10-24-24 @ 20:00), Max: 36.6 (10-24-24 @ 06:08)  HR: 64 (10-25-24 @ 00:00) (58 - 108)  BP: 117/55 (10-25-24 @ 00:00) (117/55 - 158/75)  RR: 12 (10-25-24 @ 00:00) (11 - 17)  SpO2: 98% (10-25-24 @ 00:00) (93% - 100%)  --------------------------------------------------------------------------------------    INS AND OUTS:    I&O's Detail    24 Oct 2024 07:01  -  25 Oct 2024 00:57  --------------------------------------------------------  IN:    Clevidipine: 288 mL    Free Water: 50 mL    IV PiggyBack: 451 mL    Lactated Ringers: 800 mL  Total IN: 1589 mL    OUT:    Drain (mL): 70 mL    Indwelling Catheter - Urethral (mL): 590 mL  Total OUT: 660 mL    Total NET: 929 mL            --------------------------------------------------------------------------------------   Interval/Overnight Events  - left neck dissections level 1-3 and excision of lesion of left gingovobuccal sulcus with sacrifice of mental nerve, marginal mandibulectomy with reconstruction using RFFF  - vac over RFFF, STSG from L thigh    - EBL 300cc  - remains on high dose cleviprex gtt req labetalol x 1 o/n ?? 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      ALLERGIES:  latex (Rash)  No Known Drug Allergies    -------------------------------------------------------------------------------------  Neurologic Medications  acetaminophen   Oral Liquid .. 975 milliGRAM(s) Oral every 6 hours  gabapentin 600 milliGRAM(s) Oral daily  ondansetron Injectable 4 milliGRAM(s) IV Push every 8 hours PRN Nausea and/or Vomiting  oxyCODONE    Solution 5 milliGRAM(s) Oral every 6 hours PRN Severe Pain (7 - 10)  oxyCODONE    Solution 2.5 milliGRAM(s) Oral every 6 hours PRN Moderate Pain (4 - 6)    Respiratory Medications    Cardiovascular Medications  clevidipine Infusion 2 mG/Hr IV Continuous <Continuous>    Gastrointestinal Medications  lactated ringers. 1000 milliLiter(s) IV Continuous <Continuous>  pantoprazole  Injectable 40 milliGRAM(s) IV Push every 24 hours  polyethylene glycol 3350 17 Gram(s) Oral daily  senna 1 Tablet(s) Oral daily    Genitourinary Medications    Hematologic/Oncologic Medications  enoxaparin Injectable 40 milliGRAM(s) SubCutaneous every 24 hours    Antimicrobial/Immunologic Medications  ampicillin/sulbactam  IVPB      ampicillin/sulbactam  IVPB 3 Gram(s) IV Intermittent every 6 hours    Endocrine/Metabolic Medications  dexAMETHasone  IVPB 8 milliGRAM(s) IV Intermittent every 8 hours    Topical/Other Medications  chlorhexidine 0.12% Liquid 15 milliLiter(s) Oral Mucosa two times a day      --------------------------------------------------------------------------------------    VITAL SIGNS:  T(C): 36.4 (10-24-24 @ 20:00), Max: 36.6 (10-24-24 @ 06:08)  HR: 64 (10-25-24 @ 00:00) (58 - 108)  BP: 117/55 (10-25-24 @ 00:00) (117/55 - 158/75)  RR: 12 (10-25-24 @ 00:00) (11 - 17)  SpO2: 98% (10-25-24 @ 00:00) (93% - 100%)  --------------------------------------------------------------------------------------    INS AND OUTS:    I&O's Detail    24 Oct 2024 07:01  -  25 Oct 2024 00:57  --------------------------------------------------------  IN:    Clevidipine: 288 mL    Free Water: 50 mL    IV PiggyBack: 451 mL    Lactated Ringers: 800 mL  Total IN: 1589 mL    OUT:    Drain (mL): 70 mL    Indwelling Catheter - Urethral (mL): 590 mL  Total OUT: 660 mL    Total NET: 929 mL            --------------------------------------------------------------------------------------   Interval/Overnight Events  - left neck dissections level 1-3 and excision of lesion of left gingovobuccal sulcus with sacrifice of mental nerve, marginal mandibulectomy with reconstruction using RFFF  - vac over RFFF, STSG from L thigh    - EBL 300cc  - remains on high dose cleviprex gtt req labetalol x 1 o/n ?? 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      ALLERGIES:  latex (Rash)  No Known Drug Allergies    -------------------------------------------------------------------------------------  Neurologic Medications  acetaminophen   Oral Liquid .. 975 milliGRAM(s) Oral every 6 hours  gabapentin 600 milliGRAM(s) Oral daily  ondansetron Injectable 4 milliGRAM(s) IV Push every 8 hours PRN Nausea and/or Vomiting  oxyCODONE    Solution 5 milliGRAM(s) Oral every 6 hours PRN Severe Pain (7 - 10)  oxyCODONE    Solution 2.5 milliGRAM(s) Oral every 6 hours PRN Moderate Pain (4 - 6)    Respiratory Medications    Cardiovascular Medications  clevidipine Infusion 2 mG/Hr IV Continuous <Continuous>    Gastrointestinal Medications  lactated ringers. 1000 milliLiter(s) IV Continuous <Continuous>  pantoprazole  Injectable 40 milliGRAM(s) IV Push every 24 hours  polyethylene glycol 3350 17 Gram(s) Oral daily  senna 1 Tablet(s) Oral daily    Genitourinary Medications    Hematologic/Oncologic Medications  enoxaparin Injectable 40 milliGRAM(s) SubCutaneous every 24 hours    Antimicrobial/Immunologic Medications  ampicillin/sulbactam  IVPB      ampicillin/sulbactam  IVPB 3 Gram(s) IV Intermittent every 6 hours    Endocrine/Metabolic Medications  dexAMETHasone  IVPB 8 milliGRAM(s) IV Intermittent every 8 hours    Topical/Other Medications  chlorhexidine 0.12% Liquid 15 milliLiter(s) Oral Mucosa two times a day      --------------------------------------------------------------------------------------    VITAL SIGNS:  T(C): 36.4 (10-24-24 @ 20:00), Max: 36.6 (10-24-24 @ 06:08)  HR: 64 (10-25-24 @ 00:00) (58 - 108)  BP: 117/55 (10-25-24 @ 00:00) (117/55 - 158/75)  RR: 12 (10-25-24 @ 00:00) (11 - 17)  SpO2: 98% (10-25-24 @ 00:00) (93% - 100%)  --------------------------------------------------------------------------------------    INS AND OUTS:    I&O's Detail    24 Oct 2024 07:01  -  25 Oct 2024 00:57  --------------------------------------------------------  IN:    Clevidipine: 288 mL    Free Water: 50 mL    IV PiggyBack: 451 mL    Lactated Ringers: 800 mL  Total IN: 1589 mL    OUT:    Drain (mL): 70 mL    Indwelling Catheter - Urethral (mL): 590 mL  Total OUT: 660 mL    Total NET: 929 mL            --------------------------------------------------------------------------------------  Physical Exam     NAD, resting comfortably in bed   Neck flat and supple, no collection. Incision closed with staples c/d/i  ACACIA L neck w/ SS output  Cook doppler with strong arterial signal. Intraoral skin paddle pink, well perfused. Incisions intact.  OC/OP: no erythema, bleeding, lacerations  Donor site covered w/ ACE bandage, wound vac in place      LABS                                              10.9                  Neurophils% (auto):   x      (10-25 @ 00:20):    14.40)-----------(174          Lymphocytes% (auto):  x                                             33.2                   Eosinphils% (auto):   x        Manual%: Neutrophils x    ; Lymphocytes x    ; Eosinophils x    ; Bands%: x    ; Blasts x                                    139    |  100    |  16                  Calcium: 8.2   / iCa: x      (10-24 @ 18:24)    ----------------------------<  214       Magnesium: 1.70                             4.0     |  23     |  0.82             Phosphorous: 4.2            Urinalysis Basic - ( 24 Oct 2024 18:24 )    Color: x / Appearance: x / SG: x / pH: x  Gluc: 214 mg/dL / Ketone: x  / Bili: x / Urobili: x   Blood: x / Protein: x / Nitrite: x   Leuk Esterase: x / RBC: x / WBC x   Sq Epi: x / Non Sq Epi: x / Bacteria: x      ABO Interpretation: A (24 Oct 2024 06:22)  ABO Interpretation: A (24 Oct 2024 06:16)     Interval/Overnight Events  - left neck dissections level 1-3 and excision of lesion of left gingovobuccal sulcus with sacrifice of mental nerve, marginal mandibulectomy with reconstruction using RFFF  - vac over RFFF, STSG from L thigh    - EBL 300cc  - remains on high dose cleviprex gtt req labetalol x 1 o/n ?? 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      ALLERGIES:  latex (Rash)  No Known Drug Allergies    -------------------------------------------------------------------------------------  Neurologic Medications  acetaminophen   Oral Liquid .. 975 milliGRAM(s) Oral every 6 hours  gabapentin 600 milliGRAM(s) Oral daily  ondansetron Injectable 4 milliGRAM(s) IV Push every 8 hours PRN Nausea and/or Vomiting  oxyCODONE    Solution 5 milliGRAM(s) Oral every 6 hours PRN Severe Pain (7 - 10)  oxyCODONE    Solution 2.5 milliGRAM(s) Oral every 6 hours PRN Moderate Pain (4 - 6)    Respiratory Medications    Cardiovascular Medications  clevidipine Infusion 2 mG/Hr IV Continuous <Continuous>  labetalol Injectable 10 milliGRAM(s) IV Push every 6 hours PRN Systolic blood pressure >140    Gastrointestinal Medications  lactated ringers. 1000 milliLiter(s) IV Continuous <Continuous>  pantoprazole  Injectable 40 milliGRAM(s) IV Push every 24 hours  polyethylene glycol 3350 17 Gram(s) Oral daily  senna 1 Tablet(s) Oral daily    Genitourinary Medications    Hematologic/Oncologic Medications  enoxaparin Injectable 40 milliGRAM(s) SubCutaneous every 24 hours    Antimicrobial/Immunologic Medications  ampicillin/sulbactam  IVPB 3 Gram(s) IV Intermittent every 6 hours  ampicillin/sulbactam  IVPB        Endocrine/Metabolic Medications  dexAMETHasone  IVPB 8 milliGRAM(s) IV Intermittent every 8 hours    Topical/Other Medications  chlorhexidine 0.12% Liquid 15 milliLiter(s) Oral Mucosa two times a day      --------------------------------------------------------------------------------------    VITAL SIGNS:  T(C): 36.4 (10-24-24 @ 20:00), Max: 36.6 (10-24-24 @ 06:08)  HR: 64 (10-25-24 @ 00:00) (58 - 108)  BP: 117/55 (10-25-24 @ 00:00) (117/55 - 158/75)  RR: 12 (10-25-24 @ 00:00) (11 - 17)  SpO2: 98% (10-25-24 @ 00:00) (93% - 100%)  --------------------------------------------------------------------------------------    INS AND OUTS:    I&O's Detail    24 Oct 2024 07:01  -  25 Oct 2024 00:57  --------------------------------------------------------  IN:    Clevidipine: 288 mL    Free Water: 50 mL    IV PiggyBack: 451 mL    Lactated Ringers: 800 mL  Total IN: 1589 mL    OUT:    Drain (mL): 70 mL    Indwelling Catheter - Urethral (mL): 590 mL  Total OUT: 660 mL    Total NET: 929 mL            --------------------------------------------------------------------------------------  Physical Exam     NAD, resting comfortably in bed   Neck flat and supple, no collection. Incision closed with staples c/d/i  ACACIA L neck w/ SS output  Cook doppler with strong arterial signal. Intraoral skin paddle pink, well perfused. Incisions intact.  OC/OP: no erythema, bleeding, lacerations  Donor site covered w/ ACE bandage, wound vac in place      LABS                                              10.9                  Neurophils% (auto):   x      (10-25 @ 00:20):    14.40)-----------(174          Lymphocytes% (auto):  x                                             33.2                   Eosinphils% (auto):   x        Manual%: Neutrophils x    ; Lymphocytes x    ; Eosinophils x    ; Bands%: x    ; Blasts x                                    139    |  100    |  16                  Calcium: 8.2   / iCa: x      (10-24 @ 18:24)    ----------------------------<  214       Magnesium: 1.70                             4.0     |  23     |  0.82             Phosphorous: 4.2            Urinalysis Basic - ( 24 Oct 2024 18:24 )    Color: x / Appearance: x / SG: x / pH: x  Gluc: 214 mg/dL / Ketone: x  / Bili: x / Urobili: x   Blood: x / Protein: x / Nitrite: x   Leuk Esterase: x / RBC: x / WBC x   Sq Epi: x / Non Sq Epi: x / Bacteria: x      ABO Interpretation: A (24 Oct 2024 06:22)  ABO Interpretation: A (24 Oct 2024 06:16)     Interval/Overnight Events  - POD#1 from left neck dissections level 1-3 and excision of lesion of left gingovobuccal sulcus with sacrifice of mental nerve, marginal mandibulectomy with reconstruction using RFFF  - vac over RFFF, STSG from L thigh    - EBL 300cc  - remains on high dose cleviprex gtt req labetalol x 1 o/n ?? 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      ALLERGIES:  latex (Rash)  No Known Drug Allergies    -------------------------------------------------------------------------------------  Neurologic Medications  acetaminophen   Oral Liquid .. 975 milliGRAM(s) Oral every 6 hours  gabapentin 600 milliGRAM(s) Oral daily  ondansetron Injectable 4 milliGRAM(s) IV Push every 8 hours PRN Nausea and/or Vomiting  oxyCODONE    Solution 5 milliGRAM(s) Oral every 6 hours PRN Severe Pain (7 - 10)  oxyCODONE    Solution 2.5 milliGRAM(s) Oral every 6 hours PRN Moderate Pain (4 - 6)    Respiratory Medications    Cardiovascular Medications  clevidipine Infusion 2 mG/Hr IV Continuous <Continuous>  labetalol Injectable 10 milliGRAM(s) IV Push every 6 hours PRN Systolic blood pressure >140    Gastrointestinal Medications  lactated ringers. 1000 milliLiter(s) IV Continuous <Continuous>  pantoprazole  Injectable 40 milliGRAM(s) IV Push every 24 hours  polyethylene glycol 3350 17 Gram(s) Oral daily  senna 1 Tablet(s) Oral daily    Genitourinary Medications    Hematologic/Oncologic Medications  enoxaparin Injectable 40 milliGRAM(s) SubCutaneous every 24 hours    Antimicrobial/Immunologic Medications  ampicillin/sulbactam  IVPB 3 Gram(s) IV Intermittent every 6 hours  ampicillin/sulbactam  IVPB        Endocrine/Metabolic Medications  dexAMETHasone  IVPB 8 milliGRAM(s) IV Intermittent every 8 hours    Topical/Other Medications  chlorhexidine 0.12% Liquid 15 milliLiter(s) Oral Mucosa two times a day      --------------------------------------------------------------------------------------    VITAL SIGNS:  T(C): 36.4 (10-24-24 @ 20:00), Max: 36.6 (10-24-24 @ 06:08)  HR: 64 (10-25-24 @ 00:00) (58 - 108)  BP: 117/55 (10-25-24 @ 00:00) (117/55 - 158/75)  RR: 12 (10-25-24 @ 00:00) (11 - 17)  SpO2: 98% (10-25-24 @ 00:00) (93% - 100%)  --------------------------------------------------------------------------------------    INS AND OUTS:    I&O's Detail    24 Oct 2024 07:01  -  25 Oct 2024 00:57  --------------------------------------------------------  IN:    Clevidipine: 288 mL    Free Water: 50 mL    IV PiggyBack: 451 mL    Lactated Ringers: 800 mL  Total IN: 1589 mL    OUT:    Drain (mL): 70 mL    Indwelling Catheter - Urethral (mL): 590 mL  Total OUT: 660 mL    Total NET: 929 mL            --------------------------------------------------------------------------------------  Physical Exam     NAD, resting comfortably in bed   Neck flat and supple, no collection. Incision closed with staples c/d/i  ACACIA L neck w/ SS output  Cook doppler with strong arterial signal. Intraoral skin paddle pink, well perfused. Incisions intact.  OC/OP: no erythema, bleeding, lacerations  Donor site covered w/ ACE bandage, wound vac in place      LABS                                              10.9                  Neurophils% (auto):   x      (10-25 @ 00:20):    14.40)-----------(174          Lymphocytes% (auto):  x                                             33.2                   Eosinphils% (auto):   x        Manual%: Neutrophils x    ; Lymphocytes x    ; Eosinophils x    ; Bands%: x    ; Blasts x                                    139    |  100    |  16                  Calcium: 8.2   / iCa: x      (10-24 @ 18:24)    ----------------------------<  214       Magnesium: 1.70                             4.0     |  23     |  0.82             Phosphorous: 4.2            Urinalysis Basic - ( 24 Oct 2024 18:24 )    Color: x / Appearance: x / SG: x / pH: x  Gluc: 214 mg/dL / Ketone: x  / Bili: x / Urobili: x   Blood: x / Protein: x / Nitrite: x   Leuk Esterase: x / RBC: x / WBC x   Sq Epi: x / Non Sq Epi: x / Bacteria: x      ABO Interpretation: A (24 Oct 2024 06:22)  ABO Interpretation: A (24 Oct 2024 06:16)

## 2024-10-25 NOTE — PROGRESS NOTE ADULT - ASSESSMENT
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    - Q1h flap checks with [employer] doppler, monitor flap color  - Monitor ACACIA drain output  - Monitor wound vac output, will take down on POD7  - Skin graft donor site dressing down on POD7, reinforce PRN  - under [facility]AS protocol care  - Pain control  - Appreciate SICU care    Plastic Surgery   [facility]: [id number]  [facility]: [phone]

## 2024-10-25 NOTE — PROGRESS NOTE ADULT - SUBJECTIVE AND OBJECTIVE BOX
POST ANESTHESIA EVALUATION    73y Male POSTOP DAY 1 S/P     MENTAL STATUS: Patient participation [x  ] Awake     [  ] Arousable     [  ] Sedated    AIRWAY PATENCY: [x  ] Satisfactory  [  ] Other:     Vital Signs Last 24 Hrs  T(C): 35.8 (25 Oct 2024 08:00), Max: 36.4 (24 Oct 2024 17:30)  T(F): 96.5 (25 Oct 2024 08:00), Max: 97.5 (24 Oct 2024 17:30)  HR: 70 (25 Oct 2024 10:00) (58 - 108)  BP: 119/62 (25 Oct 2024 08:00) (117/55 - 158/75)  BP(mean): 77 (25 Oct 2024 08:00) (73 - 117)  RR: 21 (25 Oct 2024 10:00) (11 - 21)  SpO2: 93% (25 Oct 2024 10:00) (91% - 100%)    Parameters below as of 25 Oct 2024 08:00  Patient On (Oxygen Delivery Method): room air      I&O's Summary    24 Oct 2024 07:01  -  25 Oct 2024 07:00  --------------------------------------------------------  IN: 2551 mL / OUT: 1430 mL / NET: 1121 mL    25 Oct 2024 07:01  -  25 Oct 2024 10:39  --------------------------------------------------------  IN: 280 mL / OUT: 20 mL / NET: 260 mL          NAUSEA/ VOMITTING:  [ x ] NONE  [  ] CONTROLLED [  ] OTHER     PAIN: [x  ] CONTROLLED WITH CURRENT REGIMEN  [  ] OTHER    [ x ] NO APPARENT ANESTHESIA COMPLICATIONS      Comments:

## 2024-10-25 NOTE — PROGRESS NOTE ADULT - ASSESSMENT
73 M POD1 s/p Left SND, excision of gingival lesion and left marginal mandibulectomy, L RFFF w/ STSG from L thigh. Pt progressing well      - Q1h flap checks with [employer] doppler, monitor flap color  - Pain control  - Appreciate SICU care    Ibeth Lawrence  Oral & Maxillofacial Surgery   Pager #[phone]  Available on Teams

## 2024-10-25 NOTE — PROGRESS NOTE ADULT - SUBJECTIVE AND OBJECTIVE BOX
OTOLARYNGOLOGY (ENT) PROGRESS NOTE    PATIENT: AUSTYN LINTON  MRN: [id number]  : 48  [id number]  DATE OF SERVICE:  10-25-24  			           Subjective/ Interval: Patient seen and examined at bedside in the SICU. Transferred from OR to SICU w/o complications. NGT confirmed in place on CXR.   10/25: Patient seen and examined at bedside in SICU. Required labetalol x10mg overnight and cleviprex drip to control BP        MEDICATIONS  (STANDING):  acetaminophen   Oral Liquid .. 975 milliGRAM(s) Oral every 6 hours  ampicillin/sulbactam  IVPB      ampicillin/sulbactam  IVPB 3 Gram(s) IV Intermittent every 6 hours  chlorhexidine 0.12% Liquid 15 milliLiter(s) Oral Mucosa two times a day  clevidipine Infusion 2 mG/Hr (4 mL/Hr) IV Continuous <Continuous>  dexAMETHasone  IVPB 8 milliGRAM(s) IV Intermittent every 8 hours  enoxaparin Injectable 40 milliGRAM(s) SubCutaneous every 24 hours  gabapentin 600 milliGRAM(s) Oral daily  lactated ringers. 1000 milliLiter(s) (100 mL/Hr) IV Continuous <Continuous>  pantoprazole  Injectable 40 milliGRAM(s) IV Push every 24 hours  polyethylene glycol 3350 17 Gram(s) Oral daily  senna 1 Tablet(s) Oral daily    MEDICATIONS  (PRN):  labetalol Injectable 10 milliGRAM(s) IV Push every 6 hours PRN Systolic blood pressure >140  ondansetron Injectable 4 milliGRAM(s) IV Push every 8 hours PRN Nausea and/or Vomiting  oxyCODONE    Solution 2.5 milliGRAM(s) Oral every 6 hours PRN Moderate Pain (4 - 6)  oxyCODONE    Solution 5 milliGRAM(s) Oral every 6 hours PRN Severe Pain (7 - 10)      Vital Signs Last 24 Hrs  T(C): 36.1 (25 Oct 2024 00:00), Max: 36.6 (24 Oct 2024 06:08)  T(F): 97 (25 Oct 2024 00:00), Max: 97.9 (24 Oct 2024 06:08)  HR: 70 (25 Oct 2024 03:00) (58 - 108)  BP: 117/55 (25 Oct 2024 00:00) (117/55 - 158/75)  BP(mean): 73 (25 Oct 2024 00:00) (73 - 117)  RR: 11 (25 Oct 2024 03:00) (11 - 17)  SpO2: 94% (25 Oct 2024 03:00) (93% - 100%)    Parameters below as of 24 Oct 2024 22:00  Patient On (Oxygen Delivery Method): nasal cannula  O2 Flow (L/min): 2        NAD, resting comfortably in bed   Neck flat and supple, no collection. Incision closed with staples c/d/i  ACACIA L neck w/ SS output  Cook doppler with strong arterial signal. Intraoral skin paddle pink, well perfused. Incisions intact.  OC/OP: no erythema, bleeding, lacerations  Donor site covered w/ ACE bandage, wound vac in place

## 2024-10-25 NOTE — PROGRESS NOTE ADULT - SUBJECTIVE AND OBJECTIVE BOX
73 M POD1 s/p Left SND, excision of gingival lesion and left marginal mandibulectomy, L RFFF w/ STSG from L thigh.     Interval: HTN overnight, started on labetolol and cleviprex    PHYSICAL EXAM    -- CONSTITUTIONAL: NAD, lying in bed  -- NEURO: Awake, alert  -- HEENT: NC/AT, mucous membranes moist. NGT. Intraoral flap viable appearing w/ good color and temperature, small area central bruising. Cook signal strong and triphasic. ACACIA drain w/ SS output  -- NECK: Soft, no collections, neck incision intact  -- ABDOMEN: Nondistended  -- EXTREMITIES: L arm wound vac in place intact to suction.  -- PSYCH: Affect normal, A&Ox3    Vitals:     Vital Signs Last 24 Hrs  T(C): 36.2 (25 Oct 2024 04:00), Max: 36.4 (24 Oct 2024 17:30)  T(F): 97.2 (25 Oct 2024 04:00), Max: 97.5 (24 Oct 2024 17:30)  HR: 68 (25 Oct 2024 07:00) (58 - 108)  BP: 122/60 (25 Oct 2024 04:00) (117/55 - 158/75)  BP(mean): 77 (25 Oct 2024 04:00) (73 - 117)  RR: 14 (25 Oct 2024 07:00) (11 - 18)  SpO2: 93% (25 Oct 2024 07:00) (93% - 100%)    Parameters below as of 24 Oct 2024 22:00  Patient On (Oxygen Delivery Method): nasal cannula  O2 Flow (L/min): 2      I&O's Detail    24 Oct 2024 07:01  -  25 Oct 2024 07:00  --------------------------------------------------------  IN:    Clevidipine: 480 mL    Free Water: 70 mL    IV PiggyBack: 601 mL    Lactated Ringers: 1400 mL  Total IN: 2551 mL    OUT:    Drain (mL): 120 mL    Indwelling Catheter - Urethral (mL): 1260 mL    VAC (Vacuum Assisted Closure) System (mL): 50 mL  Total OUT: 1430 mL    Total NET: 1121 mL          Medications:    MEDICATIONS  (STANDING):  acetaminophen   Oral Liquid .. 975 milliGRAM(s) Oral every 6 hours  ampicillin/sulbactam  IVPB      ampicillin/sulbactam  IVPB 3 Gram(s) IV Intermittent every 6 hours  chlorhexidine 0.12% Liquid 15 milliLiter(s) Oral Mucosa two times a day  clevidipine Infusion 2 mG/Hr (4 mL/Hr) IV Continuous <Continuous>  dexAMETHasone  IVPB 8 milliGRAM(s) IV Intermittent every 8 hours  enoxaparin Injectable 40 milliGRAM(s) SubCutaneous every 24 hours  gabapentin 600 milliGRAM(s) Oral daily  lactated ringers. 1000 milliLiter(s) (100 mL/Hr) IV Continuous <Continuous>  pantoprazole  Injectable 40 milliGRAM(s) IV Push every 24 hours  polyethylene glycol 3350 17 Gram(s) Oral daily  senna 1 Tablet(s) Oral daily    MEDICATIONS  (PRN):  labetalol Injectable 10 milliGRAM(s) IV Push every 6 hours PRN Systolic blood pressure >140  ondansetron Injectable 4 milliGRAM(s) IV Push every 8 hours PRN Nausea and/or Vomiting  oxyCODONE    Solution 5 milliGRAM(s) Oral every 6 hours PRN Severe Pain (7 - 10)  oxyCODONE    Solution 2.5 milliGRAM(s) Oral every 6 hours PRN Moderate Pain (4 - 6)      Labs:                          10.9   14.40 )-----------( 174      ( 25 Oct 2024 00:20 )             33.2       10-25    141  |  102  |  13  ----------------------------<  179[H]  4.1   |  25  |  0.80    Ca    8.6      25 Oct 2024 00:20  Phos  4.2     10-24  Mg     1.70     10-24

## 2024-10-26 LAB
ANION GAP SERPL CALC-SCNC: 12 MMOL/L — SIGNIFICANT CHANGE UP (ref 7–14)
BUN SERPL-MCNC: 15 MG/DL — SIGNIFICANT CHANGE UP (ref 7–23)
CALCIUM SERPL-MCNC: 8.3 MG/DL — LOW (ref 8.4–10.5)
CHLORIDE SERPL-SCNC: 107 MMOL/L — SIGNIFICANT CHANGE UP (ref 98–107)
CO2 SERPL-SCNC: 22 MMOL/L — SIGNIFICANT CHANGE UP (ref 22–31)
CREAT SERPL-MCNC: 0.73 MG/DL — SIGNIFICANT CHANGE UP (ref 0.5–1.3)
EGFR: 95 ML/MIN/1.73M2 — SIGNIFICANT CHANGE UP
GLUCOSE SERPL-MCNC: 157 MG/DL — HIGH (ref 70–99)
HCT VFR BLD CALC: 28.1 % — LOW (ref 39–50)
HGB BLD-MCNC: 9.5 G/DL — LOW (ref 13–17)
MAGNESIUM SERPL-MCNC: 2 MG/DL — SIGNIFICANT CHANGE UP (ref 1.6–2.6)
MCHC RBC-ENTMCNC: 30.4 PG — SIGNIFICANT CHANGE UP (ref 27–34)
MCHC RBC-ENTMCNC: 33.8 GM/DL — SIGNIFICANT CHANGE UP (ref 32–36)
MCV RBC AUTO: 90.1 FL — SIGNIFICANT CHANGE UP (ref 80–100)
NRBC # BLD: 0 /100 WBCS — SIGNIFICANT CHANGE UP (ref 0–0)
NRBC # FLD: 0 K/UL — SIGNIFICANT CHANGE UP (ref 0–0)
PHOSPHATE SERPL-MCNC: 1.6 MG/DL — LOW (ref 2.5–4.5)
PLATELET # BLD AUTO: 156 K/UL — SIGNIFICANT CHANGE UP (ref 150–400)
POTASSIUM SERPL-MCNC: 3.7 MMOL/L — SIGNIFICANT CHANGE UP (ref 3.5–5.3)
POTASSIUM SERPL-SCNC: 3.7 MMOL/L — SIGNIFICANT CHANGE UP (ref 3.5–5.3)
RBC # BLD: 3.12 M/UL — LOW (ref 4.2–5.8)
RBC # FLD: 14.7 % — HIGH (ref 10.3–14.5)
SODIUM SERPL-SCNC: 141 MMOL/L — SIGNIFICANT CHANGE UP (ref 135–145)
WBC # BLD: 11.76 K/UL — HIGH (ref 3.8–10.5)
WBC # FLD AUTO: 11.76 K/UL — HIGH (ref 3.8–10.5)

## 2024-10-26 PROCEDURE — 99232 SBSQ HOSP IP/OBS MODERATE 35: CPT

## 2024-10-26 RX ORDER — SENNA 187 MG
2 TABLET ORAL AT BEDTIME
Refills: 0 | Status: DISCONTINUED | OUTPATIENT
Start: 2024-10-26 | End: 2024-10-27

## 2024-10-26 RX ORDER — POLYETHYLENE GLYCOL 3350 17 G/17G
17 POWDER, FOR SOLUTION ORAL DAILY
Refills: 0 | Status: DISCONTINUED | OUTPATIENT
Start: 2024-10-26 | End: 2024-10-27

## 2024-10-26 RX ORDER — POTASSIUM CHLORIDE 10 MEQ
40 TABLET, EXTENDED RELEASE ORAL ONCE
Refills: 0 | Status: DISCONTINUED | OUTPATIENT
Start: 2024-10-26 | End: 2024-10-26

## 2024-10-26 RX ORDER — POTASSIUM PHOSPHATE 236; 224 MG/ML; MG/ML
30 INJECTION, SOLUTION INTRAVENOUS ONCE
Refills: 0 | Status: COMPLETED | OUTPATIENT
Start: 2024-10-26 | End: 2024-10-26

## 2024-10-26 RX ORDER — HYDRALAZINE HYDROCHLORIDE 50 MG/1
10 TABLET, FILM COATED ORAL ONCE
Refills: 0 | Status: COMPLETED | OUTPATIENT
Start: 2024-10-26 | End: 2024-10-26

## 2024-10-26 RX ORDER — POTASSIUM CHLORIDE 10 MEQ
20 TABLET, EXTENDED RELEASE ORAL ONCE
Refills: 0 | Status: COMPLETED | OUTPATIENT
Start: 2024-10-26 | End: 2024-10-26

## 2024-10-26 RX ORDER — LABETALOL HCL 200 MG
100 TABLET ORAL ONCE
Refills: 0 | Status: COMPLETED | OUTPATIENT
Start: 2024-10-26 | End: 2024-10-26

## 2024-10-26 RX ORDER — FINASTERIDE 5 MG/1
5 TABLET, FILM COATED ORAL DAILY
Refills: 0 | Status: DISCONTINUED | OUTPATIENT
Start: 2024-10-26 | End: 2024-10-29

## 2024-10-26 RX ORDER — POTASSIUM CHLORIDE 10 MEQ
30 TABLET, EXTENDED RELEASE ORAL ONCE
Refills: 0 | Status: DISCONTINUED | OUTPATIENT
Start: 2024-10-26 | End: 2024-10-26

## 2024-10-26 RX ORDER — LABETALOL HCL 200 MG
200 TABLET ORAL EVERY 8 HOURS
Refills: 0 | Status: DISCONTINUED | OUTPATIENT
Start: 2024-10-26 | End: 2024-11-01

## 2024-10-26 RX ADMIN — Medication 975 MILLIGRAM(S): at 00:00

## 2024-10-26 RX ADMIN — HYDRALAZINE HYDROCHLORIDE 10 MILLIGRAM(S): 50 TABLET, FILM COATED ORAL at 05:28

## 2024-10-26 RX ADMIN — Medication 40 MILLIGRAM(S): at 05:03

## 2024-10-26 RX ADMIN — PANTOPRAZOLE SODIUM 40 MILLIGRAM(S): 40 TABLET, DELAYED RELEASE ORAL at 17:34

## 2024-10-26 RX ADMIN — POTASSIUM PHOSPHATE 83.33 MILLIMOLE(S): 236; 224 INJECTION, SOLUTION INTRAVENOUS at 06:56

## 2024-10-26 RX ADMIN — Medication 100 MILLIGRAM(S): at 09:38

## 2024-10-26 RX ADMIN — GABAPENTIN 600 MILLIGRAM(S): 300 CAPSULE ORAL at 11:20

## 2024-10-26 RX ADMIN — Medication 975 MILLIGRAM(S): at 18:12

## 2024-10-26 RX ADMIN — Medication 975 MILLIGRAM(S): at 12:00

## 2024-10-26 RX ADMIN — Medication 975 MILLIGRAM(S): at 11:19

## 2024-10-26 RX ADMIN — Medication 200 MILLIGRAM(S): at 21:13

## 2024-10-26 RX ADMIN — CHLORHEXIDINE GLUCONATE 15 MILLILITER(S): 40 SOLUTION TOPICAL at 17:33

## 2024-10-26 RX ADMIN — Medication 20 MILLIEQUIVALENT(S): at 06:20

## 2024-10-26 RX ADMIN — Medication 975 MILLIGRAM(S): at 05:03

## 2024-10-26 RX ADMIN — CHLORHEXIDINE GLUCONATE 15 MILLILITER(S): 40 SOLUTION TOPICAL at 05:03

## 2024-10-26 RX ADMIN — Medication 975 MILLIGRAM(S): at 17:33

## 2024-10-26 RX ADMIN — FINASTERIDE 5 MILLIGRAM(S): 5 TABLET, FILM COATED ORAL at 11:18

## 2024-10-26 RX ADMIN — Medication 100 MILLIGRAM(S): at 05:04

## 2024-10-26 RX ADMIN — HYDRALAZINE HYDROCHLORIDE 10 MILLIGRAM(S): 50 TABLET, FILM COATED ORAL at 08:25

## 2024-10-26 RX ADMIN — Medication 200 MILLIGRAM(S): at 13:27

## 2024-10-26 NOTE — DIETITIAN INITIAL EVALUATION ADULT - PERTINENT MEDS FT
MEDICATIONS  (STANDING):  acetaminophen   Oral Liquid .. 975 milliGRAM(s) Oral every 6 hours  chlorhexidine 0.12% Liquid 15 milliLiter(s) Oral Mucosa two times a day  enoxaparin Injectable 40 milliGRAM(s) SubCutaneous every 24 hours  finasteride 5 milliGRAM(s) Oral daily  gabapentin 600 milliGRAM(s) Oral daily  labetalol 200 milliGRAM(s) Oral every 8 hours  pantoprazole  Injectable 40 milliGRAM(s) IV Push every 24 hours  polyethylene glycol 3350 17 Gram(s) Oral daily  senna 1 Tablet(s) Oral daily    MEDICATIONS  (PRN):  ondansetron Injectable 4 milliGRAM(s) IV Push every 8 hours PRN Nausea and/or Vomiting  oxyCODONE    Solution 5 milliGRAM(s) Oral every 6 hours PRN Severe Pain (7 - 10)  oxyCODONE    Solution 2.5 milliGRAM(s) Oral every 6 hours PRN Moderate Pain (4 - 6)

## 2024-10-26 NOTE — DIETITIAN INITIAL EVALUATION ADULT - NSFNSGIIOFT_GEN_A_CORE
10-25-24 @ 07:01  -  10-26-24 @ 07:00  --------------------------------------------------------  OUT:  Total OUT: 0 mL    Total NET: 538 mL      10-26-24 @ 07:01  -  10-26-24 @ 14:32  --------------------------------------------------------  OUT:  Total OUT: 0 mL    Total NET: 205 mL

## 2024-10-26 NOTE — DIETITIAN INITIAL EVALUATION ADULT - NS FNS DIET ORDER
Diet, NPO with Tube Feed:   Tube Feeding Modality: Nasogastric  Pivot 1.5 Osvaldo (PIVOT1.5RTH)  Total Volume for 24 Hours (mL): 984  Continuous  Starting Tube Feed Rate {mL per Hour}: 10  Increase Tube Feed Rate by (mL): 10     Every 3 hours  Until Goal Tube Feed Rate (mL per Hour): 41  Tube Feed Duration (in Hours): 24  Tube Feed Start Time: 12:00 (10-25-24 @ 10:07) [Active]

## 2024-10-26 NOTE — PROGRESS NOTE ADULT - ASSESSMENT
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    - Q2h flap checks at 48 hrs, Cook doppler in place  - Monitor ACACIA drain output  - Monitor wound vac output, will take down on POD7  - Skin graft donor site dressing down on POD7, reinforce PRN  - under ERAS protocol care  - Pain control  - Appreciate SICU care    Plastic Surgery   [facility]: [id number]  [facility]: [phone]

## 2024-10-26 NOTE — PROGRESS NOTE ADULT - ASSESSMENT
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      Neuro  - n/v checks q2h  - cook doppler  - Tylenol, gabapentin, PRN oxy    Resp  - RA, NC PRN  - incentive spirometer 10x/hr    CV  - post op hypertension  - SBP goal < 140 (cleviprex gtt +/- IVP labetalol )  - wean drip as tolerated    - >?baroreflex dysfunction given acute episode of HTN post op neck dissection    GI/Nutrition  - NPO/IVF  - GI ppx with protonix  - KO tube in place , feeds to start POD#1    /Renal  - strict Is/Os  - MARK lebron in AM     Heme  - DVT PPX (Lovenox)    Endo  - monitor serum glucose  - decadron 8mg Q8h x124 hrs   - check TSH /ha1c level     ID  - unasyn 24h post-op - completed    Lines  - a line  - bernardino  - Darin tube  - IV  - JPx1  - vacx1    Dispo  - SICU     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      Neuro  - Flap checks q2, advance to q4 in PM  - Tylenol, gabapentin, PRN oxy    Resp  - RA, NC PRN    CV  - SBP goal < 140  - Off cleviprex gtt  - Labetolol  q8    GI/Nutrition  - TF through KO tube  - Protonix qD    /Renal  - Passed TOV but retaining  - Finasteride started 10/26    Heme  Lovenox for DVT ppx    Endo  - Monitor serum glucose  - Decadron 8mg q8 for 24 hours completed    ID  - Unasyn post-op completed    Lines  - a line  - Ko tube  - pIV  - JPx1  - vacx1    Dispo  - SICU

## 2024-10-26 NOTE — PROGRESS NOTE ADULT - SUBJECTIVE AND OBJECTIVE BOX
OTOLARYNGOLOGY (ENT) PROGRESS NOTE    PATIENT: AUSTYN LINTON  MRN: [id number]  : 48  [id number]  DATE OF SERVICE:  10-26-24  			        Subjective/ Interval:   Subjective/ Interval: Patient seen and examined at bedside in the SICU. Transferred from OR to SICU w/o complications. NGT confirmed in place on CXR.   10/25: Patient seen and examined at bedside in SICU. Required labetalol x10mg overnight and cleviprex drip to control BP  10/26: Patient seen and examined at bedside. AFVSS, given hydral and labetalol x1 at 7-8pm, placed back on cleviprex drip x5-10mins and then taken off for whole night, SBP started to increase to 150s ~5a given hydral x1, was having small volume voiding ~100cc bladder scan ~300, was OOBTC    ALLERGIES:  latex (Rash)  No Known Drug Allergies      MEDICATIONS:  Antiinfectives:     IV fluids:    Hematologic/Anticoagulation:  enoxaparin Injectable 40 milliGRAM(s) SubCutaneous every 24 hours    Pain medications/Neuro:  acetaminophen   Oral Liquid .. 975 milliGRAM(s) Oral every 6 hours  gabapentin 600 milliGRAM(s) Oral daily  ondansetron Injectable 4 milliGRAM(s) IV Push every 8 hours PRN  oxyCODONE    Solution 5 milliGRAM(s) Oral every 6 hours PRN  oxyCODONE    Solution 2.5 milliGRAM(s) Oral every 6 hours PRN    Endocrine Medications:     All other standing medications:   chlorhexidine 0.12% Liquid 15 milliLiter(s) Oral Mucosa two times a day  clevidipine Infusion 2 mG/Hr IV Continuous <Continuous>  labetalol 100 milliGRAM(s) Oral every 8 hours  pantoprazole  Injectable 40 milliGRAM(s) IV Push every 24 hours  polyethylene glycol 3350 17 Gram(s) Oral daily  senna 1 Tablet(s) Oral daily    All other PRN medications:    Vital Signs Last 24 Hrs  T(C): 36 (26 Oct 2024 08:00), Max: 37 (26 Oct 2024 04:00)  T(F): 96.8 (26 Oct 2024 08:00), Max: 98.6 (26 Oct 2024 04:00)  HR: 93 (26 Oct 2024 08:40) (61 - 94)  BP: 117/53 (25 Oct 2024 12:00) (117/53 - 117/53)  BP(mean): 71 (25 Oct 2024 12:00) (71 - 71)  RR: 18 (26 Oct 2024 08:40) (12 - 22)  SpO2: 96% (26 Oct 2024 08:40) (92% - 97%)    Parameters below as of 26 Oct 2024 08:00  Patient On (Oxygen Delivery Method): room air          10-25 @ :  -  10-26 @ 07:00  --------------------------------------------------------  IN:    Clevidipine: 124 mL    Free Water: 235 mL    IV PiggyBack: 750 mL    Lactated Ringers: 200 mL    Pivot 1.5: 538 mL  Total IN: 1847 mL    OUT:    Drain (mL): 80 mL    VAC (Vacuum Assisted Closure) System (mL): 0 mL    Voided (mL): 750 mL  Total OUT: 830 mL    Total NET: 1017 mL      10-26 @ :01  -  10-26 @ 09:00  --------------------------------------------------------  IN:    Pivot 1.5: 82 mL  Total IN: 82 mL    OUT:    Drain (mL): 20 mL    VAC (Vacuum Assisted Closure) System (mL): 0 mL  Total OUT: 20 mL    Total NET: 62 mL          10-25-24 @ 07:01  -  10-26-24 @ 07:00  --------------------------------------------------------  IN:  Total IN: 0 mL    OUT:    Drain (mL): 80 mL    VAC (Vacuum Assisted Closure) System (mL): 0 mL  Total OUT: 80 mL    Total NET: -80 mL      10-26-24 @ 07:01  -  10-26-24 @ 09:00  --------------------------------------------------------  IN:  Total IN: 0 mL    OUT:    Drain (mL): 20 mL    VAC (Vacuum Assisted Closure) System (mL): 0 mL  Total OUT: 20 mL    Total NET: -20 mL            PHYSICAL EXAM:   NAD, resting comfortably in bed   Neck flat and supple, no collection. Incision closed with staples c/d/i  ACACIA L neck w/ SS output  Cook doppler with strong arterial signal. flap with medial ecchymosis - in line with maxillary teeth. Likely post-operative vs 2/2 biting insensate flap. No concern for congestion. flap with bleeding on scratch. Incisions intact.  OC/OP: no erythema, bleeding, lacerations  Donor site covered w/ ACE bandage, wound vac in place       LABS                       9.5    11.76 )-----------( 156      ( 26 Oct 2024 01:35 )             28.1    10-26    141  |  107  |  15  ----------------------------<  157[H]  3.7   |  22  |  0.73    Ca    8.3[L]      26 Oct 2024 01:35  Phos  1.6     10-26  Mg     2.00     10-26           Coagulation Studies-     Urinalysis Basic - ( 26 Oct 2024 01:35 )    Color: x / Appearance: x / SG: x / pH: x  Gluc: 157 mg/dL / Ketone: x  / Bili: x / Urobili: x   Blood: x / Protein: x / Nitrite: x   Leuk Esterase: x / RBC: x / WBC x   Sq Epi: x / Non Sq Epi: x / Bacteria: x      Endocrine Panel-  Calcium: 8.3 mg/dL (10-26 @ 01:35)                MICROBIOLOGY:        RADIOLOGY & ADDITIONAL STUDIES:

## 2024-10-26 NOTE — PROGRESS NOTE ADULT - SUBJECTIVE AND OBJECTIVE BOX
Plastic Surgery Progress Note (pg [facility]: [id number], [facility]: [phone])    SUBJECTIVE  The patient was seen and examined.     OBJECTIVE  ___________________________________________________  VITAL SIGNS / I&O's   Vital Signs Last 24 Hrs  T(C): 37 (26 Oct 2024 04:00), Max: 37 (26 Oct 2024 04:00)  T(F): 98.6 (26 Oct 2024 04:00), Max: 98.6 (26 Oct 2024 04:00)  HR: 93 (26 Oct 2024 06:00) (60 - 93)  BP: 117/53 (25 Oct 2024 12:00) (117/53 - 119/62)  BP(mean): 71 (25 Oct 2024 12:00) (71 - 77)  RR: 16 (26 Oct 2024 06:00) (12 - 22)  SpO2: 95% (26 Oct 2024 06:00) (91% - 97%)    Parameters below as of 26 Oct 2024 00:00  Patient On (Oxygen Delivery Method): room air          25 Oct 2024 07:01  -  26 Oct 2024 07:00  --------------------------------------------------------  IN:    Clevidipine: 124 mL    Free Water: 75 mL    IV PiggyBack: 250 mL    Lactated Ringers: 200 mL    Pivot 1.5: 456 mL  Total IN: 1105 mL    OUT:    Drain (mL): 80 mL    VAC (Vacuum Assisted Closure) System (mL): 0 mL    Voided (mL): 500 mL  Total OUT: 580 mL    Total NET: 525 mL        ___________________________________________________  PHYSICAL EXAM    -- CONSTITUTIONAL: NAD, lying in bed  -- NEURO: Awake, alert  -- HEENT: NC/AT, mucous membranes moist. NGT. Intraoral flap viable appearing w/ good color and temperature, small area central bruising. Cook signal strong and triphasic. ACACIA drain w/ SS output  -- NECK: Soft, no collections, neck incision intact  -- ABDOMEN: Nondistended  -- EXTREMITIES: L arm wound vac in place intact to suction.  -- PSYCH: Affect normal, A&Ox3    ___________________________________________________  LABS                        9.5    11.76 )-----------( 156      ( 26 Oct 2024 01:35 )             28.1     26 Oct 2024 01:35    141    |  107    |  15     ----------------------------<  157    3.7     |  22     |  0.73     Ca    8.3        26 Oct 2024 01:35  Phos  1.6       26 Oct 2024 01:35  Mg     2.00      26 Oct 2024 01:35        CAPILLARY BLOOD GLUCOSE            Urinalysis Basic - ( 26 Oct 2024 01:35 )    Color: x / Appearance: x / SG: x / pH: x  Gluc: 157 mg/dL / Ketone: x  / Bili: x / Urobili: x   Blood: x / Protein: x / Nitrite: x   Leuk Esterase: x / RBC: x / WBC x   Sq Epi: x / Non Sq Epi: x / Bacteria: x      ___________________________________________________  MICRO  Recent Cultures:    ___________________________________________________  MEDICATIONS  (STANDING):  acetaminophen   Oral Liquid .. 975 milliGRAM(s) Oral every 6 hours  chlorhexidine 0.12% Liquid 15 milliLiter(s) Oral Mucosa two times a day  clevidipine Infusion 2 mG/Hr (4 mL/Hr) IV Continuous <Continuous>  enoxaparin Injectable 40 milliGRAM(s) SubCutaneous every 24 hours  gabapentin 600 milliGRAM(s) Oral daily  labetalol 100 milliGRAM(s) Oral every 8 hours  pantoprazole  Injectable 40 milliGRAM(s) IV Push every 24 hours  polyethylene glycol 3350 17 Gram(s) Oral daily  senna 1 Tablet(s) Oral daily    MEDICATIONS  (PRN):  ondansetron Injectable 4 milliGRAM(s) IV Push every 8 hours PRN Nausea and/or Vomiting  oxyCODONE    Solution 2.5 milliGRAM(s) Oral every 6 hours PRN Moderate Pain (4 - 6)  oxyCODONE    Solution 5 milliGRAM(s) Oral every 6 hours PRN Severe Pain (7 - 10)       Plastic Surgery Progress Note (pg [facility]: [id number], [facility]: [phone])    SUBJECTIVE  The patient was seen and examined.     OBJECTIVE  ___________________________________________________  VITAL SIGNS / I&O's   Vital Signs Last 24 Hrs  T(C): 37 (26 Oct 2024 04:00), Max: 37 (26 Oct 2024 04:00)  T(F): 98.6 (26 Oct 2024 04:00), Max: 98.6 (26 Oct 2024 04:00)  HR: 93 (26 Oct 2024 06:00) (60 - 93)  BP: 117/53 (25 Oct 2024 12:00) (117/53 - 119/62)  BP(mean): 71 (25 Oct 2024 12:00) (71 - 77)  RR: 16 (26 Oct 2024 06:00) (12 - 22)  SpO2: 95% (26 Oct 2024 06:00) (91% - 97%)    Parameters below as of 26 Oct 2024 00:00  Patient On (Oxygen Delivery Method): room air          25 Oct 2024 07:01  -  26 Oct 2024 07:00  --------------------------------------------------------  IN:    Clevidipine: 124 mL    Free Water: 75 mL    IV PiggyBack: 250 mL    Lactated Ringers: 200 mL    Pivot 1.5: 456 mL  Total IN: 1105 mL    OUT:    Drain (mL): 80 mL    VAC (Vacuum Assisted Closure) System (mL): 0 mL    Voided (mL): 500 mL  Total OUT: 580 mL    Total NET: 525 mL        ___________________________________________________  PHYSICAL EXAM    -- CONSTITUTIONAL: NAD, lying in bed  -- NEURO: Awake, alert  -- HEENT: NC/AT, mucous membranes moist. NGT. Intraoral flap viable appearing w/ good color and temperature. Cook signal strong and triphasic. ACACIA drain w/ SS output, flap with medial ecchymosis - in line with maxillary teeth. Likely post-operative vs 2/2 biting insensate flap. No concern for congestion. flap with bleeding on scratch  -- NECK: Soft, no collections, neck incision intact  -- ABDOMEN: Nondistended  -- EXTREMITIES: L arm wound vac in place intact to suction.  -- PSYCH: Affect normal, A&Ox3    ___________________________________________________  LABS                        9.5    11.76 )-----------( 156      ( 26 Oct 2024 01:35 )             28.1     26 Oct 2024 01:35    141    |  107    |  15     ----------------------------<  157    3.7     |  22     |  0.73     Ca    8.3        26 Oct 2024 01:35  Phos  1.6       26 Oct 2024 01:35  Mg     2.00      26 Oct 2024 01:35        CAPILLARY BLOOD GLUCOSE            Urinalysis Basic - ( 26 Oct 2024 01:35 )    Color: x / Appearance: x / SG: x / pH: x  Gluc: 157 mg/dL / Ketone: x  / Bili: x / Urobili: x   Blood: x / Protein: x / Nitrite: x   Leuk Esterase: x / RBC: x / WBC x   Sq Epi: x / Non Sq Epi: x / Bacteria: x      ___________________________________________________  MICRO  Recent Cultures:    ___________________________________________________  MEDICATIONS  (STANDING):  acetaminophen   Oral Liquid .. 975 milliGRAM(s) Oral every 6 hours  chlorhexidine 0.12% Liquid 15 milliLiter(s) Oral Mucosa two times a day  clevidipine Infusion 2 mG/Hr (4 mL/Hr) IV Continuous <Continuous>  enoxaparin Injectable 40 milliGRAM(s) SubCutaneous every 24 hours  gabapentin 600 milliGRAM(s) Oral daily  labetalol 100 milliGRAM(s) Oral every 8 hours  pantoprazole  Injectable 40 milliGRAM(s) IV Push every 24 hours  polyethylene glycol 3350 17 Gram(s) Oral daily  senna 1 Tablet(s) Oral daily    MEDICATIONS  (PRN):  ondansetron Injectable 4 milliGRAM(s) IV Push every 8 hours PRN Nausea and/or Vomiting  oxyCODONE    Solution 2.5 milliGRAM(s) Oral every 6 hours PRN Moderate Pain (4 - 6)  oxyCODONE    Solution 5 milliGRAM(s) Oral every 6 hours PRN Severe Pain (7 - 10)

## 2024-10-26 NOTE — PROGRESS NOTE ADULT - SUBJECTIVE AND OBJECTIVE BOX
SICU Daily Progress Note  =====================================================  Interval/Overnight Events:      POD#2 from left neck dissections level 1-3 and excision of lesion of left gingovobuccal sulcus with sacrifice of mental nerve, marginal mandibulectomy with reconstruction using RFFF  - vac over RFFF, STSG from L thigh  - possible baroreflex dysfunction given acute episode post op HTN and recent neck dissection --> was off cleviprex gtt, on PO labetalol, required 10 IV labetalol and 10 hydral, eventually cleviprex gtt had to be restarted    Per family pt w/o siginificant hx of HTN     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      ALLERGIES:  latex (Rash)  No Known Drug Allergies      --------------------------------------------------------------------------------------    MEDICATIONS:    Neurologic Medications  acetaminophen   Oral Liquid .. 975 milliGRAM(s) Oral every 6 hours  gabapentin 600 milliGRAM(s) Oral daily  ondansetron Injectable 4 milliGRAM(s) IV Push every 8 hours PRN Nausea and/or Vomiting  oxyCODONE    Solution 5 milliGRAM(s) Oral every 6 hours PRN Severe Pain (7 - 10)  oxyCODONE    Solution 2.5 milliGRAM(s) Oral every 6 hours PRN Moderate Pain (4 - 6)    Respiratory Medications    Cardiovascular Medications  clevidipine Infusion 2 mG/Hr IV Continuous <Continuous>  labetalol 100 milliGRAM(s) Oral every 8 hours    Gastrointestinal Medications  pantoprazole  Injectable 40 milliGRAM(s) IV Push every 24 hours  polyethylene glycol 3350 17 Gram(s) Oral daily  senna 1 Tablet(s) Oral daily    Genitourinary Medications    Hematologic/Oncologic Medications  enoxaparin Injectable 40 milliGRAM(s) SubCutaneous every 24 hours    Antimicrobial/Immunologic Medications    Endocrine/Metabolic Medications    Topical/Other Medications  chlorhexidine 0.12% Liquid 15 milliLiter(s) Oral Mucosa two times a day    --------------------------------------------------------------------------------------    VITAL SIGNS:  ICU Vital Signs Last 24 Hrs  T(C): 36 (26 Oct 2024 00:00), Max: 36.2 (25 Oct 2024 04:00)  T(F): 96.8 (26 Oct 2024 00:00), Max: 97.2 (25 Oct 2024 04:00)  HR: 79 (26 Oct 2024 03:00) (60 - 93)  BP: 117/53 (25 Oct 2024 12:00) (117/53 - 122/60)  BP(mean): 71 (25 Oct 2024 12:00) (71 - 77)  ABP: 134/56 (26 Oct 2024 03:00) (108/50 - 157/63)  ABP(mean): 80 (26 Oct 2024 03:00) (70 - 92)  RR: 15 (26 Oct 2024 03:00) (12 - 22)  SpO2: 95% (26 Oct 2024 03:00) (91% - 97%)    O2 Parameters below as of 26 Oct 2024 00:00  Patient On (Oxygen Delivery Method): room air  --------------------------------------------------------------------------------------    INS AND OUTS:  I&O's Summary    24 Oct 2024 07:01  -  25 Oct 2024 07:00  --------------------------------------------------------  IN: 2551 mL / OUT: 1430 mL / NET: 1121 mL    25 Oct 2024 07:01  -  26 Oct 2024 03:17  --------------------------------------------------------  IN: 1019 mL / OUT: 550 mL / NET: 469 mL    --------------------------------------------------------------------------------------    EXAM  NAD, resting comfortably in bed   Neck flat and supple, no collection. Incision closed with staples c/d/i  ACACIA L neck w/ SS output  Cook doppler with strong arterial signal. Intraoral skin paddle pink, well perfused. Incisions intact.  OC/OP: no erythema, bleeding, lacerations  Donor site covered w/ ACE bandage, wound vac in place    METABOLIC / FLUIDS / ELECTROLYTES      HEMATOLOGIC  [x] VTE Prophylaxis: enoxaparin Injectable 40 milliGRAM(s) SubCutaneous every 24 hours    Transfusions:	[] PRBC	[] Platelets		[] FFP	[] Cryoprecipitate    INFECTIOUS DISEASE  Antimicrobials/Immunologic Medications:    Day #      of     ***    TUBES / LINES / DRAINS  ***  [x] Peripheral IV  [] Central Venous Line     	[] R	[] L	[] IJ	[] Fem	[] SC	Date Placed:   [] Arterial Line		[] R	[] L	[] Fem	[] Rad	[] Ax	Date Placed:   [] PICC		[] Midline		[] Mediport  [] Urinary Catheter		Date Placed:   [x] Necessity of urinary, arterial, and venous catheters discussed    --------------------------------------------------------------------------------------    LABS                        9.5    11.76 )-----------( 156      ( 26 Oct 2024 01:35 )             28.1   10-26    141  |  107  |  15  ----------------------------<  157[H]  3.7   |  22  |  0.73    Ca    8.3[L]      26 Oct 2024 01:35  Phos  1.6     10-26  Mg     2.00     10-26      --------------------------------------------------------------------------------------    OTHER LABORATORY:     IMAGING STUDIES:   CXR:      SICU Daily Progress Note  =====================================================  Interval/Overnight Events:     - Required hydralazine 10mg x2 and labetolol x1 to meet SBP goal <140  - Briefly on cleviprex gtt overnight  - Passed TOV but finasteride started for retention  - Q2 flap checks, plan for Q4 in PM        ALLERGIES:  latex (Rash)  No Known Drug Allergies      --------------------------------------------------------------------------------------    MEDICATIONS:    Neurologic Medications  acetaminophen   Oral Liquid .. 975 milliGRAM(s) Oral every 6 hours  gabapentin 600 milliGRAM(s) Oral daily  ondansetron Injectable 4 milliGRAM(s) IV Push every 8 hours PRN Nausea and/or Vomiting  oxyCODONE    Solution 5 milliGRAM(s) Oral every 6 hours PRN Severe Pain (7 - 10)  oxyCODONE    Solution 2.5 milliGRAM(s) Oral every 6 hours PRN Moderate Pain (4 - 6)    Respiratory Medications    Cardiovascular Medications  clevidipine Infusion 2 mG/Hr IV Continuous <Continuous>  labetalol 100 milliGRAM(s) Oral every 8 hours    Gastrointestinal Medications  pantoprazole  Injectable 40 milliGRAM(s) IV Push every 24 hours  polyethylene glycol 3350 17 Gram(s) Oral daily  senna 1 Tablet(s) Oral daily    Genitourinary Medications    Hematologic/Oncologic Medications  enoxaparin Injectable 40 milliGRAM(s) SubCutaneous every 24 hours    Antimicrobial/Immunologic Medications    Endocrine/Metabolic Medications    Topical/Other Medications  chlorhexidine 0.12% Liquid 15 milliLiter(s) Oral Mucosa two times a day    --------------------------------------------------------------------------------------    VITAL SIGNS:  ICU Vital Signs Last 24 Hrs  T(C): 36 (26 Oct 2024 00:00), Max: 36.2 (25 Oct 2024 04:00)  T(F): 96.8 (26 Oct 2024 00:00), Max: 97.2 (25 Oct 2024 04:00)  HR: 79 (26 Oct 2024 03:00) (60 - 93)  BP: 117/53 (25 Oct 2024 12:00) (117/53 - 122/60)  BP(mean): 71 (25 Oct 2024 12:00) (71 - 77)  ABP: 134/56 (26 Oct 2024 03:00) (108/50 - 157/63)  ABP(mean): 80 (26 Oct 2024 03:00) (70 - 92)  RR: 15 (26 Oct 2024 03:00) (12 - 22)  SpO2: 95% (26 Oct 2024 03:00) (91% - 97%)    O2 Parameters below as of 26 Oct 2024 00:00  Patient On (Oxygen Delivery Method): room air  --------------------------------------------------------------------------------------    INS AND OUTS:  I&O's Summary    24 Oct 2024 07:01  -  25 Oct 2024 07:00  --------------------------------------------------------  IN: 2551 mL / OUT: 1430 mL / NET: 1121 mL    25 Oct 2024 07:01  -  26 Oct 2024 03:17  --------------------------------------------------------  IN: 1019 mL / OUT: 550 mL / NET: 469 mL    --------------------------------------------------------------------------------------    EXAM  NAD, resting comfortably in bed   Neck flat and supple, no collection. Incision closed with staples c/d/i  ACACIA L neck w/ SS output  Cook doppler with strong arterial signal. Intraoral skin paddle pink, well perfused. Incisions intact.  OC/OP: no erythema, bleeding, lacerations  Donor site covered w/ ACE bandage, wound vac in place    METABOLIC / FLUIDS / ELECTROLYTES      HEMATOLOGIC  [x] VTE Prophylaxis: enoxaparin Injectable 40 milliGRAM(s) SubCutaneous every 24 hours    Transfusions:	[] PRBC	[] Platelets		[] FFP	[] Cryoprecipitate    INFECTIOUS DISEASE  Antimicrobials/Immunologic Medications:    Day #      of     ***    TUBES / LINES / DRAINS  ***  [x] Peripheral IV  [] Central Venous Line     	[] R	[] L	[] IJ	[] Fem	[] SC	Date Placed:   [] Arterial Line		[] R	[] L	[] Fem	[] Rad	[] Ax	Date Placed:   [] PICC		[] Midline		[] Mediport  [] Urinary Catheter		Date Placed:   [x] Necessity of urinary, arterial, and venous catheters discussed    --------------------------------------------------------------------------------------    LABS                        9.5    11.76 )-----------( 156      ( 26 Oct 2024 01:35 )             28.1   10-26    141  |  107  |  15  ----------------------------<  157[H]  3.7   |  22  |  0.73    Ca    8.3[L]      26 Oct 2024 01:35  Phos  1.6     10-26  Mg     2.00     10-26      --------------------------------------------------------------------------------------    OTHER LABORATORY:     IMAGING STUDIES:   CXR:

## 2024-10-26 NOTE — DIETITIAN INITIAL EVALUATION ADULT - ADD RECOMMEND
Consider discontinuing bowel regimen.   Monitor tolerance to TF, weight trends, nutrition related lab values, BMs/GI distress, hydration status, skin integrity.

## 2024-10-26 NOTE — PROGRESS NOTE ADULT - ASSESSMENT
AUSTYN LINTON is a  73yMale w/ SCC of left gingivolabial sulcus s/p L SND 1-3, excision of gingival lesion and marginal mandibulectomy, L RFFF w/ STSG from L thigh, transferred to SICU for flap monitoring.    PLAN:  - Pain meds (Standing tylenol 975mg q6 hrs or 650mg q6 if <60kg, gabapentin 600mg qd if age <70 and no diuretic rx, opioids as needed)  - Flap checks  q1x24, q2x24, q4x72  - Baci 2x/d to neck incisions  - Lovenox starting POD1 or Heparin 5000units TID if GFR <30   - Feeds POD1  - OOB POD1  - dc lebron POD1  - Unasyn x24h  - Decadron 8mg x3 doses  - Peridex swish and spit BID  - Esomeprazole 20 mg BID or pantoprazole 40 QD  - Bowel regimen (senna + miralax)  - Incentive spirometry  - f/u HbA1c and TSH    Page ENT with any questions/concerns.  Peds Page #[id number]  Adult Page #[id number]

## 2024-10-26 NOTE — DIETITIAN INITIAL EVALUATION ADULT - OTHER INFO
Pt seen with wife at bedside receiving NGT feeds and c/o diarrhea. Noted pt on senna and Miralex per medication standing orders which might be the cause of diarrhea. Suggest discontinue bowel regimen as pt not in need for it at present. Pt on feeds of Pivot 1.5 @41ml/hr x24 hours goal rate which provides 1476 kcal, 94 gm protein, 750 ml free H20, 984 ml total volume and not meeting nutritional needs. Rec increase TF rate to Pivot 1.5 at 55ml/hr x24 goal rate to provide 1980 kcal, 129 gm protein, 1320 ml total volume, 1019 free H2o. Water flushes per MD discretion.  Pt noted with surgical incision to Lt neck, +2 edema Lt face per nursing flow sheet.

## 2024-10-26 NOTE — DIETITIAN INITIAL EVALUATION ADULT - ORAL INTAKE PTA/DIET HISTORY
Pt seen with wife at bedside, obtained collateral from both. [id number] and stable. Pt was eating well all 3 meals per day pta.

## 2024-10-26 NOTE — DIETITIAN INITIAL EVALUATION ADULT - PERTINENT LABORATORY DATA
10-26    141  |  107  |  15  ----------------------------<  157[H]  3.7   |  22  |  0.73    Ca    8.3[L]      26 Oct 2024 01:35  Phos  1.6     10-26  Mg     2.00     10-26    A1C with Estimated Average Glucose Result: 5.5 % (10-25-24 @ 00:20)

## 2024-10-27 LAB
ANION GAP SERPL CALC-SCNC: 13 MMOL/L — SIGNIFICANT CHANGE UP (ref 7–14)
BUN SERPL-MCNC: 19 MG/DL — SIGNIFICANT CHANGE UP (ref 7–23)
CALCIUM SERPL-MCNC: 8.1 MG/DL — LOW (ref 8.4–10.5)
CHLORIDE SERPL-SCNC: 110 MMOL/L — HIGH (ref 98–107)
CO2 SERPL-SCNC: 22 MMOL/L — SIGNIFICANT CHANGE UP (ref 22–31)
CREAT SERPL-MCNC: 0.72 MG/DL — SIGNIFICANT CHANGE UP (ref 0.5–1.3)
EGFR: 96 ML/MIN/1.73M2 — SIGNIFICANT CHANGE UP
GLUCOSE SERPL-MCNC: 138 MG/DL — HIGH (ref 70–99)
HCT VFR BLD CALC: 31.3 % — LOW (ref 39–50)
HGB BLD-MCNC: 10.1 G/DL — LOW (ref 13–17)
MAGNESIUM SERPL-MCNC: 2.2 MG/DL — SIGNIFICANT CHANGE UP (ref 1.6–2.6)
MCHC RBC-ENTMCNC: 30.1 PG — SIGNIFICANT CHANGE UP (ref 27–34)
MCHC RBC-ENTMCNC: 32.3 GM/DL — SIGNIFICANT CHANGE UP (ref 32–36)
MCV RBC AUTO: 93.4 FL — SIGNIFICANT CHANGE UP (ref 80–100)
NRBC # BLD: 0 /100 WBCS — SIGNIFICANT CHANGE UP (ref 0–0)
NRBC # FLD: 0 K/UL — SIGNIFICANT CHANGE UP (ref 0–0)
PHOSPHATE SERPL-MCNC: 1.9 MG/DL — LOW (ref 2.5–4.5)
PLATELET # BLD AUTO: 154 K/UL — SIGNIFICANT CHANGE UP (ref 150–400)
POTASSIUM SERPL-MCNC: 3.8 MMOL/L — SIGNIFICANT CHANGE UP (ref 3.5–5.3)
POTASSIUM SERPL-SCNC: 3.8 MMOL/L — SIGNIFICANT CHANGE UP (ref 3.5–5.3)
RBC # BLD: 3.35 M/UL — LOW (ref 4.2–5.8)
RBC # FLD: 15.5 % — HIGH (ref 10.3–14.5)
SODIUM SERPL-SCNC: 145 MMOL/L — SIGNIFICANT CHANGE UP (ref 135–145)
WBC # BLD: 8.99 K/UL — SIGNIFICANT CHANGE UP (ref 3.8–10.5)
WBC # FLD AUTO: 8.99 K/UL — SIGNIFICANT CHANGE UP (ref 3.8–10.5)

## 2024-10-27 RX ORDER — DIBASIC SODIUM PHOSPHATE, MONOBASIC POTASSIUM PHOSPHATE AND MONOBASIC SODIUM PHOSPHATE 852; 155; 130 MG/1; MG/1; MG/1
1 TABLET ORAL ONCE
Refills: 0 | Status: COMPLETED | OUTPATIENT
Start: 2024-10-27 | End: 2024-10-27

## 2024-10-27 RX ORDER — MELATONIN 5 MG
3 TABLET ORAL AT BEDTIME
Refills: 0 | Status: DISCONTINUED | OUTPATIENT
Start: 2024-10-27 | End: 2024-10-29

## 2024-10-27 RX ORDER — POTASSIUM PHOSPHATE 236; 224 MG/ML; MG/ML
30 INJECTION, SOLUTION INTRAVENOUS ONCE
Refills: 0 | Status: COMPLETED | OUTPATIENT
Start: 2024-10-27 | End: 2024-10-27

## 2024-10-27 RX ORDER — MELATONIN 5 MG
3 TABLET ORAL ONCE
Refills: 0 | Status: DISCONTINUED | OUTPATIENT
Start: 2024-10-27 | End: 2024-10-27

## 2024-10-27 RX ORDER — MELATONIN 5 MG
3 TABLET ORAL ONCE
Refills: 0 | Status: COMPLETED | OUTPATIENT
Start: 2024-10-27 | End: 2024-10-27

## 2024-10-27 RX ADMIN — Medication 40 MILLIGRAM(S): at 05:49

## 2024-10-27 RX ADMIN — Medication 975 MILLIGRAM(S): at 17:28

## 2024-10-27 RX ADMIN — PANTOPRAZOLE SODIUM 40 MILLIGRAM(S): 40 TABLET, DELAYED RELEASE ORAL at 17:27

## 2024-10-27 RX ADMIN — Medication 3 MILLIGRAM(S): at 23:23

## 2024-10-27 RX ADMIN — Medication 975 MILLIGRAM(S): at 06:04

## 2024-10-27 RX ADMIN — Medication 975 MILLIGRAM(S): at 23:05

## 2024-10-27 RX ADMIN — CHLORHEXIDINE GLUCONATE 15 MILLILITER(S): 40 SOLUTION TOPICAL at 05:49

## 2024-10-27 RX ADMIN — GABAPENTIN 600 MILLIGRAM(S): 300 CAPSULE ORAL at 11:32

## 2024-10-27 RX ADMIN — Medication 975 MILLIGRAM(S): at 05:49

## 2024-10-27 RX ADMIN — DIBASIC SODIUM PHOSPHATE, MONOBASIC POTASSIUM PHOSPHATE AND MONOBASIC SODIUM PHOSPHATE 1 PACKET(S): 852; 155; 130 TABLET ORAL at 05:49

## 2024-10-27 RX ADMIN — Medication 975 MILLIGRAM(S): at 12:45

## 2024-10-27 RX ADMIN — Medication 975 MILLIGRAM(S): at 01:13

## 2024-10-27 RX ADMIN — Medication 975 MILLIGRAM(S): at 00:53

## 2024-10-27 RX ADMIN — CHLORHEXIDINE GLUCONATE 15 MILLILITER(S): 40 SOLUTION TOPICAL at 17:28

## 2024-10-27 RX ADMIN — POTASSIUM PHOSPHATE 83.33 MILLIMOLE(S): 236; 224 INJECTION, SOLUTION INTRAVENOUS at 02:22

## 2024-10-27 RX ADMIN — Medication 200 MILLIGRAM(S): at 05:49

## 2024-10-27 RX ADMIN — Medication 200 MILLIGRAM(S): at 20:21

## 2024-10-27 RX ADMIN — Medication 975 MILLIGRAM(S): at 11:32

## 2024-10-27 RX ADMIN — Medication 3 MILLIGRAM(S): at 00:57

## 2024-10-27 RX ADMIN — FINASTERIDE 5 MILLIGRAM(S): 5 TABLET, FILM COATED ORAL at 11:32

## 2024-10-27 NOTE — PHYSICAL THERAPY INITIAL EVALUATION ADULT - LEVEL OF INDEPENDENCE: SUPINE/SIT, REHAB EVAL
pt received and left sitting in bedside chair/unable to perform Sotyktu Counseling:  I discussed the most common side effects of Sotyktu including: common cold, sore throat, sinus infections, cold sores, canker sores, folliculitis, and acne.  I also discussed more serious side effects of Sotyktu including but not limited to: serious allergic reactions; increased risk for infections such as TB; cancers such as lymphomas; rhabdomyolysis and elevated CPK; and elevated triglycerides and liver enzymes.

## 2024-10-27 NOTE — PROGRESS NOTE ADULT - SUBJECTIVE AND OBJECTIVE BOX
OTOLARYNGOLOGY (ENT) PROGRESS NOTE    PATIENT: AUSTYN LINTON  MRN: [id number]  : 48  [id number]  DATE OF SERVICE:  10-27-24  			        Subjective/ Interval:   Subjective/ Interval: Patient seen and examined at bedside in the SICU. Transferred from OR to SICU w/o complications. NGT confirmed in place on CXR.   10/25: Patient seen and examined at bedside in SICU. Required labetalol x10mg overnight and cleviprex drip to control BP  10/26: Patient seen and examined at bedside. AFVSS, given hydral and labetalol x1 at 7-8pm, placed back on cleviprex drip x5-10mins and then taken off for whole night, SBP started to increase to 150s ~5a given hydral x1, was having small volume voiding ~100cc bladder scan ~300, was OOBTC  10/27: Pt seen and examined at bedside. A-line discontinued, transitioned to q4h checks. Finasteride initiated for urinary retention. Has been off cleviprex gtt.     ALLERGIES:  latex (Rash)  No Known Drug Allergies      MEDICATIONS:  Antiinfectives:     IV fluids:    Hematologic/Anticoagulation:  enoxaparin Injectable 40 milliGRAM(s) SubCutaneous every 24 hours    Pain medications/Neuro:  acetaminophen   Oral Liquid .. 975 milliGRAM(s) Oral every 6 hours  gabapentin 600 milliGRAM(s) Oral daily  ondansetron Injectable 4 milliGRAM(s) IV Push every 8 hours PRN  oxyCODONE    Solution 5 milliGRAM(s) Oral every 6 hours PRN  oxyCODONE    Solution 2.5 milliGRAM(s) Oral every 6 hours PRN    Endocrine Medications:     All other standing medications:   chlorhexidine 0.12% Liquid 15 milliLiter(s) Oral Mucosa two times a day  clevidipine Infusion 2 mG/Hr IV Continuous <Continuous>  labetalol 100 milliGRAM(s) Oral every 8 hours  pantoprazole  Injectable 40 milliGRAM(s) IV Push every 24 hours  polyethylene glycol 3350 17 Gram(s) Oral daily  senna 1 Tablet(s) Oral daily    All other PRN medications:    Vital Signs Last 24 Hrs  T(C): 36 (26 Oct 2024 08:00), Max: 37 (26 Oct 2024 04:00)  T(F): 96.8 (26 Oct 2024 08:00), Max: 98.6 (26 Oct 2024 04:00)  HR: 93 (26 Oct 2024 08:40) (61 - 94)  BP: 117/53 (25 Oct 2024 12:00) (117/53 - 117/53)  BP(mean): 71 (25 Oct 2024 12:00) (71 - 71)  RR: 18 (26 Oct 2024 08:40) (12 - 22)  SpO2: 96% (26 Oct 2024 08:40) (92% - 97%)    Parameters below as of 26 Oct 2024 08:00  Patient On (Oxygen Delivery Method): room air          10-25 @ :  -  10-26 @ 07:00  --------------------------------------------------------  IN:    Clevidipine: 124 mL    Free Water: 235 mL    IV PiggyBack: 750 mL    Lactated Ringers: 200 mL    Pivot 1.5: 538 mL  Total IN: 1847 mL    OUT:    Drain (mL): 80 mL    VAC (Vacuum Assisted Closure) System (mL): 0 mL    Voided (mL): 750 mL  Total OUT: 830 mL    Total NET: 1017 mL      10-26 @ :  -  10-26 @ 09:00  --------------------------------------------------------  IN:    Pivot 1.5: 82 mL  Total IN: 82 mL    OUT:    Drain (mL): 20 mL    VAC (Vacuum Assisted Closure) System (mL): 0 mL  Total OUT: 20 mL    Total NET: 62 mL          10-25-24 @ :  -  10-26-24 @ 07:00  --------------------------------------------------------  IN:  Total IN: 0 mL    OUT:    Drain (mL): 80 mL    VAC (Vacuum Assisted Closure) System (mL): 0 mL  Total OUT: 80 mL    Total NET: -80 mL      10-26-24 @ 07:01  -  10-26-24 @ 09:00  --------------------------------------------------------  IN:  Total IN: 0 mL    OUT:    Drain (mL): 20 mL    VAC (Vacuum Assisted Closure) System (mL): 0 mL  Total OUT: 20 mL    Total NET: -20 mL            PHYSICAL EXAM:   NAD, resting comfortably in bed   Neck flat and supple, no collection. Incision closed with staples c/d/i  ACACIA L neck w/ SS output  Cook doppler with strong arterial signal. flap with medial ecchymosis - in line with maxillary teeth. Likely post-operative vs 2/2 biting insensate flap. No concern for congestion. flap with bleeding on scratch. Incisions intact.  OC/OP: no erythema, bleeding, lacerations  Donor site covered w/ ACE bandage, wound vac in place       LABS                       9.5    11.76 )-----------( 156      ( 26 Oct 2024 01:35 )             28.1    10-26    141  |  107  |  15  ----------------------------<  157[H]  3.7   |  22  |  0.73    Ca    8.3[L]      26 Oct 2024 01:35  Phos  1.6     10-26  Mg     2.00     10-26           Coagulation Studies-     Urinalysis Basic - ( 26 Oct 2024 01:35 )    Color: x / Appearance: x / SG: x / pH: x  Gluc: 157 mg/dL / Ketone: x  / Bili: x / Urobili: x   Blood: x / Protein: x / Nitrite: x   Leuk Esterase: x / RBC: x / WBC x   Sq Epi: x / Non Sq Epi: x / Bacteria: x      Endocrine Panel-  Calcium: 8.3 mg/dL (10-26 @ 01:35)                MICROBIOLOGY:        RADIOLOGY & ADDITIONAL STUDIES:

## 2024-10-27 NOTE — PHYSICAL THERAPY INITIAL EVALUATION ADULT - PERTINENT HX OF CURRENT PROBLEM, REHAB EVAL
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

## 2024-10-27 NOTE — PHYSICAL THERAPY INITIAL EVALUATION ADULT - ADDITIONAL COMMENTS
Pt lives with his wife in a house with 1 steps to enter and a flight inside. Pt did not use an assistive device and was independent with ADLs prior. Pt reports no falls in the past 6 months.  Pt left sitting in bedside chair in NAD, all lines intact, call norman in reach and PEMA Lee made aware.

## 2024-10-27 NOTE — PHYSICAL THERAPY INITIAL EVALUATION ADULT - GENERAL OBSERVATIONS, REHAB EVAL
Chart reviewed and cleared for PT by PEMA Lee. Pt received sitting in bedside chair in NAD, all lines intact + ACACIA drain, telemetry, pulse ox, NGT, wound vac, HR 58, pts family at bedside.

## 2024-10-27 NOTE — PROGRESS NOTE ADULT - SUBJECTIVE AND OBJECTIVE BOX
Plastic Surgery Progress Note (pg [facility]: [id number], [facility]: [phone])    SUBJECTIVE  The patient was seen and examined. No acute events overnight. Pain controlled, afebrile w/ stable vitals.     OBJECTIVE  ___________________________________________________  VITAL SIGNS / I&O's   Vital Signs Last 24 Hrs  T(C): 36.2 (27 Oct 2024 04:00), Max: 36.2 (27 Oct 2024 04:00)  T(F): 97.1 (27 Oct 2024 04:00), Max: 97.1 (27 Oct 2024 04:00)  HR: 77 (27 Oct 2024 04:00) (72 - 99)  BP: 137/67 (27 Oct 2024 04:00) (131/60 - 137/67)  BP(mean): 87 (27 Oct 2024 04:00) (82 - 87)  RR: 15 (27 Oct 2024 04:00) (14 - 22)  SpO2: 95% (27 Oct 2024 04:00) (94% - 99%)    Parameters below as of 27 Oct 2024 04:00  Patient On (Oxygen Delivery Method): room air          25 Oct 2024 07:01  -  26 Oct 2024 07:00  --------------------------------------------------------  IN:    Clevidipine: 124 mL    Free Water: 235 mL    IV PiggyBack: 750 mL    Lactated Ringers: 200 mL    Pivot 1.5: 538 mL  Total IN: 1847 mL    OUT:    Drain (mL): 80 mL    VAC (Vacuum Assisted Closure) System (mL): 0 mL    Voided (mL): 750 mL  Total OUT: 830 mL    Total NET: 1017 mL      26 Oct 2024 07:01  -  27 Oct 2024 06:19  --------------------------------------------------------  IN:    Enteral Tube Flush: 470 mL    IV PiggyBack: 500 mL    Pivot 1.5: 943 mL  Total IN: 1913 mL    OUT:    Drain (mL): 95 mL    VAC (Vacuum Assisted Closure) System (mL): 0 mL    Voided (mL): 975 mL  Total OUT: 1070 mL    Total NET: 843 mL        ___________________________________________________  PHYSICAL EXAM    -- CONSTITUTIONAL: NAD, lying in bed  -- NEURO: Awake, alert  -- HEENT: NC/AT, mucous membranes moist. NGT. Intraoral flap viable appearing w/ good color and temperature. Cook signal strong and triphasic. ACACIA drain w/ SS output, flap with medial ecchymosis - in line with maxillary teeth. Likely post-operative vs 2/2 biting insensate flap. No concern for congestion. flap with bleeding on scratch  -- NECK: Soft, no collections, neck incision intact  -- ABDOMEN: Nondistended  -- EXTREMITIES: L arm wound vac in place intact to suction.  -- PSYCH: Affect normal, A&Ox3    ___________________________________________________  LABS                        10.1   8.99  )-----------( 154      ( 27 Oct 2024 00:48 )             31.3     27 Oct 2024 00:48    145    |  110    |  19     ----------------------------<  138    3.8     |  22     |  0.72     Ca    8.1        27 Oct 2024 00:48  Phos  1.9       27 Oct 2024 00:48  Mg     2.20      27 Oct 2024 00:48        CAPILLARY BLOOD GLUCOSE            Urinalysis Basic - ( 27 Oct 2024 00:48 )    Color: x / Appearance: x / SG: x / pH: x  Gluc: 138 mg/dL / Ketone: x  / Bili: x / Urobili: x   Blood: x / Protein: x / Nitrite: x   Leuk Esterase: x / RBC: x / WBC x   Sq Epi: x / Non Sq Epi: x / Bacteria: x      ___________________________________________________  MICRO  Recent Cultures:    ___________________________________________________  MEDICATIONS  (STANDING):  acetaminophen   Oral Liquid .. 975 milliGRAM(s) Oral every 6 hours  chlorhexidine 0.12% Liquid 15 milliLiter(s) Oral Mucosa two times a day  enoxaparin Injectable 40 milliGRAM(s) SubCutaneous every 24 hours  finasteride 5 milliGRAM(s) Oral daily  gabapentin 600 milliGRAM(s) Oral daily  labetalol 200 milliGRAM(s) Oral every 8 hours  melatonin 3 milliGRAM(s) Oral at bedtime  pantoprazole  Injectable 40 milliGRAM(s) IV Push every 24 hours    MEDICATIONS  (PRN):  ondansetron Injectable 4 milliGRAM(s) IV Push every 8 hours PRN Nausea and/or Vomiting  oxyCODONE    Solution 5 milliGRAM(s) Oral every 6 hours PRN Severe Pain (7 - 10)  oxyCODONE    Solution 2.5 milliGRAM(s) Oral every 6 hours PRN Moderate Pain (4 - 6)  polyethylene glycol 3350 17 Gram(s) Oral daily PRN Constipation  senna 2 Tablet(s) Oral at bedtime PRN Constipation   Plastic Surgery Progress Note (pg [facility]: [id number], [facility]: [phone])    SUBJECTIVE  The patient was seen and examined. No acute events overnight. Pain controlled, afebrile w/ stable vitals.     OBJECTIVE  ___________________________________________________  VITAL SIGNS / I&O's   Vital Signs Last 24 Hrs  T(C): 36.2 (27 Oct 2024 04:00), Max: 36.2 (27 Oct 2024 04:00)  T(F): 97.1 (27 Oct 2024 04:00), Max: 97.1 (27 Oct 2024 04:00)  HR: 77 (27 Oct 2024 04:00) (72 - 99)  BP: 137/67 (27 Oct 2024 04:00) (131/60 - 137/67)  BP(mean): 87 (27 Oct 2024 04:00) (82 - 87)  RR: 15 (27 Oct 2024 04:00) (14 - 22)  SpO2: 95% (27 Oct 2024 04:00) (94% - 99%)    Parameters below as of 27 Oct 2024 04:00  Patient On (Oxygen Delivery Method): room air          25 Oct 2024 07:01  -  26 Oct 2024 07:00  --------------------------------------------------------  IN:    Clevidipine: 124 mL    Free Water: 235 mL    IV PiggyBack: 750 mL    Lactated Ringers: 200 mL    Pivot 1.5: 538 mL  Total IN: 1847 mL    OUT:    Drain (mL): 80 mL    VAC (Vacuum Assisted Closure) System (mL): 0 mL    Voided (mL): 750 mL  Total OUT: 830 mL    Total NET: 1017 mL      26 Oct 2024 07:01  -  27 Oct 2024 06:19  --------------------------------------------------------  IN:    Enteral Tube Flush: 470 mL    IV PiggyBack: 500 mL    Pivot 1.5: 943 mL  Total IN: 1913 mL    OUT:    Drain (mL): 95 mL    VAC (Vacuum Assisted Closure) System (mL): 0 mL    Voided (mL): 975 mL  Total OUT: 1070 mL    Total NET: 843 mL        ___________________________________________________  PHYSICAL EXAM    -- CONSTITUTIONAL: NAD, lying in bed  -- NEURO: Awake, alert  -- HEENT: NC/AT, mucous membranes moist. NGT. Cook signal strong and triphasic. JPx1 SS. Intraoral flap with medial ecchymosis - in line with maxillary teeth. Likely post-operative vs 2/2 biting insensate flap. No concern for congestion. flap with bleeding on scratch  -- NECK: Soft, no collections, increased swelling from prior. Neck incision c/d/i  -- EXTREMITIES: L arm wound vac in place intact to suction.  -- PSYCH: Affect normal, A&Ox3    ___________________________________________________  LABS                        10.1   8.99  )-----------( 154      ( 27 Oct 2024 00:48 )             31.3     27 Oct 2024 00:48    145    |  110    |  19     ----------------------------<  138    3.8     |  22     |  0.72     Ca    8.1        27 Oct 2024 00:48  Phos  1.9       27 Oct 2024 00:48  Mg     2.20      27 Oct 2024 00:48        CAPILLARY BLOOD GLUCOSE            Urinalysis Basic - ( 27 Oct 2024 00:48 )    Color: x / Appearance: x / SG: x / pH: x  Gluc: 138 mg/dL / Ketone: x  / Bili: x / Urobili: x   Blood: x / Protein: x / Nitrite: x   Leuk Esterase: x / RBC: x / WBC x   Sq Epi: x / Non Sq Epi: x / Bacteria: x      ___________________________________________________  MICRO  Recent Cultures:    ___________________________________________________  MEDICATIONS  (STANDING):  acetaminophen   Oral Liquid .. 975 milliGRAM(s) Oral every 6 hours  chlorhexidine 0.12% Liquid 15 milliLiter(s) Oral Mucosa two times a day  enoxaparin Injectable 40 milliGRAM(s) SubCutaneous every 24 hours  finasteride 5 milliGRAM(s) Oral daily  gabapentin 600 milliGRAM(s) Oral daily  labetalol 200 milliGRAM(s) Oral every 8 hours  melatonin 3 milliGRAM(s) Oral at bedtime  pantoprazole  Injectable 40 milliGRAM(s) IV Push every 24 hours    MEDICATIONS  (PRN):  ondansetron Injectable 4 milliGRAM(s) IV Push every 8 hours PRN Nausea and/or Vomiting  oxyCODONE    Solution 5 milliGRAM(s) Oral every 6 hours PRN Severe Pain (7 - 10)  oxyCODONE    Solution 2.5 milliGRAM(s) Oral every 6 hours PRN Moderate Pain (4 - 6)  polyethylene glycol 3350 17 Gram(s) Oral daily PRN Constipation  senna 2 Tablet(s) Oral at bedtime PRN Constipation

## 2024-10-27 NOTE — PROGRESS NOTE ADULT - SUBJECTIVE AND OBJECTIVE BOX
SICU Daily Progress Note  =====================================================  Interval/Overnight Events:       - q4h vascular checks  - listed  - a-line dc'd    ALLERGIES:  latex (Rash)  No Known Drug Allergies      --------------------------------------------------------------------------------------    MEDICATIONS:    Neurologic Medications  acetaminophen   Oral Liquid .. 975 milliGRAM(s) Oral every 6 hours  gabapentin 600 milliGRAM(s) Oral daily  melatonin 3 milliGRAM(s) Oral at bedtime  ondansetron Injectable 4 milliGRAM(s) IV Push every 8 hours PRN Nausea and/or Vomiting  oxyCODONE    Solution 2.5 milliGRAM(s) Oral every 6 hours PRN Moderate Pain (4 - 6)  oxyCODONE    Solution 5 milliGRAM(s) Oral every 6 hours PRN Severe Pain (7 - 10)    Respiratory Medications    Cardiovascular Medications  labetalol 200 milliGRAM(s) Oral every 8 hours    Gastrointestinal Medications  pantoprazole  Injectable 40 milliGRAM(s) IV Push every 24 hours  polyethylene glycol 3350 17 Gram(s) Oral daily PRN Constipation  senna 2 Tablet(s) Oral at bedtime PRN Constipation    Genitourinary Medications    Hematologic/Oncologic Medications  enoxaparin Injectable 40 milliGRAM(s) SubCutaneous every 24 hours    Antimicrobial/Immunologic Medications    Endocrine/Metabolic Medications  finasteride 5 milliGRAM(s) Oral daily    Topical/Other Medications  chlorhexidine 0.12% Liquid 15 milliLiter(s) Oral Mucosa two times a day    --------------------------------------------------------------------------------------    VITAL SIGNS:  ICU Vital Signs Last 24 Hrs  T(C): 36.1 (26 Oct 2024 20:00), Max: 37 (26 Oct 2024 04:00)  T(F): 96.9 (26 Oct 2024 20:00), Max: 98.6 (26 Oct 2024 04:00)  HR: 75 (26 Oct 2024 20:00) (72 - 99)  BP: --  BP(mean): --  ABP: 140/58 (26 Oct 2024 20:00) (107/45 - 149/57)  ABP(mean): 83 (26 Oct 2024 20:00) (63 - 92)  RR: 15 (26 Oct 2024 20:00) (13 - 22)  SpO2: 96% (26 Oct 2024 20:00) (93% - 99%)    O2 Parameters below as of 26 Oct 2024 20:00  Patient On (Oxygen Delivery Method): room air    --------------------------------------------------------------------------------------    INS AND OUTS:  I&O's Summary    25 Oct 2024 07:01  -  26 Oct 2024 07:00  --------------------------------------------------------  IN: 1847 mL / OUT: 830 mL / NET: 1017 mL    26 Oct 2024 07:01  -  27 Oct 2024 00:52  --------------------------------------------------------  IN: 1067 mL / OUT: 615 mL / NET: 452 mL    --------------------------------------------------------------------------------------    EXAM  NAD, resting comfortably in bed   Neck flat and supple, no collection. Incision closed with staples c/d/i  ACACIA L neck w/ SS output  Cook doppler with strong arterial signal. Intraoral skin paddle pink, well perfused. Incisions intact.  OC/OP: no erythema, bleeding, lacerations  Donor site covered w/ ACE bandage, wound vac in place    METABOLIC / FLUIDS / ELECTROLYTES      HEMATOLOGIC  [x] VTE Prophylaxis: enoxaparin Injectable 40 milliGRAM(s) SubCutaneous every 24 hours    Transfusions:	[] PRBC	[] Platelets		[] FFP	[] Cryoprecipitate    INFECTIOUS DISEASE  Antimicrobials/Immunologic Medications:    Day #      of     ***    TUBES / LINES / DRAINS  ***  [x] Peripheral IV  [] Central Venous Line     	[] R	[] L	[] IJ	[] Fem	[] SC	Date Placed:   [] Arterial Line		[] R	[] L	[] Fem	[] Rad	[] Ax	Date Placed:   [] PICC		[] Midline		[] Mediport  [] Urinary Catheter		Date Placed:   [x] Necessity of urinary, arterial, and venous catheters discussed    --------------------------------------------------------------------------------------    LABS                        9.5    11.76 )-----------( 156      ( 26 Oct 2024 01:35 )             28.1   10-26    141  |  107  |  15  ----------------------------<  157[H]  3.7   |  22  |  0.73    Ca    8.3[L]      26 Oct 2024 01:35  Phos  1.6     10-26  Mg     2.00     10-26      --------------------------------------------------------------------------------------    OTHER LABORATORY:     IMAGING STUDIES:   CXR:

## 2024-10-27 NOTE — PROGRESS NOTE ADULT - ASSESSMENT
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     Interval/Overnight Events  - Passed TOV but finasteride started for retention  - Q4 flap checks  - Listed    Neuro  - Q4 flap checks  - Tylenol, gabapentin, PRN oxy    Resp  - RA, NC PRN    CV  - SBP goal < 140  - Off cleviprex gtt  - Labetolol  q8    GI/Nutrition  - TF through KO tube  - Protonix qD    /Renal  - Passed TOV but retaining  - Finasteride started 10/26    Heme  Lovenox for DVT ppx    Endo  - Monitor serum glucose  - Decadron 8mg q8 for 24 hours completed    ID  - Unasyn post-op completed    Lines  - Ko tube  - pIV  - JPx1  - vacx1    Dispo  - SICU, listed

## 2024-10-27 NOTE — PROGRESS NOTE ADULT - ASSESSMENT
AUSTYN LINTON is a  73yMale w/ SCC of left gingivolabial sulcus s/p L SND 1-3, excision of gingival lesion and marginal mandibulectomy, L RFFF w/ STSG from L thigh, transferred to SICU for flap monitoring, now transitioned to q4h checks per [facility] protocol    PLAN:  - Pain meds (Standing tylenol 975mg q6 hrs or 650mg q6 if <60kg, gabapentin 600mg qd if age <70 and no diuretic rx, opioids as needed)  - Flap checks  q1x24, q2x24, q4x72  - Baci 2x/d to neck incisions  - Lovenox starting POD1 or Heparin 5000units TID if GFR <30   - c/w TFs  - s/p Unasyn x24h  - s/p Decadron 8mg x3 doses  - Peridex swish and spit BID  - pantoprazole 40 QD  - Bowel regimen (senna + miralax)  - Incentive spirometr    Page ENT with any questions/concerns.  Peds Page #[phone]  Adult Page #[phone]

## 2024-10-27 NOTE — PROGRESS NOTE ADULT - ASSESSMENT
AUSTYN ROSSLEOBARDOBRITTON is a 73yMale s/p left FOM resection for SCC of left gingivolabial sulcus with left radial forearm free flap and STSG from left thigh.    - Flap checks per ERAS protocol with Geostellar doppler  - Monitor ACACIA drain output  - Monitor wound vac output, will take down on POD7 10/31  - Skin graft donor site dressing down on POD7 10/31, reinforce PRN  - under ERAS protocol care  - Pain control  - Appreciate care per SICU    Plastic Surgery   [facility]: [id number]  [facility]: [phone]

## 2024-10-27 NOTE — PHYSICAL THERAPY INITIAL EVALUATION ADULT - ADL SKILLS, REHAB EVAL
[Regular Cycle Intervals] : periods have been regular [Currently Active] : currently active [Men] : men [Vaginal] : vaginal [No] : No [TextBox_4] :  (twin boys and a girl.  Also a pregnancy loss at 16 weeks) LMP 24 for gynecologic care.  History of fibroids.  Menses last ~4 days - not heavy by hx. SALVADOR - taking OTC iron.  Copper containing IUD inserted ~. Feels well. No gyn complaints. Normal bladder & bowel function. Pt advised to have a progestin IUD instead - due to replace soon. Patient grew up in [place] until age 17.  College at [employer]; Pt would rather be back in [place]. Importance of annual mmg + brst u/s d/w pt based on ZIA score. See 2020 pelvic u/s - fibrid uterus / IUD  [Mammogramdate] : 8/7/2024 [TextBox_19] : Mar lifetime risk: 17.8% with family history as noted.  Heterogeneously dense tissue.  Breast arterial calcification: Grade 0.  No mammographic evidence of malignancy. [BreastSonogramDate] : 8/7/2024 [TextBox_25] : Negative [PapSmeardate] : 1/15/2020 [TextBox_31] : neg; HPV neg [TextBox_43] : At age 44 yrs. independent [FreeTextEntry1] : 8/2/24 [FreeTextEntry3] : IUD Name band;

## 2024-10-28 LAB
ANION GAP SERPL CALC-SCNC: 13 MMOL/L — SIGNIFICANT CHANGE UP (ref 7–14)
BLD GP AB SCN SERPL QL: NEGATIVE — SIGNIFICANT CHANGE UP
BUN SERPL-MCNC: 16 MG/DL — SIGNIFICANT CHANGE UP (ref 7–23)
CALCIUM SERPL-MCNC: 8.2 MG/DL — LOW (ref 8.4–10.5)
CHLORIDE SERPL-SCNC: 106 MMOL/L — SIGNIFICANT CHANGE UP (ref 98–107)
CO2 SERPL-SCNC: 24 MMOL/L — SIGNIFICANT CHANGE UP (ref 22–31)
CREAT SERPL-MCNC: 0.61 MG/DL — SIGNIFICANT CHANGE UP (ref 0.5–1.3)
EGFR: 101 ML/MIN/1.73M2 — SIGNIFICANT CHANGE UP
GLUCOSE SERPL-MCNC: 129 MG/DL — HIGH (ref 70–99)
HCT VFR BLD CALC: 31.4 % — LOW (ref 39–50)
HCT VFR BLD CALC: 31.6 % — LOW (ref 39–50)
HGB BLD-MCNC: 10.2 G/DL — LOW (ref 13–17)
HGB BLD-MCNC: 9.6 G/DL — LOW (ref 13–17)
MAGNESIUM SERPL-MCNC: 2.1 MG/DL — SIGNIFICANT CHANGE UP (ref 1.6–2.6)
MCHC RBC-ENTMCNC: 30.4 GM/DL — LOW (ref 32–36)
MCHC RBC-ENTMCNC: 30.4 PG — SIGNIFICANT CHANGE UP (ref 27–34)
MCHC RBC-ENTMCNC: 30.9 PG — SIGNIFICANT CHANGE UP (ref 27–34)
MCHC RBC-ENTMCNC: 32.5 GM/DL — SIGNIFICANT CHANGE UP (ref 32–36)
MCV RBC AUTO: 101.6 FL — HIGH (ref 80–100)
MCV RBC AUTO: 93.5 FL — SIGNIFICANT CHANGE UP (ref 80–100)
NRBC # BLD: 0 /100 WBCS — SIGNIFICANT CHANGE UP (ref 0–0)
NRBC # BLD: 0 /100 WBCS — SIGNIFICANT CHANGE UP (ref 0–0)
NRBC # FLD: 0 K/UL — SIGNIFICANT CHANGE UP (ref 0–0)
NRBC # FLD: 0 K/UL — SIGNIFICANT CHANGE UP (ref 0–0)
PHOSPHATE SERPL-MCNC: 2.6 MG/DL — SIGNIFICANT CHANGE UP (ref 2.5–4.5)
PLATELET # BLD AUTO: 166 K/UL — SIGNIFICANT CHANGE UP (ref 150–400)
PLATELET # BLD AUTO: 172 K/UL — SIGNIFICANT CHANGE UP (ref 150–400)
POTASSIUM SERPL-MCNC: 4.1 MMOL/L — SIGNIFICANT CHANGE UP (ref 3.5–5.3)
POTASSIUM SERPL-SCNC: 4.1 MMOL/L — SIGNIFICANT CHANGE UP (ref 3.5–5.3)
RBC # BLD: 3.11 M/UL — LOW (ref 4.2–5.8)
RBC # BLD: 3.36 M/UL — LOW (ref 4.2–5.8)
RBC # FLD: 15.1 % — HIGH (ref 10.3–14.5)
RBC # FLD: 15.5 % — HIGH (ref 10.3–14.5)
RH IG SCN BLD-IMP: POSITIVE — SIGNIFICANT CHANGE UP
SODIUM SERPL-SCNC: 143 MMOL/L — SIGNIFICANT CHANGE UP (ref 135–145)
WBC # BLD: 15.21 K/UL — HIGH (ref 3.8–10.5)
WBC # BLD: 6.49 K/UL — SIGNIFICANT CHANGE UP (ref 3.8–10.5)
WBC # FLD AUTO: 15.21 K/UL — HIGH (ref 3.8–10.5)
WBC # FLD AUTO: 6.49 K/UL — SIGNIFICANT CHANGE UP (ref 3.8–10.5)

## 2024-10-28 PROCEDURE — 71045 X-RAY EXAM CHEST 1 VIEW: CPT | Mod: 26

## 2024-10-28 PROCEDURE — 99233 SBSQ HOSP IP/OBS HIGH 50: CPT

## 2024-10-28 RX ORDER — POLYETHYLENE GLYCOL 3350 17 G/17G
17 POWDER, FOR SOLUTION ORAL DAILY
Refills: 0 | Status: DISCONTINUED | OUTPATIENT
Start: 2024-10-28 | End: 2024-10-29

## 2024-10-28 RX ORDER — SENNA 187 MG
10 TABLET ORAL AT BEDTIME
Refills: 0 | Status: DISCONTINUED | OUTPATIENT
Start: 2024-10-28 | End: 2024-10-29

## 2024-10-28 RX ORDER — POLYETHYLENE GLYCOL 3350 17 G/17G
17 POWDER, FOR SOLUTION ORAL DAILY
Refills: 0 | Status: DISCONTINUED | OUTPATIENT
Start: 2024-10-28 | End: 2024-10-28

## 2024-10-28 RX ORDER — SENNA 187 MG
10 TABLET ORAL AT BEDTIME
Refills: 0 | Status: DISCONTINUED | OUTPATIENT
Start: 2024-10-28 | End: 2024-10-28

## 2024-10-28 RX ADMIN — CHLORHEXIDINE GLUCONATE 15 MILLILITER(S): 40 SOLUTION TOPICAL at 05:04

## 2024-10-28 RX ADMIN — FINASTERIDE 5 MILLIGRAM(S): 5 TABLET, FILM COATED ORAL at 11:20

## 2024-10-28 RX ADMIN — Medication 975 MILLIGRAM(S): at 18:15

## 2024-10-28 RX ADMIN — CHLORHEXIDINE GLUCONATE 15 MILLILITER(S): 40 SOLUTION TOPICAL at 17:30

## 2024-10-28 RX ADMIN — Medication 200 MILLIGRAM(S): at 05:04

## 2024-10-28 RX ADMIN — Medication 975 MILLIGRAM(S): at 11:15

## 2024-10-28 RX ADMIN — Medication 975 MILLIGRAM(S): at 17:31

## 2024-10-28 RX ADMIN — Medication 975 MILLIGRAM(S): at 11:20

## 2024-10-28 RX ADMIN — Medication 975 MILLIGRAM(S): at 05:04

## 2024-10-28 RX ADMIN — PANTOPRAZOLE SODIUM 40 MILLIGRAM(S): 40 TABLET, DELAYED RELEASE ORAL at 13:00

## 2024-10-28 RX ADMIN — Medication 200 MILLIGRAM(S): at 22:17

## 2024-10-28 RX ADMIN — Medication 3 MILLIGRAM(S): at 22:16

## 2024-10-28 RX ADMIN — Medication 40 MILLIGRAM(S): at 05:05

## 2024-10-28 RX ADMIN — GABAPENTIN 600 MILLIGRAM(S): 300 CAPSULE ORAL at 11:20

## 2024-10-28 RX ADMIN — Medication 975 MILLIGRAM(S): at 12:30

## 2024-10-28 NOTE — PROGRESS NOTE ADULT - SUBJECTIVE AND OBJECTIVE BOX
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      Patient transferred to SICU post-operatively for q1h neurovascular checks.     --------------------------------------------------------------------------------------    MEDICATIONS:    Neurologic Medications:  acetaminophen   Oral Liquid .. 975 milliGRAM(s) Oral every 6 hours  gabapentin 600 milliGRAM(s) Oral daily  melatonin 3 milliGRAM(s) Oral at bedtime  ondansetron Injectable 4 milliGRAM(s) IV Push every 8 hours PRN  oxyCODONE    Solution 5 milliGRAM(s) Oral every 6 hours PRN  oxyCODONE    Solution 2.5 milliGRAM(s) Oral every 6 hours PRN    Respiratory Medications:    Cardiovascular Medications:  labetalol 200 milliGRAM(s) Oral every 8 hours    Gastrointestinal Medications:  pantoprazole  Injectable 40 milliGRAM(s) IV Push every 24 hours    Genitourinary Medications:    Hematologic/Oncologic Medications:  enoxaparin Injectable 40 milliGRAM(s) SubCutaneous every 24 hours    Antimicrobials/Immunologic Medications:    Endocrine/Metabolic Medications:  finasteride 5 milliGRAM(s) Oral daily    Topical/Other Medications:  chlorhexidine 0.12% Liquid 15 milliLiter(s) Oral Mucosa two times a day      ALLERGIES:  latex (Rash)  No Known Drug Allergies    --------------------------------------------------------------------------------------    VITAL SIGNS:  ICU Vital Signs Last 24 Hrs  T(C): 36.1 (27 Oct 2024 20:00), Max: 36.6 (27 Oct 2024 08:00)  T(F): 97 (27 Oct 2024 20:00), Max: 97.8 (27 Oct 2024 08:00)  HR: 71 (27 Oct 2024 20:00) (64 - 77)  BP: 146/75 (27 Oct 2024 20:00) (116/66 - 146/75)  BP(mean): 94 (27 Oct 2024 20:00) (79 - 94)  ABP: --  ABP(mean): --  RR: 16 (27 Oct 2024 20:00) (15 - 17)  SpO2: 96% (27 Oct 2024 20:00) (95% - 97%)    O2 Parameters below as of 27 Oct 2024 20:00  Patient On (Oxygen Delivery Method): room air    --------------------------------------------------------------------------------------    INS AND OUTS:  I&O's Summary    26 Oct 2024 07:01  -  27 Oct 2024 07:00  --------------------------------------------------------  IN: 1913 mL / OUT: 1070 mL / NET: 843 mL    27 Oct 2024 07:01  -  28 Oct 2024 00:04  --------------------------------------------------------  IN: 1204 mL / OUT: 1265 mL / NET: -61 mL    --------------------------------------------------------------------------------------    EXAM  GEN: NAD, resting comfortably in bed   HEENT: Neck flat and supple, no collection. Incision closed with staples c/d/i  ACACIA L neck w/ SS output  Cook doppler with strong arterial signal. Intraoral skin paddle pink, well perfused. Incisions intact.  OC/OP: no erythema, bleeding, lacerations  RESP: Non-labored breathing, No wheezing / rhonchi  CARDIAC: S1S2, reg rate  ABD: soft, NT, ND  EXTREM: No edema, Donor site covered w/ ACE bandage, wound vac in place      METABOLIC / FLUIDS / ELECTROLYTES      HEMATOLOGIC  [x] VTE Prophylaxis: enoxaparin Injectable 40 milliGRAM(s) SubCutaneous every 24 hours    Transfusions:	[] PRBC	[] Platelets		[] FFP	[] Cryoprecipitate    INFECTIOUS DISEASE  Antimicrobials/Immunologic Medications:    Day #      of     ***    TUBES / LINES / DRAINS  ***  [x] Peripheral IV  [] Central Venous Line     	[] R	[] L	[] IJ	[] Fem	[] SC	Date Placed:   [] Arterial Line		[] R	[] L	[] Fem	[] Rad	[] Ax	Date Placed:   [] PICC		[] Midline		[] Mediport  [] Urinary Catheter		Date Placed:   [x] Necessity of urinary, arterial, and venous catheters discussed    --------------------------------------------------------------------------------------    LABS:                       --------------------------------------------------------------------------------------    OTHER LABORATORY:     IMAGING STUDIES:   CXR:      SICU Daily Progress Note  =====================================================  Interval/Overnight Events:       - q4h vascular checks  - listed for floor bed    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      Patient transferred to SICU post-operatively for q1h neurovascular checks.     --------------------------------------------------------------------------------------    MEDICATIONS:    Neurologic Medications:  acetaminophen   Oral Liquid .. 975 milliGRAM(s) Oral every 6 hours  gabapentin 600 milliGRAM(s) Oral daily  melatonin 3 milliGRAM(s) Oral at bedtime  ondansetron Injectable 4 milliGRAM(s) IV Push every 8 hours PRN  oxyCODONE    Solution 5 milliGRAM(s) Oral every 6 hours PRN  oxyCODONE    Solution 2.5 milliGRAM(s) Oral every 6 hours PRN    Respiratory Medications:    Cardiovascular Medications:  labetalol 200 milliGRAM(s) Oral every 8 hours    Gastrointestinal Medications:  pantoprazole  Injectable 40 milliGRAM(s) IV Push every 24 hours    Genitourinary Medications:    Hematologic/Oncologic Medications:  enoxaparin Injectable 40 milliGRAM(s) SubCutaneous every 24 hours    Antimicrobials/Immunologic Medications:    Endocrine/Metabolic Medications:  finasteride 5 milliGRAM(s) Oral daily    Topical/Other Medications:  chlorhexidine 0.12% Liquid 15 milliLiter(s) Oral Mucosa two times a day      ALLERGIES:  latex (Rash)  No Known Drug Allergies    --------------------------------------------------------------------------------------    VITAL SIGNS:  ICU Vital Signs Last 24 Hrs  T(C): 36.1 (27 Oct 2024 20:00), Max: 36.6 (27 Oct 2024 08:00)  T(F): 97 (27 Oct 2024 20:00), Max: 97.8 (27 Oct 2024 08:00)  HR: 71 (27 Oct 2024 20:00) (64 - 77)  BP: 146/75 (27 Oct 2024 20:00) (116/66 - 146/75)  BP(mean): 94 (27 Oct 2024 20:00) (79 - 94)  ABP: --  ABP(mean): --  RR: 16 (27 Oct 2024 20:00) (15 - 17)  SpO2: 96% (27 Oct 2024 20:00) (95% - 97%)    O2 Parameters below as of 27 Oct 2024 20:00  Patient On (Oxygen Delivery Method): room air    --------------------------------------------------------------------------------------    INS AND OUTS:  I&O's Summary    26 Oct 2024 07:01  -  27 Oct 2024 07:00  --------------------------------------------------------  IN: 1913 mL / OUT: 1070 mL / NET: 843 mL    27 Oct 2024 07:01  -  28 Oct 2024 00:04  --------------------------------------------------------  IN: 1204 mL / OUT: 1265 mL / NET: -61 mL    --------------------------------------------------------------------------------------    EXAM  GEN: NAD, resting comfortably in bed   HEENT: Neck flat and supple, no collection. Incision closed with staples c/d/i  ACACIA L neck w/ SS output  Cook doppler with strong arterial signal. Intraoral skin paddle pink, well perfused. Incisions intact.  OC/OP: no erythema, bleeding, lacerations  RESP: Non-labored breathing, No wheezing / rhonchi  CARDIAC: S1S2, reg rate  ABD: soft, NT, ND  EXTREM: No edema, Donor site covered w/ ACE bandage, wound vac in place      METABOLIC / FLUIDS / ELECTROLYTES      HEMATOLOGIC  [x] VTE Prophylaxis: enoxaparin Injectable 40 milliGRAM(s) SubCutaneous every 24 hours    Transfusions:	[] PRBC	[] Platelets		[] FFP	[] Cryoprecipitate    INFECTIOUS DISEASE  Antimicrobials/Immunologic Medications:    Day #      of     ***    TUBES / LINES / DRAINS  ***  [x] Peripheral IV  [] Central Venous Line     	[] R	[] L	[] IJ	[] Fem	[] SC	Date Placed:   [] Arterial Line		[] R	[] L	[] Fem	[] Rad	[] Ax	Date Placed:   [] PICC		[] Midline		[] Mediport  [] Urinary Catheter		Date Placed:   [x] Necessity of urinary, arterial, and venous catheters discussed    --------------------------------------------------------------------------------------    LABS:                                     10.1   8.99  )-----------( 154      ( 27 Oct 2024 00:48 )             31.3     10-27    145  |  110[H]  |  19  ----------------------------<  138[H]  3.8   |  22  |  0.72    Ca    8.1[L]      27 Oct 2024 00:48  Phos  1.9     10-27  Mg     2.20     10-27        --------------------------------------------------------------------------------------    OTHER LABORATORY:     IMAGING STUDIES:   CXR:

## 2024-10-28 NOTE — PROGRESS NOTE ADULT - ASSESSMENT
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 Remains HD stable and listed for transfer to surgical floor bed.     Neuro  - Q4 flap checks  - Tylenol, gabapentin, PRN oxy    Resp  - RA, NC PRN    CV  - SBP goal < 140  - Labetolol  q8    GI/Nutrition  - TF through KO tube  - Protonix qD    /Renal  - Passed TOV but retaining, continue to monitor  - Finasteride started 10/26    Heme  - Lovenox for DVT ppx    Endo  - Monitor serum glucose  - Decadron 8mg q8 for 24 hours completed    ID  - Unasyn post-op completed    Lines  - Ko tube  - pIV  - JPx1  - vacx1    Dispo  - SICU, listed

## 2024-10-28 NOTE — OCCUPATIONAL THERAPY INITIAL EVALUATION ADULT - NSOTDISCHREC_GEN_A_CORE
Recommend supervision/assist with functional activities which pt reports wife can provide./No skilled OT needs

## 2024-10-28 NOTE — OCCUPATIONAL THERAPY INITIAL EVALUATION ADULT - ADDITIONAL COMMENTS
Pt lives with his wife in a house with 1 steps to enter and a flight inside. Pt's bathroom has a tub with grab bars. Pt did not use an assistive device and was independent with ADLs prior. Pt reports no falls in the past 6 months.  Pt left sitting in bedside chair in NAD, all lines intact, call norman in reach and PEMA Lee made aware.

## 2024-10-28 NOTE — OCCUPATIONAL THERAPY INITIAL EVALUATION ADULT - GENERAL OBSERVATIONS, REHAB EVAL
Pt found seated in bedside chair +tube feed +tele +pulse ox +wound vac +ACACIA drain +wife present at bedside in NAD. Pt OK to be seen for OT per PEMA Koenig. SpO2 96% on RA.

## 2024-10-28 NOTE — PROGRESS NOTE ADULT - ASSESSMENT
AUSTYN LINTON is a  73yMale w/ SCC of left gingivolabial sulcus s/p L SND 1-3, excision of gingival lesion and marginal mandibulectomy, L RFFF w/ STSG from L thigh, transferred to SICU for flap monitoring, now transitioned to q4h checks per ERAS protocol    PLAN:  - Pain meds (Standing tylenol 975mg q6 hrs or 650mg q6 if <60kg, gabapentin 600mg qd if age <70 and no diuretic rx, opioids as needed)  - Flap checks  q1x24, q2x24, q4x72  - Baci 2x/d to neck incisions  - Lovenox   - c/w TFs  - s/p Unasyn x24h  - s/p Decadron 8mg x3 doses  - Peridex swish and spit BID  - pantoprazole 40 QD  - Bowel regimen (senna + miralax)  - Incentive spirometer  - f/u OT    Page ENT with any questions/concerns.  Peds Page #[id number]  Adult Page #[id number]

## 2024-10-28 NOTE — PROGRESS NOTE ADULT - SUBJECTIVE AND OBJECTIVE BOX
Plastic Surgery Progress Note (pg [facility]: [id number], [facility]: [phone])    SUBJECTIVE  The patient was seen and examined. No acute events overnight. Pain controlled, afebrile w/ stable vitals.     OBJECTIVE  ___________________________________________________  VITAL SIGNS / I&O's   Vital Signs Last 24 Hrs  T(C): 36.1 (28 Oct 2024 04:00), Max: 36.6 (27 Oct 2024 08:00)  T(F): 97 (28 Oct 2024 04:00), Max: 97.8 (27 Oct 2024 08:00)  HR: 75 (28 Oct 2024 05:00) (63 - 75)  BP: 135/67 (28 Oct 2024 05:00) (116/66 - 146/75)  BP(mean): 89 (28 Oct 2024 05:00) (75 - 94)  RR: 15 (28 Oct 2024 05:00) (11 - 17)  SpO2: 96% (28 Oct 2024 05:00) (95% - 98%)    Parameters below as of 28 Oct 2024 04:00  Patient On (Oxygen Delivery Method): room air          27 Oct 2024 07:01  -  28 Oct 2024 07:00  --------------------------------------------------------  IN:    Enteral Tube Flush: 970 mL    Pivot 1.5: 943 mL  Total IN: 1913 mL    OUT:    Drain (mL): 30 mL    VAC (Vacuum Assisted Closure) System (mL): 50 mL    Voided (mL): 1700 mL  Total OUT: 1780 mL    Total NET: 133 mL        ___________________________________________________  PHYSICAL EXAM    -- CONSTITUTIONAL: NAD, lying in bed  -- NEURO: Awake, alert  -- HEENT: NC/AT, mucous membranes moist. NGT. Cook signal strong and triphasic. JPx1 SS. Intraoral flap with medial ecchymosis - in line with maxillary teeth. Likely post-operative vs 2/2 biting insensate flap. No concern for congestion. flap with bright red bleeding on scratch  -- NECK: Soft, no collections, stable swelling from prior. Neck incision c/d/i  -- EXTREMITIES: L arm wound vac in place intact to suction.  -- PSYCH: Affect normal, A&Ox3  ___________________________________________________  LABS                        10.1   8.99  )-----------( 154      ( 27 Oct 2024 00:48 )             31.3     27 Oct 2024 00:48    145    |  110    |  19     ----------------------------<  138    3.8     |  22     |  0.72     Ca    8.1        27 Oct 2024 00:48  Phos  1.9       27 Oct 2024 00:48  Mg     2.20      27 Oct 2024 00:48        CAPILLARY BLOOD GLUCOSE            Urinalysis Basic - ( 27 Oct 2024 00:48 )    Color: x / Appearance: x / SG: x / pH: x  Gluc: 138 mg/dL / Ketone: x  / Bili: x / Urobili: x   Blood: x / Protein: x / Nitrite: x   Leuk Esterase: x / RBC: x / WBC x   Sq Epi: x / Non Sq Epi: x / Bacteria: x      ___________________________________________________  MICRO  Recent Cultures:    ___________________________________________________  MEDICATIONS  (STANDING):  acetaminophen   Oral Liquid .. 975 milliGRAM(s) Oral every 6 hours  chlorhexidine 0.12% Liquid 15 milliLiter(s) Oral Mucosa two times a day  enoxaparin Injectable 40 milliGRAM(s) SubCutaneous every 24 hours  finasteride 5 milliGRAM(s) Oral daily  gabapentin 600 milliGRAM(s) Oral daily  labetalol 200 milliGRAM(s) Oral every 8 hours  melatonin 3 milliGRAM(s) Oral at bedtime  pantoprazole  Injectable 40 milliGRAM(s) IV Push every 24 hours    MEDICATIONS  (PRN):  ondansetron Injectable 4 milliGRAM(s) IV Push every 8 hours PRN Nausea and/or Vomiting  oxyCODONE    Solution 5 milliGRAM(s) Oral every 6 hours PRN Severe Pain (7 - 10)  oxyCODONE    Solution 2.5 milliGRAM(s) Oral every 6 hours PRN Moderate Pain (4 - 6)

## 2024-10-28 NOTE — OCCUPATIONAL THERAPY INITIAL EVALUATION ADULT - TRANSFER SAFETY CONCERNS NOTED: SIT/STAND, REHAB EVAL
decreased balance during turns

## 2024-10-28 NOTE — PROGRESS NOTE ADULT - SUBJECTIVE AND OBJECTIVE BOX
OTOLARYNGOLOGY (ENT) PROGRESS NOTE    PATIENT: AUSTYN LINTON  MRN: [id number]  : 48  [id number]  DATE OF SERVICE:  10-28-24  			        Subjective/ Interval:   Subjective/ Interval: Patient seen and examined at bedside in the SICU. Transferred from OR to SICU w/o complications. NGT confirmed in place on CXR.   10/25: Patient seen and examined at bedside in SICU. Required labetalol x10mg overnight and cleviprex drip to control BP  10/26: Patient seen and examined at bedside. AFVSS, given hydral and labetalol x1 at 7-8pm, placed back on cleviprex drip x5-10mins and then taken off for whole night, SBP started to increase to 150s ~5a given hydral x1, was having small volume voiding ~100cc bladder scan ~300, was OOBTC  10/27: Pt seen and examined at bedside. A-line discontinued, transitioned to q4h checks. Finasteride initiated for urinary retention. Has been off cleviprex gtt.   10/28: Patient seen and examined at bedside. AFVSS. PT evaluated, no needs. Listed for step down.        MEDICATIONS  (STANDING):  acetaminophen   Oral Liquid .. 975 milliGRAM(s) Oral every 6 hours  chlorhexidine 0.12% Liquid 15 milliLiter(s) Oral Mucosa two times a day  enoxaparin Injectable 40 milliGRAM(s) SubCutaneous every 24 hours  finasteride 5 milliGRAM(s) Oral daily  gabapentin 600 milliGRAM(s) Oral daily  labetalol 200 milliGRAM(s) Oral every 8 hours  melatonin 3 milliGRAM(s) Oral at bedtime  pantoprazole  Injectable 40 milliGRAM(s) IV Push every 24 hours    MEDICATIONS  (PRN):  ondansetron Injectable 4 milliGRAM(s) IV Push every 8 hours PRN Nausea and/or Vomiting  oxyCODONE    Solution 2.5 milliGRAM(s) Oral every 6 hours PRN Moderate Pain (4 - 6)  oxyCODONE    Solution 5 milliGRAM(s) Oral every 6 hours PRN Severe Pain (7 - 10)        ICU Vital Signs Last 24 Hrs  T(C): 36.1 (28 Oct 2024 04:00), Max: 36.6 (27 Oct 2024 08:00)  T(F): 97 (28 Oct 2024 04:00), Max: 97.8 (27 Oct 2024 08:00)  HR: 75 (28 Oct 2024 05:00) (63 - 75)  BP: 135/67 (28 Oct 2024 05:00) (116/66 - 146/75)  BP(mean): 89 (28 Oct 2024 05:00) (75 - 94)  ABP: --  ABP(mean): --  RR: 15 (28 Oct 2024 05:00) (11 - 17)  SpO2: 96% (28 Oct 2024 05:00) (95% - 98%)    O2 Parameters below as of 28 Oct 2024 04:00  Patient On (Oxygen Delivery Method): room air                  PHYSICAL EXAM:   NAD, resting comfortably in bed   Neck flat and supple, no collection. Incision closed with staples c/d/i  ACACIA L neck w/ SS output  Cook doppler with strong arterial signal. flap with medial ecchymosis - in line with maxillary teeth. Likely post-operative vs 2/2 biting insensate flap. No concern for congestion. Incisions intact.  OC/OP: no erythema, bleeding, lacerations  Donor site covered w/ ACE bandage, wound vac in place

## 2024-10-28 NOTE — PROGRESS NOTE ADULT - ASSESSMENT
AUSTYN ROSSLEOBARDOBRITTON is a 73yMale s/p left FOM resection for SCC of left gingivolabial sulcus with left radial forearm free flap and STSG from left thigh.    - Flap checks per ERAS protocol with [employer] doppler  - Monitor ACACIA drain output  - Monitor wound vac output, will take down on POD7 10/31  - Skin graft donor site dressing down on POD7 10/31, reinforce PRN  - under ERAS protocol care  - Pain control  - Appreciate care per SICU    Plastic Surgery   [facility]: [id number]  [facility]: [phone]

## 2024-10-28 NOTE — OCCUPATIONAL THERAPY INITIAL EVALUATION ADULT - FINE MOTOR COORDINATION EXAM
bilateral upper extremity fine motor coordination was WFL. Pt with PMH of trigger finger in right upper extremity third digit./Left UE/Right UE

## 2024-10-29 LAB
ANION GAP SERPL CALC-SCNC: 10 MMOL/L — SIGNIFICANT CHANGE UP (ref 7–14)
APPEARANCE UR: CLEAR — SIGNIFICANT CHANGE UP
APTT BLD: 27.7 SEC — SIGNIFICANT CHANGE UP (ref 24.5–35.6)
BILIRUB UR-MCNC: NEGATIVE — SIGNIFICANT CHANGE UP
BUN SERPL-MCNC: 19 MG/DL — SIGNIFICANT CHANGE UP (ref 7–23)
C DIFF GDH STL QL: POSITIVE — SIGNIFICANT CHANGE UP
C DIFF GDH STL QL: SIGNIFICANT CHANGE UP
CALCIUM SERPL-MCNC: 8.3 MG/DL — LOW (ref 8.4–10.5)
CHLORIDE SERPL-SCNC: 104 MMOL/L — SIGNIFICANT CHANGE UP (ref 98–107)
CO2 SERPL-SCNC: 22 MMOL/L — SIGNIFICANT CHANGE UP (ref 22–31)
COLOR SPEC: SIGNIFICANT CHANGE UP
CREAT SERPL-MCNC: 0.92 MG/DL — SIGNIFICANT CHANGE UP (ref 0.5–1.3)
DIFF PNL FLD: NEGATIVE — SIGNIFICANT CHANGE UP
EGFR: 87 ML/MIN/1.73M2 — SIGNIFICANT CHANGE UP
GLUCOSE SERPL-MCNC: 120 MG/DL — HIGH (ref 70–99)
GLUCOSE UR QL: NEGATIVE MG/DL — SIGNIFICANT CHANGE UP
HCT VFR BLD CALC: 30.1 % — LOW (ref 39–50)
HGB BLD-MCNC: 9.7 G/DL — LOW (ref 13–17)
INR BLD: 0.96 RATIO — SIGNIFICANT CHANGE UP (ref 0.85–1.16)
KETONES UR-MCNC: ABNORMAL MG/DL
LEUKOCYTE ESTERASE UR-ACNC: NEGATIVE — SIGNIFICANT CHANGE UP
MAGNESIUM SERPL-MCNC: 1.9 MG/DL — SIGNIFICANT CHANGE UP (ref 1.6–2.6)
MCHC RBC-ENTMCNC: 29.7 PG — SIGNIFICANT CHANGE UP (ref 27–34)
MCHC RBC-ENTMCNC: 32.2 GM/DL — SIGNIFICANT CHANGE UP (ref 32–36)
MCV RBC AUTO: 92 FL — SIGNIFICANT CHANGE UP (ref 80–100)
NITRITE UR-MCNC: NEGATIVE — SIGNIFICANT CHANGE UP
NRBC # BLD: 0 /100 WBCS — SIGNIFICANT CHANGE UP (ref 0–0)
NRBC # FLD: 0 K/UL — SIGNIFICANT CHANGE UP (ref 0–0)
PH UR: 5.5 — SIGNIFICANT CHANGE UP (ref 5–8)
PHOSPHATE SERPL-MCNC: 3 MG/DL — SIGNIFICANT CHANGE UP (ref 2.5–4.5)
PLATELET # BLD AUTO: 166 K/UL — SIGNIFICANT CHANGE UP (ref 150–400)
POTASSIUM SERPL-MCNC: 4.1 MMOL/L — SIGNIFICANT CHANGE UP (ref 3.5–5.3)
POTASSIUM SERPL-SCNC: 4.1 MMOL/L — SIGNIFICANT CHANGE UP (ref 3.5–5.3)
PROT UR-MCNC: SIGNIFICANT CHANGE UP MG/DL
PROTHROM AB SERPL-ACNC: 11.4 SEC — SIGNIFICANT CHANGE UP (ref 9.9–13.4)
RBC # BLD: 3.27 M/UL — LOW (ref 4.2–5.8)
RBC # FLD: 15 % — HIGH (ref 10.3–14.5)
SODIUM SERPL-SCNC: 136 MMOL/L — SIGNIFICANT CHANGE UP (ref 135–145)
SP GR SPEC: 1.04 — HIGH (ref 1–1.03)
UROBILINOGEN FLD QL: 0.2 MG/DL — SIGNIFICANT CHANGE UP (ref 0.2–1)
WBC # BLD: 16.57 K/UL — HIGH (ref 3.8–10.5)
WBC # FLD AUTO: 16.57 K/UL — HIGH (ref 3.8–10.5)

## 2024-10-29 PROCEDURE — 99233 SBSQ HOSP IP/OBS HIGH 50: CPT

## 2024-10-29 RX ORDER — ACETAMINOPHEN 500 MG
975 TABLET ORAL EVERY 6 HOURS
Refills: 0 | Status: COMPLETED | OUTPATIENT
Start: 2024-10-29 | End: 2024-10-29

## 2024-10-29 RX ORDER — LABETALOL HCL 200 MG
100 TABLET ORAL ONCE
Refills: 0 | Status: COMPLETED | OUTPATIENT
Start: 2024-10-29 | End: 2024-10-29

## 2024-10-29 RX ORDER — FINASTERIDE 5 MG/1
5 TABLET, FILM COATED ORAL DAILY
Refills: 0 | Status: DISCONTINUED | OUTPATIENT
Start: 2024-10-29 | End: 2024-11-01

## 2024-10-29 RX ORDER — GABAPENTIN 300 MG/1
600 CAPSULE ORAL DAILY
Refills: 0 | Status: DISCONTINUED | OUTPATIENT
Start: 2024-10-30 | End: 2024-11-01

## 2024-10-29 RX ORDER — MELATONIN 5 MG
3 TABLET ORAL AT BEDTIME
Refills: 0 | Status: DISCONTINUED | OUTPATIENT
Start: 2024-10-29 | End: 2024-11-01

## 2024-10-29 RX ORDER — VANCOMYCIN HYDROCHLORIDE 50 MG/ML
125 KIT ORAL EVERY 6 HOURS
Refills: 0 | Status: DISCONTINUED | OUTPATIENT
Start: 2024-10-29 | End: 2024-11-01

## 2024-10-29 RX ORDER — B-COMPLEX WITH VITAMIN C
1 VIAL (ML) INJECTION DAILY
Refills: 0 | Status: DISCONTINUED | OUTPATIENT
Start: 2024-10-29 | End: 2024-11-01

## 2024-10-29 RX ORDER — PANTOPRAZOLE SODIUM 40 MG/1
40 TABLET, DELAYED RELEASE ORAL
Refills: 0 | Status: DISCONTINUED | OUTPATIENT
Start: 2024-10-29 | End: 2024-11-01

## 2024-10-29 RX ADMIN — Medication 975 MILLIGRAM(S): at 12:40

## 2024-10-29 RX ADMIN — FINASTERIDE 5 MILLIGRAM(S): 5 TABLET, FILM COATED ORAL at 12:21

## 2024-10-29 RX ADMIN — Medication 40 MILLIGRAM(S): at 05:32

## 2024-10-29 RX ADMIN — Medication 100 MILLIGRAM(S): at 05:33

## 2024-10-29 RX ADMIN — CHLORHEXIDINE GLUCONATE 15 MILLILITER(S): 40 SOLUTION TOPICAL at 16:49

## 2024-10-29 RX ADMIN — Medication 975 MILLIGRAM(S): at 05:33

## 2024-10-29 RX ADMIN — Medication 975 MILLIGRAM(S): at 12:20

## 2024-10-29 RX ADMIN — CHLORHEXIDINE GLUCONATE 15 MILLILITER(S): 40 SOLUTION TOPICAL at 05:53

## 2024-10-29 RX ADMIN — GABAPENTIN 600 MILLIGRAM(S): 300 CAPSULE ORAL at 12:21

## 2024-10-29 RX ADMIN — VANCOMYCIN HYDROCHLORIDE 125 MILLIGRAM(S): KIT at 12:22

## 2024-10-29 RX ADMIN — Medication 1 TABLET(S): at 12:21

## 2024-10-29 RX ADMIN — VANCOMYCIN HYDROCHLORIDE 125 MILLIGRAM(S): KIT at 05:33

## 2024-10-29 RX ADMIN — VANCOMYCIN HYDROCHLORIDE 125 MILLIGRAM(S): KIT at 16:48

## 2024-10-29 NOTE — PROGRESS NOTE ADULT - ASSESSMENT
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 Remains HD stable and listed for transfer to surgical floor bed.     Neuro  - Q4 flap checks  - Tylenol, gabapentin, PRN oxy    Resp  - SPO2 > 92% on RA    CV  - SBP goal < 140  - Labetalol  q8    GI/Nutrition  - TF through KO tube  - Protonix qD  - F/u stool cultures, C. 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 Remains HD stable and listed for transfer to surgical floor bed.     Neuro  - Q4 flap checks  - Tylenol, gabapentin, PRN oxy    Resp  - SPO2 > 92% on RA    CV  - SBP goal < 140  - Labetalol  q8    GI/Nutrition  - TF through KO tube  - Protonix qD  - C. Diff positive  - Contact precautions    /Renal  - Finasteride started 10/26    Heme  - Lovenox for DVT ppx    Endo  - Monitor serum glucose  - Decadron 8mg q8 for 24 hours completed    ID  - Unasyn post-op completed  - PO vancomycin    Lines  - Ko tube  - pIV  - JPx1  - vacx1    Dispo  - SICU, listed

## 2024-10-29 NOTE — PROGRESS NOTE ADULT - SUBJECTIVE AND OBJECTIVE BOX
Plastic Surgery Progress Note (pg [facility]: [id number], [facility]: [phone])    SUBJECTIVE  The patient was seen and examined. No acute events overnight. Pain controlled. Febrile to 39.6, c diff+.     OBJECTIVE  ___________________________________________________  VITAL SIGNS / I&O's   Vital Signs Last 24 Hrs  T(C): 37.3 (29 Oct 2024 08:00), Max: 39.6 (28 Oct 2024 20:00)  T(F): 99.2 (29 Oct 2024 08:00), Max: 103.2 (28 Oct 2024 20:00)  HR: 73 (29 Oct 2024 09:00) (68 - 103)  BP: 104/52 (29 Oct 2024 09:00) (102/52 - 137/49)  BP(mean): 68 (29 Oct 2024 09:) (63 - 94)  RR: 16 (29 Oct 2024 09:00) (13 - 24)  SpO2: 95% (29 Oct 2024 09:00) (94% - 100%)    Parameters below as of 29 Oct 2024 09:00  Patient On (Oxygen Delivery Method): room air          28 Oct 2024 07:01  -  29 Oct 2024 07:00  --------------------------------------------------------  IN:    Enteral Tube Flush: 490 mL    Free Water: 625 mL    Jevity 1.5: 879 mL    Oral Fluid: 470 mL    Pivot 1.5: 164 mL  Total IN: 2628 mL    OUT:    Drain (mL): 29 mL    VAC (Vacuum Assisted Closure) System (mL): 20 mL    Voided (mL): 1370 mL  Total OUT: 1419 mL    Total NET: 1209 mL      29 Oct 2024 07:01  -  29 Oct 2024 09:30  --------------------------------------------------------  IN:  Total IN: 0 mL    OUT:    Voided (mL): 150 mL  Total OUT: 150 mL    Total NET: -150 mL        ___________________________________________________  PHYSICAL EXAM    -- CONSTITUTIONAL: NAD, lying in bed  -- NEURO: Awake, alert  -- HEENT: NC/AT, mucous membranes moist. NGT. Cook signal strong and triphasic. JPx1 SS. Intraoral flap with medial ecchymosis - in line with maxillary teeth. Likely post-operative vs 2/2 biting insensate flap. No concern for congestion. flap with bright red bleeding on scratch  -- NECK: Soft, no collections, stable swelling from prior. Submental ecchymosis. Neck incision c/d/i  -- EXTREMITIES: L arm wound vac in place intact to suction.  -- PSYCH: Affect normal, A&Ox3    ___________________________________________________  LABS                        9.7    16.57 )-----------( 166      ( 29 Oct 2024 01:00 )             30.1     29 Oct 2024 01:00    136    |  104    |  19     ----------------------------<  120    4.1     |  22     |  0.92     Ca    8.3        29 Oct 2024 01:00  Phos  3.0       29 Oct 2024 01:00  Mg     1.90      29 Oct 2024 01:00      PT/INR - ( 29 Oct 2024 01:00 )   PT: 11.4 sec;   INR: 0.96 ratio         PTT - ( 29 Oct 2024 01:00 )  PTT:27.7 sec  CAPILLARY BLOOD GLUCOSE            Urinalysis Basic - ( 29 Oct 2024 01:00 )    Color: Dark Yellow / Appearance: Clear / S.036 / pH: x  Gluc: 120 mg/dL / Ketone: Trace mg/dL  / Bili: Negative / Urobili: 0.2 mg/dL   Blood: x / Protein: Trace mg/dL / Nitrite: Negative   Leuk Esterase: Negative / RBC: x / WBC x   Sq Epi: x / Non Sq Epi: x / Bacteria: x      ___________________________________________________  MICRO  Recent Cultures:    ___________________________________________________  MEDICATIONS  (STANDING):  acetaminophen     Tablet .. 975 milliGRAM(s) Oral every 6 hours  chlorhexidine 0.12% Liquid 15 milliLiter(s) Oral Mucosa two times a day  enoxaparin Injectable 40 milliGRAM(s) SubCutaneous every 24 hours  finasteride 5 milliGRAM(s) Oral daily  gabapentin 600 milliGRAM(s) Oral daily  labetalol 200 milliGRAM(s) Oral every 8 hours  melatonin 3 milliGRAM(s) Oral at bedtime  multivitamin 1 Tablet(s) Oral daily  pantoprazole    Tablet 40 milliGRAM(s) Oral before breakfast  vancomycin    Solution 125 milliGRAM(s) Oral every 6 hours    MEDICATIONS  (PRN):

## 2024-10-29 NOTE — PROGRESS NOTE ADULT - ASSESSMENT
AUSTYN ROSSLEOBARDOBRITTON is a 73yMale s/p left FOM resection for SCC of left gingivolabial sulcus with left radial forearm free flap and STSG from left thigh.    - Flap checks per ERAS protocol with [place] doppler  - Monitor ACACIA drain output  - Monitor wound vac output, will take down on POD7 10/31  - Skin graft donor site dressing down on POD7 10/31, reinforce PRN  - under ERAS protocol care  - Pain control  - Appreciate care per SICU    Plastic Surgery   [facility]: [id number]  [facility]: [phone]

## 2024-10-29 NOTE — PROGRESS NOTE ADULT - SUBJECTIVE AND OBJECTIVE BOX
OTOLARYNGOLOGY (ENT) PROGRESS NOTE    PATIENT: AUSTYN LINTON  MRN: [id number]  : 48  [id number]  DATE OF SERVICE:  10-29-24  			        Subjective/ Interval:   Subjective/ Interval: Patient seen and examined at bedside in the SICU. Transferred from OR to SICU w/o complications. NGT confirmed in place on CXR.   10/25: Patient seen and examined at bedside in SICU. Required labetalol x10mg overnight and cleviprex drip to control BP  10/26: Patient seen and examined at bedside. AFVSS, given hydral and labetalol x1 at 7-8pm, placed back on cleviprex drip x5-10mins and then taken off for whole night, SBP started to increase to 150s ~5a given hydral x1, was having small volume voiding ~100cc bladder scan ~300, was OOBTC  10/27: Pt seen and examined at bedside. A-line discontinued, transitioned to q4h checks. Finasteride initiated for urinary retention. Has been off cleviprex gtt.   10/28: Patient seen and examined at bedside. AFVSS. PT evaluated, no needs. Listed for step down.  10/29: Patient seen and examined at bedside. Febrile to 103.2F  and cdiff positive. WBC 16.57        MEDICATIONS  (STANDING):  acetaminophen   Oral Liquid .. 975 milliGRAM(s) Oral every 6 hours  chlorhexidine 0.12% Liquid 15 milliLiter(s) Oral Mucosa two times a day  enoxaparin Injectable 40 milliGRAM(s) SubCutaneous every 24 hours  finasteride 5 milliGRAM(s) Oral daily  gabapentin 600 milliGRAM(s) Oral daily  labetalol 200 milliGRAM(s) Oral every 8 hours  labetalol 100 milliGRAM(s) Oral once  melatonin 3 milliGRAM(s) Oral at bedtime  pantoprazole  Injectable 40 milliGRAM(s) IV Push every 24 hours  vancomycin    Solution 125 milliGRAM(s) Oral every 6 hours    MEDICATIONS  (PRN):  ondansetron Injectable 4 milliGRAM(s) IV Push every 8 hours PRN Nausea and/or Vomiting  oxyCODONE    Solution 5 milliGRAM(s) Oral every 6 hours PRN Severe Pain (7 - 10)  oxyCODONE    Solution 2.5 milliGRAM(s) Oral every 6 hours PRN Moderate Pain (4 - 6)        ICU Vital Signs Last 24 Hrs  T(C): 38.5 (29 Oct 2024 00:00), Max: 39.6 (28 Oct 2024 20:00)  T(F): 101.3 (29 Oct 2024 00:00), Max: 103.2 (28 Oct 2024 20:00)  HR: 80 (29 Oct 2024 05:00) (64 - 103)  BP: 118/53 (29 Oct 2024 05:00) (104/46 - 137/49)  BP(mean): 70 (29 Oct 2024 05:00) (63 - 94)  ABP: --  ABP(mean): --  RR: 16 (29 Oct 2024 05:00) (14 - 24)  SpO2: 97% (29 Oct 2024 05:00) (94% - 100%)    O2 Parameters below as of 28 Oct 2024 20:00  Patient On (Oxygen Delivery Method): room air                        PHYSICAL EXAM:   NAD, resting comfortably in bed   Neck flat and supple, no collection. Incision closed with staples c/d/i  ACACIA L neck w/ SS output  Cook doppler with strong arterial signal. flap with medial ecchymosis - in line with maxillary teeth. Likely post-operative vs 2/2 biting insensate flap. No concern for congestion. Incisions intact.  OC/OP: no erythema, bleeding, lacerations  Donor site covered w/ ACE bandage, wound vac in place

## 2024-10-29 NOTE — PROGRESS NOTE ADULT - SUBJECTIVE AND OBJECTIVE BOX
SICU Daily Progress Note  =====================================================  Interval/Overnight Events:       - Q4 neurovascular checks  - Febrile to 103.4 overnight -> f/u CXR, UA, blood cx  - Diarrhea i/s/o fevers --> stool cx, c. diff studies, contact precautions  - WBC increased 6 --> 15    ALLERGIES:  latex (Rash)  No Known Drug Allergies      --------------------------------------------------------------------------------------    MEDICATIONS:    Neurologic Medications  acetaminophen   Oral Liquid .. 975 milliGRAM(s) Oral every 6 hours  gabapentin 600 milliGRAM(s) Oral daily  melatonin 3 milliGRAM(s) Oral at bedtime  ondansetron Injectable 4 milliGRAM(s) IV Push every 8 hours PRN Nausea and/or Vomiting  oxyCODONE    Solution 5 milliGRAM(s) Oral every 6 hours PRN Severe Pain (7 - 10)  oxyCODONE    Solution 2.5 milliGRAM(s) Oral every 6 hours PRN Moderate Pain (4 - 6)    Respiratory Medications    Cardiovascular Medications  labetalol 200 milliGRAM(s) Oral every 8 hours    Gastrointestinal Medications  pantoprazole  Injectable 40 milliGRAM(s) IV Push every 24 hours  polyethylene glycol 3350 17 Gram(s) Oral daily PRN Constipation  senna Syrup 10 milliLiter(s) Oral at bedtime PRN Constipation    Genitourinary Medications    Hematologic/Oncologic Medications  enoxaparin Injectable 40 milliGRAM(s) SubCutaneous every 24 hours    Antimicrobial/Immunologic Medications    Endocrine/Metabolic Medications  finasteride 5 milliGRAM(s) Oral daily    Topical/Other Medications  chlorhexidine 0.12% Liquid 15 milliLiter(s) Oral Mucosa two times a day    --------------------------------------------------------------------------------------    VITAL SIGNS:  T(C): 39.6 (10-28-24 @ 20:00), Max: 39.6 (10-28-24 @ 20:00)  HR: 103 (10-28-24 @ 22:00) (63 - 103)  BP: 114/52 (10-28-24 @ 22:00) (109/74 - 137/49)  RR: 17 (10-28-24 @ 22:00) (11 - 24)  SpO2: 100% (10-28-24 @ 22:00) (96% - 100%)  --------------------------------------------------------------------------------------    INS AND OUTS:    10-27-24 @ 07:01  -  10-28-24 @ 07:00  --------------------------------------------------------  IN: 1954 mL / OUT: 1780 mL / NET: 174 mL    10-28-24 @ 07:01  -  10-29-24 @ 00:29  --------------------------------------------------------  IN: 1781 mL / OUT: 1012 mL / NET: 769 mL      --------------------------------------------------------------------------------------    EXAM    NEURO: NAD,   HEENT: NC/AT, Neck flat and supple, no collection. Incision closed with imya c/d/i, ACACIA L neck w/ SS output, Cook doppler with strong arterial signal. Intraoral skin paddle pink, well perfused.   OC/OP: no erythema, bleeding, lacerations  RESPIRATORY: nonlabored respirations, normal CW expansion  CARDIO: S1S2, tachycardia  ABDOMEN: soft, nontender, nondistended  EXTREMITIES: normal strength, no deformities, Donor site covered w/ ACE bandage, wound vac in place    --------------------------------------------------------------------------------------    LABS    CBC (10-28 @ 22:11)                          10.2[L]                   15.21[H]  )--------------(  172        --    % Neuts, --    % Lymphs, ANC: --                              31.4[L]  CBC (10-28 @ 05:36)                          9.6[L]                   6.49    )--------------(  166        --    % Neuts, --    % Lymphs, ANC: --                              31.6[L]    BMP (10-28 @ 05:36)       143     |  106     |  16    			Ca++ --      Ca 8.2[L]       ---------------------------------( 129[H]		Mg 2.10         4.1     |  24      |  0.61  			Ph 2.6                 Urinalysis (10-28 @ 05:36):     Color:  / Appearance:  / SG:  / pH:  / Gluc: 129[H] / Ketones:  / Bili:  / Urobili:  / Protein : / Nitrites:  / Leuk.Est:  / RBC:  / WBC:  / Sq Epi:  / Non Sq Epi:  / Bacteria      Urinalysis (10-27 @ 00:48):     Color:  / Appearance:  / SG:  / pH:  / Gluc: 138[H] / Ketones:  / Bili:  / Urobili:  / Protein : / Nitrites:  / Leuk.Est:  / RBC:  / WBC:  / Sq Epi:  / Non Sq Epi:  / Bacteria          -------------------------------------------------------------------------------------- SICU Daily Progress Note  =====================================================  Interval/Overnight Events:       - Q4 neurovascular checks  - Febrile to 103.4 overnight -> f/u CXR, UA, blood cx  - UA negative  - Diarrhea i/s/o fevers --> C. Diff positive  - PO vanc, contact precautions  - WBC increased 6 --> 15    ALLERGIES:  latex (Rash)  No Known Drug Allergies      --------------------------------------------------------------------------------------    MEDICATIONS:    Neurologic Medications  acetaminophen   Oral Liquid .. 975 milliGRAM(s) Oral every 6 hours  gabapentin 600 milliGRAM(s) Oral daily  melatonin 3 milliGRAM(s) Oral at bedtime  ondansetron Injectable 4 milliGRAM(s) IV Push every 8 hours PRN Nausea and/or Vomiting  oxyCODONE    Solution 5 milliGRAM(s) Oral every 6 hours PRN Severe Pain (7 - 10)  oxyCODONE    Solution 2.5 milliGRAM(s) Oral every 6 hours PRN Moderate Pain (4 - 6)    Respiratory Medications    Cardiovascular Medications  labetalol 200 milliGRAM(s) Oral every 8 hours    Gastrointestinal Medications  pantoprazole  Injectable 40 milliGRAM(s) IV Push every 24 hours  polyethylene glycol 3350 17 Gram(s) Oral daily PRN Constipation  senna Syrup 10 milliLiter(s) Oral at bedtime PRN Constipation    Genitourinary Medications    Hematologic/Oncologic Medications  enoxaparin Injectable 40 milliGRAM(s) SubCutaneous every 24 hours    Antimicrobial/Immunologic Medications    Endocrine/Metabolic Medications  finasteride 5 milliGRAM(s) Oral daily    Topical/Other Medications  chlorhexidine 0.12% Liquid 15 milliLiter(s) Oral Mucosa two times a day    --------------------------------------------------------------------------------------    VITAL SIGNS:  T(C): 39.6 (10-28-24 @ 20:00), Max: 39.6 (10-28-24 @ 20:00)  HR: 103 (10-28-24 @ 22:00) (63 - 103)  BP: 114/52 (10-28-24 @ 22:00) (109/74 - 137/49)  RR: 17 (10-28-24 @ 22:00) (11 - 24)  SpO2: 100% (10-28-24 @ 22:00) (96% - 100%)  --------------------------------------------------------------------------------------    INS AND OUTS:    10-27-24 @ 07:01  -  10-28-24 @ 07:00  --------------------------------------------------------  IN: 1954 mL / OUT: 1780 mL / NET: 174 mL    10-28-24 @ 07:01  -  10-29-24 @ 00:29  --------------------------------------------------------  IN: 1781 mL / OUT: 1012 mL / NET: 769 mL      --------------------------------------------------------------------------------------    EXAM    NEURO: NAD,   HEENT: NC/AT, Neck flat and supple, no collection. Incision closed with miya c/d/i, ACACAI L neck w/ SS output, [employer] doppler with strong arterial signal. Intraoral skin paddle pink, well perfused.   OC/OP: no erythema, bleeding, lacerations  RESPIRATORY: nonlabored respirations, normal CW expansion  CARDIO: S1S2, tachycardia  ABDOMEN: soft, nontender, nondistended  EXTREMITIES: normal strength, no deformities, Donor site covered w/ ACE bandage, wound vac in place    --------------------------------------------------------------------------------------    LABS    CBC (10-28 @ 22:11)                          10.2[L]                   15.21[H]  )--------------(  172        --    % Neuts, --    % Lymphs, ANC: --                              31.4[L]  CBC (10-28 @ 05:36)                          9.6[L]                   6.49    )--------------(  166        --    % Neuts, --    % Lymphs, ANC: --                              31.6[L]    BMP (10-28 @ 05:36)       143     |  106     |  16    			Ca++ --      Ca 8.2[L]       ---------------------------------( 129[H]		Mg 2.10         4.1     |  24      |  0.61  			Ph 2.6                 Urinalysis (10-28 @ 05:36):     Color:  / Appearance:  / SG:  / pH:  / Gluc: 129[H] / Ketones:  / Bili:  / Urobili:  / Protein : / Nitrites:  / Leuk.Est:  / RBC:  / WBC:  / Sq Epi:  / Non Sq Epi:  / Bacteria      Urinalysis (10-27 @ 00:48):     Color:  / Appearance:  / SG:  / pH:  / Gluc: 138[H] / Ketones:  / Bili:  / Urobili:  / Protein : / Nitrites:  / Leuk.Est:  / RBC:  / WBC:  / Sq Epi:  / Non Sq Epi:  / Bacteria          --------------------------------------------------------------------------------------

## 2024-10-29 NOTE — PROGRESS NOTE ADULT - ATTENDING COMMENTS
I agree with the detailed interval history, physical, and plan, which I have reviewed and edited where appropriate'; also agree with notes/assessment with my team on service.  I have personally examined the patient.  I was physically present for the key portions of the evaluation and management (E/M) service provided.  I reviewed all the pertinent data.  The patient is a critical care patient with life threatening hemodynamic and metabolic instability in SICU.  The SICU team has a constant risk benefit analyzes discussion and coordinating care with the primary team and all consultants.   The patient is in SICU with the chief complaint and diagnosis mentioned in the note.   The plan will be specified in the note.  73M s/p left neck dissections level 1-3 and excision of lesion of left gingovobuccal sulcus with sacrifice of mental nerve, marginal mandibulectomy with reconstruction using LFFF in SICU for HD monitoring.  EXAM  GEN: NAD  HEENT: Neck flat and supple  Cook doppler   RESP: clear  CARDIAC: RR  ABD: soft, NT, ND  EXTREM: No edema    PLAN  Neuro  - Q4 flap checks  - Tylenol  -gabapentin  Resp  - RA  CV  - Labetolol   GI/Nutrition  - Protonix qD  /Renal  - Finasteride started 10/26  Heme  - Lovenox   Endo  - Decadron   ID  - FU WBC  Dispo  - DC
Patient overnight no issues, q4 hr flap checks, on po labetalol. Floor eligible, on room air, hemodynamically stable.    I have personally interviewed when possible and examined the patient, reviewed data and laboratory tests/x-rays and all pertinent electronic images.  I was physically present for the key portions of the evaluation and management (E/M) service provided.   The SICU team has a constant risk benefit analyzes discussion with the primary team, all consultants, House Staff and PA's on all decisions.  These diagnoses are unrelated to the surgical procedure noted above.  I meet with family if needed to get further history, discuss the case and make care decisions for this patient who might not be able to participate.  Time involved in performance of separately billable procedures was not counted toward my critical care time. There is no overlap.  I spent 30 minutes ( 0800Hrs-0915Hrs in AM/ 1600Hrs-1715Hrs in PM, or other time indicated) of critical care time for the diagnoses, assessment, plan and interventions.  This time excludes time spent on separate procedures and teaching.
Patient overnight no issues, q2 hr to q4 flap checks, on po labetalol. Floor eligible, on room air, hemodynamically stable.    I have personally interviewed when possible and examined the patient, reviewed data and laboratory tests/x-rays and all pertinent electronic images.  I was physically present for the key portions of the evaluation and management (E/M) service provided.   The SICU team has a constant risk benefit analyzes discussion with the primary team, all consultants, House Staff and PA's on all decisions.  These diagnoses are unrelated to the surgical procedure noted above.  I meet with family if needed to get further history, discuss the case and make care decisions for this patient who might not be able to participate.  Time involved in performance of separately billable procedures was not counted toward my critical care time. There is no overlap.  I spent 30 minutes ( 0800Hrs-0915Hrs in AM/ 1600Hrs-1715Hrs in PM, or other time indicated) of critical care time for the diagnoses, assessment, plan and interventions.  This time excludes time spent on separate procedures and teaching.
I agree with the detailed interval history, physical, and plan, which I have reviewed and edited where appropriate'; also agree with notes/assessment with my team on service.  I have personally examined the patient.  I was physically present for the key portions of the evaluation and management (E/M) service provided.  I reviewed all the pertinent data.  The patient is a critical care patient with life threatening hemodynamic and metabolic instability in SICU.  The SICU team has a constant risk benefit analyzes discussion and coordinating care with the primary team and all consultants.   The patient is in SICU with the chief complaint and diagnosis mentioned in the note.   The plan will be specified in the note.  73M s/p left neck dissections level 1-3 and excision of lesion of left gingovobuccal sulcus with sacrifice of mental nerve, marginal mandibulectomy with reconstruction using LFFF in SICU for HD monitoring with C Diff+.  EXAM  GEN: Ambulating  HEENT: Neck flat and supple  Cook doppler   RESP: clear  CARDIAC: RR  ABD: soft, NT, ND  EXTREM: No edema    PLAN  Neuro  - Q4 flap checks  - Tylenol  -gabapentin  Resp  - RA  CV  - Labetolol   GI/Nutrition  - Protonix qD  /Renal  - Finasteride started 10/26  Heme  - Lovenox   Endo  - FU glucose  ID  - Vanco  Dispo  - DC
Patient pod1 from mandibulectomy and free flap requiring sicu for q1 hr flap checks. Patient hypertensive, requiring clevidipine gtt  N pain controlled, multimodal pain therapy  resp on ra, saturating well  cv clevidipine, required labetalol overnight, increase labetalol, wean off clevidipine  gi npo, ivf start tf  gu/renal adequate uop, d/c lebron  heme vte ppx  id d/cc unasyn  endo no changes    The patient is a critical care patient with life threatening hemodynamic and metabolic instability in SICU.  I have personally interviewed when possible and examined the patient, reviewed data and laboratory tests/x-rays and all pertinent electronic images.  I was physically present for the key portions of the evaluation and management (E/M) service provided.   The SICU team has a constant risk benefit analyzes discussion with the primary team, all consultants, House Staff and PA's on all decisions.  These diagnoses are unrelated to the surgical procedure noted above.  I meet with family if needed to get further history, discuss the case and make care decisions for this patient who might not be able to participate.  Time involved in performance of separately billable procedures was not counted toward my critical care time. There is no overlap.  I spent 55-75 minutes ( 0800Hrs-0915Hrs in AM/ 1600Hrs-1715Hrs in PM, or other time indicated) of critical care time for the diagnoses, assessment, plan and interventions.  This time excludes time spent on separate procedures and teaching.

## 2024-10-29 NOTE — PROGRESS NOTE ADULT - ASSESSMENT
AUSTYN LINTON is a  73yMale w/ SCC of left gingivolabial sulcus s/p L SND 1-3, excision of gingival lesion and marginal mandibulectomy, L RFFF w/ STSG from L thigh, transferred to SICU for flap monitoring, now transitioned to q4h checks per ERAS protocol    PLAN:  - Pain meds (Standing tylenol 975mg q6 hrs or 650mg q6 if <60kg, gabapentin 600mg qd if age <70 and no diuretic rx, opioids as needed)  -Continue vanc for cdiff.  - Flap checks  q1x24, q2x24, q4x72  - Baci 2x/d to neck incisions  - Lovenox   - c/w TFs  - s/p Unasyn x24h  - s/p Decadron 8mg x3 doses  - Peridex swish and spit BID  - pantoprazole 40 QD  - Bowel regimen (senna + miralax)  - Incentive spirometer  - f/u OT    Page ENT with any questions/concerns.  Peds Page #[id number]  Adult Page #[phone] AUSTYN LINTON is a  73yMale w/ SCC of left gingivolabial sulcus s/p L SND 1-3, excision of gingival lesion and marginal mandibulectomy, L RFFF w/ STSG from L thigh, transferred to SICU for flap monitoring, now transitioned to q4h checks per ERAS protocol    PLAN:  - Pain meds (Standing tylenol 975mg q6 hrs or 650mg q6 if <60kg, gabapentin 600mg qd if age <70 and no diuretic rx, opioids as needed)  -Transition to FLD  -Continue vanc for cdiff.  - Flap checks  q1x24, q2x24, q4x72  - Baci 2x/d to neck incisions  - Lovenox   - c/w TFs  - s/p Unasyn x24h  - s/p Decadron 8mg x3 doses  - Peridex swish and spit BID  - pantoprazole 40 QD  - Bowel regimen (senna + miralax)  - Incentive spirometer  - f/u OT    Page ENT with any questions/concerns.  Peds Page #[id number]  Adult Page #[phone]

## 2024-10-30 ENCOUNTER — TRANSCRIPTION ENCOUNTER (OUTPATIENT)
Age: 74
End: 2024-10-30

## 2024-10-30 LAB
ANION GAP SERPL CALC-SCNC: 10 MMOL/L — SIGNIFICANT CHANGE UP (ref 7–14)
BASOPHILS # BLD AUTO: 0.08 K/UL — SIGNIFICANT CHANGE UP (ref 0–0.2)
BASOPHILS NFR BLD AUTO: 0.4 % — SIGNIFICANT CHANGE UP (ref 0–2)
BUN SERPL-MCNC: 13 MG/DL — SIGNIFICANT CHANGE UP (ref 7–23)
CALCIUM SERPL-MCNC: 8.7 MG/DL — SIGNIFICANT CHANGE UP (ref 8.4–10.5)
CHLORIDE SERPL-SCNC: 106 MMOL/L — SIGNIFICANT CHANGE UP (ref 98–107)
CO2 SERPL-SCNC: 24 MMOL/L — SIGNIFICANT CHANGE UP (ref 22–31)
CREAT SERPL-MCNC: 0.74 MG/DL — SIGNIFICANT CHANGE UP (ref 0.5–1.3)
EGFR: 95 ML/MIN/1.73M2 — SIGNIFICANT CHANGE UP
EOSINOPHIL # BLD AUTO: 0.21 K/UL — SIGNIFICANT CHANGE UP (ref 0–0.5)
EOSINOPHIL NFR BLD AUTO: 1.1 % — SIGNIFICANT CHANGE UP (ref 0–6)
GLUCOSE SERPL-MCNC: 101 MG/DL — HIGH (ref 70–99)
HCT VFR BLD CALC: 32.4 % — LOW (ref 39–50)
HGB BLD-MCNC: 10.3 G/DL — LOW (ref 13–17)
IANC: 15.36 K/UL — HIGH (ref 1.8–7.4)
IMM GRANULOCYTES NFR BLD AUTO: 1 % — HIGH (ref 0–0.9)
LYMPHOCYTES # BLD AUTO: 1.62 K/UL — SIGNIFICANT CHANGE UP (ref 1–3.3)
LYMPHOCYTES # BLD AUTO: 8.8 % — LOW (ref 13–44)
MAGNESIUM SERPL-MCNC: 2.3 MG/DL — SIGNIFICANT CHANGE UP (ref 1.6–2.6)
MCHC RBC-ENTMCNC: 29.5 PG — SIGNIFICANT CHANGE UP (ref 27–34)
MCHC RBC-ENTMCNC: 31.8 G/DL — LOW (ref 32–36)
MCV RBC AUTO: 92.8 FL — SIGNIFICANT CHANGE UP (ref 80–100)
MONOCYTES # BLD AUTO: 1.04 K/UL — HIGH (ref 0–0.9)
MONOCYTES NFR BLD AUTO: 5.6 % — SIGNIFICANT CHANGE UP (ref 2–14)
NEUTROPHILS # BLD AUTO: 15.36 K/UL — HIGH (ref 1.8–7.4)
NEUTROPHILS NFR BLD AUTO: 83.1 % — HIGH (ref 43–77)
NRBC # BLD: 0 /100 WBCS — SIGNIFICANT CHANGE UP (ref 0–0)
NRBC # FLD: 0 K/UL — SIGNIFICANT CHANGE UP (ref 0–0)
PHOSPHATE SERPL-MCNC: 3 MG/DL — SIGNIFICANT CHANGE UP (ref 2.5–4.5)
PLATELET # BLD AUTO: 204 K/UL — SIGNIFICANT CHANGE UP (ref 150–400)
POTASSIUM SERPL-MCNC: 3.9 MMOL/L — SIGNIFICANT CHANGE UP (ref 3.5–5.3)
POTASSIUM SERPL-SCNC: 3.9 MMOL/L — SIGNIFICANT CHANGE UP (ref 3.5–5.3)
RBC # BLD: 3.49 M/UL — LOW (ref 4.2–5.8)
RBC # FLD: 15.5 % — HIGH (ref 10.3–14.5)
SODIUM SERPL-SCNC: 140 MMOL/L — SIGNIFICANT CHANGE UP (ref 135–145)
WBC # BLD: 18.49 K/UL — HIGH (ref 3.8–10.5)
WBC # FLD AUTO: 18.49 K/UL — HIGH (ref 3.8–10.5)

## 2024-10-30 PROCEDURE — 99223 1ST HOSP IP/OBS HIGH 75: CPT

## 2024-10-30 RX ORDER — CHLORHEXIDINE GLUCONATE 40 MG/ML
1 SOLUTION TOPICAL DAILY
Refills: 0 | Status: DISCONTINUED | OUTPATIENT
Start: 2024-10-30 | End: 2024-11-01

## 2024-10-30 RX ORDER — OXYCODONE HYDROCHLORIDE 30 MG/1
7.5 TABLET ORAL EVERY 4 HOURS
Refills: 0 | Status: DISCONTINUED | OUTPATIENT
Start: 2024-10-30 | End: 2024-11-01

## 2024-10-30 RX ORDER — ACETAMINOPHEN 500 MG
650 TABLET ORAL EVERY 6 HOURS
Refills: 0 | Status: DISCONTINUED | OUTPATIENT
Start: 2024-10-30 | End: 2024-11-01

## 2024-10-30 RX ORDER — OXYCODONE HYDROCHLORIDE 30 MG/1
5 TABLET ORAL EVERY 4 HOURS
Refills: 0 | Status: DISCONTINUED | OUTPATIENT
Start: 2024-10-30 | End: 2024-11-01

## 2024-10-30 RX ORDER — MUPIROCIN 20 MG/G
1 OINTMENT TOPICAL
Refills: 0 | Status: DISCONTINUED | OUTPATIENT
Start: 2024-10-30 | End: 2024-11-01

## 2024-10-30 RX ADMIN — Medication 650 MILLIGRAM(S): at 12:50

## 2024-10-30 RX ADMIN — VANCOMYCIN HYDROCHLORIDE 125 MILLIGRAM(S): KIT at 00:17

## 2024-10-30 RX ADMIN — Medication 3 MILLIGRAM(S): at 23:42

## 2024-10-30 RX ADMIN — VANCOMYCIN HYDROCHLORIDE 125 MILLIGRAM(S): KIT at 06:41

## 2024-10-30 RX ADMIN — VANCOMYCIN HYDROCHLORIDE 125 MILLIGRAM(S): KIT at 23:42

## 2024-10-30 RX ADMIN — CHLORHEXIDINE GLUCONATE 15 MILLILITER(S): 40 SOLUTION TOPICAL at 06:41

## 2024-10-30 RX ADMIN — GABAPENTIN 600 MILLIGRAM(S): 300 CAPSULE ORAL at 12:50

## 2024-10-30 RX ADMIN — Medication 650 MILLIGRAM(S): at 18:52

## 2024-10-30 RX ADMIN — Medication 1 TABLET(S): at 12:50

## 2024-10-30 RX ADMIN — Medication 40 MILLIGRAM(S): at 06:41

## 2024-10-30 RX ADMIN — Medication 650 MILLIGRAM(S): at 00:16

## 2024-10-30 RX ADMIN — VANCOMYCIN HYDROCHLORIDE 125 MILLIGRAM(S): KIT at 18:10

## 2024-10-30 RX ADMIN — Medication 650 MILLIGRAM(S): at 19:23

## 2024-10-30 RX ADMIN — Medication 650 MILLIGRAM(S): at 00:46

## 2024-10-30 RX ADMIN — VANCOMYCIN HYDROCHLORIDE 125 MILLIGRAM(S): KIT at 12:48

## 2024-10-30 RX ADMIN — MUPIROCIN 1 APPLICATION(S): 20 OINTMENT TOPICAL at 19:11

## 2024-10-30 RX ADMIN — CHLORHEXIDINE GLUCONATE 1 APPLICATION(S): 40 SOLUTION TOPICAL at 12:55

## 2024-10-30 RX ADMIN — Medication 3 MILLIGRAM(S): at 00:16

## 2024-10-30 RX ADMIN — FINASTERIDE 5 MILLIGRAM(S): 5 TABLET, FILM COATED ORAL at 12:50

## 2024-10-30 RX ADMIN — CHLORHEXIDINE GLUCONATE 15 MILLILITER(S): 40 SOLUTION TOPICAL at 18:11

## 2024-10-30 RX ADMIN — PANTOPRAZOLE SODIUM 40 MILLIGRAM(S): 40 TABLET, DELAYED RELEASE ORAL at 06:42

## 2024-10-30 RX ADMIN — Medication 650 MILLIGRAM(S): at 13:20

## 2024-10-30 NOTE — DISCHARGE NOTE PROVIDER - NSDCHOSPICE_GEN_A_CORE
Folder Color-  GREEN     Name of Form- [employer] Continuous Glucose Mon    Form to be filled out by-Doc of the Day on Dr Dany Arroyo behalf    Form to be Faxed to [phone]    Patient made aware of 10 business day policy 
Never smoker
No

## 2024-10-30 NOTE — DISCHARGE NOTE PROVIDER - NSDCFUADDAPPT_GEN_ALL_CORE_FT
ENT  - Please call [phone] for an appointment time ENT  - Please call [phone] for an appointment time    PCP  - Please follow up with PCP in 1 week for BP

## 2024-10-30 NOTE — CONSULT NOTE ADULT - PROVIDER SPECIALTY LIST ADULT
Patient ID: Lakisha Hampton is a 62 y.o. female.    Assessment/Plan:    Trigeminal neuralgia  Lakisha Hampton is a 62 year old female with a hx of trigeminal neuralgia well controlled on Gabapentin 100 mg bid, with an occasional 3rd dose when needed. She denies any adverse effects from gabapentin.     Plan:  - Continue current dosing of Gabapentin   - recent eGFR 99  - Follow up in 8 months; sooner if needed    Anxiety and depression  Reports Anxiety is under control and better with Lexapro 10 mg daily  Denies any adverse effects    Plan:  Continue current regimen     Visual disturbance  Visual disturbance described as squiggly lines and/or kaleidoscope vision occurring very briefly and intermittently since June 2023. Differentials include thyroid dysfunction as evidenced by recent TSH vs Ocular migraines vs related to known Sjogrens disease. This is occurring very rare, once since the last time she was seen and is very transient lasting <10 minutes with spontaneous resolution without treatment. MRI Brain and Orbit in the past were unremarkable ruling out any vascular pathology.     I encouraged her to follow up with her PCP regarding her thyroid function. She should continue with routine follow ups with Ophthalmology. She will continue to monitor and track occurrences, if they become frequent or duration becomes longer we can consider increasing gabapentin further for suspected optic migraines. Follow up in 8 months; sooner if needed.       Diagnoses and all orders for this visit:    Trigeminal neuralgia    Anxiety and depression    Visual disturbance         I have spent a total time of 30 minutes on 04/30/24 in caring for this patient including Diagnostic results, Prognosis, Risks and benefits of tx options, Importance of tx compliance, Risk factor reductions, Impressions, Documenting in the medical record, Reviewing / ordering tests, medicine, procedures  , and Obtaining or reviewing history  
.      Subjective:    HPI Lakisha Hampton is a 62 y.o. female who is known to the practice for chronic fibromyalgia, optic migraines and trigeminal neuralgia. At baseline she is maintained on Gabapentin 100 mg up to 3 times daily, average use is twice daily. She was last seen 10/24/23 and also follows with her Rhematologist Dr. South for fibromyalgia, Sjogren's disease and Lupus. She is treated with plaquenil and pilocarpine. She is also known to have open angle glaucoma and follows closely with ophthalmology. She is seen by [facility] for plantar fascitis and chronic shoulder pain. She is also maintained on Lexapro 10 mg for anxiety and depression.    At her last visit she reported kaleidoscope vision occurring very briefly and intermittently since June 2023, mri brain and orbit 11/11/23 was unremarkable as well as crp and sed rate. She also c/o lower extremity weakness, low back pain and occasional bowel incontinence for which she underwent STAT MRI lumbar spine imaging consistent with mild dextroscoliosis at L3-4 and a new small right paracentral protrusion at L4-5 abutting the traversing right s1 nerve root but with no significant central canal narrowing. She was advised to follow up with her Orthopedic team and GI for bowel incontinence.     She returns today in follow up and provides the below interval history:     Visual Disturbance  She reports she did not have any visual disturbances for a long time and then it returned last week. She states it was triggered by working outside in the sun. She states she developed both squiggly lines and kaleidoscope vision in both eyes (worse in the left>right). This lasted 4 minutes then spontaneously resolved. She states she has recently seen her Ophthalmologist (a few weeks ago) with reportedly normal exam. She denies any associated numbness, tingling, weakness, difficulty with speech, dizziness, or headache. She states her PCP is monitoring her TSH, 
Internal Medicine
but she is not currently being treated.      Trigeminal Neuralgia  She has continued on Gabapentin 100 mg bid   Occasionally she has right TMJ pain and locking which seems to flare up her trigeminal neuralgia.   She no longer has numbness in her face; only noticed when she is stressed.   She denies any sharp shooting pain, tingling, or pins needles sensations.  She fell and hit the right side of her face but had no difference in trigeminal neuralgia s/p fall.   She was evaluated by her PCP s/p fall.     Anxiety/Depression   Continues on Lexapro to 10 mg for anxiety; feels this is better   Her daughter delivered a healthy baby girl at 37 weeks gestation  She is enjoying spending time with her grandchildren      The following portions of the patient's history were reviewed and updated as appropriate: allergies, current medications, past family history, past medical history, past social history, past surgical history, and problem list.     Objective:    Blood pressure 111/69, pulse 77, temperature 97.6 °F (36.4 °C), temperature source Temporal, height 5' (1.524 m), weight 74.9 kg (165 lb 3.2 oz), SpO2 100%, not currently breastfeeding.    Neurological Exam    On neurological examination patient is alert, awake, oriented and in no distress. Speech is fluent without dysarthria or aphasia. She is able to rise easily without assistance from a seated position. Casual gait is normal including stance, stride, and arm swing.      ROS:    Review of Systems   Constitutional:  Negative for appetite change, fatigue and fever.   HENT: Negative.  Negative for hearing loss, tinnitus, trouble swallowing and voice change.    Eyes: Negative.  Negative for photophobia, pain and visual disturbance.   Respiratory: Negative.  Negative for shortness of breath.    Cardiovascular: Negative.  Negative for palpitations.   Gastrointestinal: Negative.  Negative for nausea and vomiting.   Endocrine: Negative.  Negative for cold intolerance. 
  Genitourinary: Negative.  Negative for dysuria, frequency and urgency.   Musculoskeletal:  Negative for back pain, gait problem, myalgias, neck pain and neck stiffness.   Skin: Negative.  Negative for rash.   Allergic/Immunologic: Negative.    Neurological: Negative.  Negative for dizziness, tremors, seizures, syncope, facial asymmetry, speech difficulty, weakness, light-headedness, numbness and headaches.        Fell one month ago   Hematological: Negative.  Does not bruise/bleed easily.   Psychiatric/Behavioral: Negative.  Negative for confusion, hallucinations and sleep disturbance.    All other systems reviewed and are negative.    Reviewed ROS as entered by medical assistant.                  
SICU

## 2024-10-30 NOTE — DISCHARGE NOTE PROVIDER - NSDCMRMEDTOKEN_GEN_ALL_CORE_FT
Caltrate  + vitamin D3: 1 tab orally once a day  Chondroitin-Glucosamine oral tablet: 1 tab(s) orally once a day (at bedtime)  9/30/2024  gabapentin 300 mg oral capsule: 1 cap(s) orally 3 times a day  L-Arginine 1000 mg oral tablet: 1 tab(s) orally once a day (at bedtime)  9/30/2024  Multiple Vitamins oral tablet: 1 tab(s) orally once a day  9/30/2024  oxyCODONE 5 mg oral tablet: 1 tab(s) orally every 6 hours MDD: 4  triamcinolone 0.1% topical cream: Apply topically to affected area as needed  Vitamin B-12 1000 mcg oral tablet: 1 tab(s) orally once a day   acetaminophen 160 mg/5 mL oral suspension: 20.31 milliliter(s) orally every 6 hours as needed for Temp greater or equal to 38C (100.4F), Mild Pain (1 - 3)  Caltrate  + vitamin D3: 1 tab orally once a day  chlorhexidine 0.12% mucous membrane liquid: 15 milliliter(s) mucous membrane 2 times a day  Chondroitin-Glucosamine oral tablet: 1 tab(s) orally once a day (at bedtime)  9/30/2024  gabapentin 300 mg oral capsule: 1 cap(s) orally 3 times a day  L-Arginine 1000 mg oral tablet: 1 tab(s) orally once a day (at bedtime)  9/30/2024  Multiple Vitamins oral tablet: 1 tab(s) orally once a day  9/30/2024  oxyCODONE 5 mg oral tablet: 1 tab(s) orally every 6 hours MDD: 4  pantoprazole 40 mg oral delayed release tablet: 1 tab(s) orally once a day (before a meal)  triamcinolone 0.1% topical cream: Apply topically to affected area as needed  vancomycin 125 mg oral capsule: 1 cap(s) orally every 6 hours  Vitamin B-12 1000 mcg oral tablet: 1 tab(s) orally once a day

## 2024-10-30 NOTE — DISCHARGE NOTE PROVIDER - CARE PROVIDER_API CALL
Cliff Perez  Head/Neck Surgery  [address] [place] [address]  Phone: [phone]  Fax: [phone]  Follow Up Time: 1 week

## 2024-10-30 NOTE — DISCHARGE NOTE PROVIDER - HOSPITAL COURSE
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    Positive for C.Diff while in SICU placed on precautions and PO vancomycin was started. 73 years old male with a PMHx of peripheral neuropathy, microdiscectomy, eczema and SCC of left gingivolabial sulcus s/p L SND 1-3, excision of gingival lesion and marginal mandibulectomy, L RFFF w/ STSG from L thigh on 10/24. Had ACACIA drains that were monitored for output to prevent seroma formation. ACACIA drains were removed. Required NG tube placement to allow wound healing. While in SICU, patient had a fever and increased WBC, was found to be positive for C.Diff while in SICU placed on precautions and PO vancomycin was started. Advanced to Bolus tube feeds. Patient initated on CLD on 10/28/2024 tolerating well, advanced to puree. PT and OT evaluated patient and cleared for discharge home without needs. Plastic surgery cleared for discharge. Patient was cleared for discharge home by Dr. Perez on 10/31/2024. All prescriptions were sent to a pharmacy that was agreed on with the patient.       73 years old male with a PMHx of peripheral neuropathy, microdiscectomy, eczema and SCC of left gingivolabial sulcus s/p L SND 1-3, excision of gingival lesion and marginal mandibulectomy, L RFFF w/ STSG from L thigh on 10/24. Had ACACIA drains that were monitored for output to prevent seroma formation. ACACIA drains were removed. Required NG tube placement to allow wound healing. While in SICU, patient had a fever and increased WBC, was found to be positive for C.Diff while in SICU placed on precautions and PO vancomycin was started. Advanced to Bolus tube feeds. Patient initated on CLD on 10/28/2024 tolerating well, advanced to puree. PT and OT evaluated patient and cleared for discharge home without needs. Plastic surgery cleared for discharge. Patient was cleared for discharge home by Dr. Perez on 11/1/2024. All prescriptions were sent to a pharmacy that was agreed on with the patient.

## 2024-10-30 NOTE — DISCHARGE NOTE PROVIDER - NSDCCPCAREPLAN_GEN_ALL_CORE_FT
PRINCIPAL DISCHARGE DIAGNOSIS  Diagnosis: Malignant neoplasm of gum, unspecified  Assessment and Plan of Treatment: Follow up outpatient

## 2024-10-30 NOTE — PROGRESS NOTE ADULT - SUBJECTIVE AND OBJECTIVE BOX
Plastic Surgery Progress Note (pg [facility]: [id number], [facility]: [phone])    SUBJECTIVE  The patient was seen and examined. No acute events overnight. Pain controlled, afebrile w/ stable vitals. C diff+    OBJECTIVE  ___________________________________________________  VITAL SIGNS / I&O's   Vital Signs Last 24 Hrs  T(C): 36.3 (30 Oct 2024 06:40), Max: 37.4 (29 Oct 2024 16:00)  T(F): 97.3 (30 Oct 2024 06:40), Max: 99.4 (29 Oct 2024 16:00)  HR: 77 (30 Oct 2024 06:40) (73 - 90)  BP: 136/71 (30 Oct 2024 06:40) (104/52 - 137/60)  BP(mean): 74 (29 Oct 2024 20:00) (66 - 76)  RR: 17 (30 Oct 2024 06:40) (15 - 23)  SpO2: 98% (30 Oct 2024 06:40) (95% - 100%)    Parameters below as of 30 Oct 2024 06:40  Patient On (Oxygen Delivery Method): room air          29 Oct 2024 07:01  -  30 Oct 2024 07:00  --------------------------------------------------------  IN:    Free Water: 125 mL    Jevity 1.5: 264 mL    Oral Fluid: 480 mL  Total IN: 869 mL    OUT:    Drain (mL): 20 mL    VAC (Vacuum Assisted Closure) System (mL): 0 mL    Voided (mL): 775 mL  Total OUT: 795 mL    Total NET: 74 mL        ___________________________________________________  PHYSICAL EXAM    -- CONSTITUTIONAL: NAD, lying in bed  -- NEURO: Awake, alert  -- HEENT: NC/AT, mucous membranes moist. NGT. Cook signal strong and triphasic. JPx1 SS. Intraoral flap with medial ecchymosis - in line with maxillary teeth. Likely post-operative vs 2/2 biting insensate flap. No concern for congestion.  -- NECK: Soft, no collections, stable swelling from prior. Submental ecchymosis. Neck incision c/d/i  -- EXTREMITIES: L arm wound vac in place intact to suction.  -- PSYCH: Affect normal, A&Ox3    ___________________________________________________  LABS                        9.7    16.57 )-----------( 166      ( 29 Oct 2024 01:00 )             30.1     29 Oct 2024 01:00    136    |  104    |  19     ----------------------------<  120    4.1     |  22     |  0.92     Ca    8.3        29 Oct 2024 01:00  Phos  3.0       29 Oct 2024 01:00  Mg     1.90      29 Oct 2024 01:00      PT/INR - ( 29 Oct 2024 01:00 )   PT: 11.4 sec;   INR: 0.96 ratio         PTT - ( 29 Oct 2024 01:00 )  PTT:27.7 sec  CAPILLARY BLOOD GLUCOSE            Urinalysis Basic - ( 29 Oct 2024 01:00 )    Color: Dark Yellow / Appearance: Clear / S.036 / pH: x  Gluc: 120 mg/dL / Ketone: Trace mg/dL  / Bili: Negative / Urobili: 0.2 mg/dL   Blood: x / Protein: Trace mg/dL / Nitrite: Negative   Leuk Esterase: Negative / RBC: x / WBC x   Sq Epi: x / Non Sq Epi: x / Bacteria: x      ___________________________________________________  MICRO  Recent Cultures:  Specimen Source: .Blood BLOOD, 10-29 @ 01:00; Results   No growth at 24 hours; Gram Stain: --; Organism: --  Specimen Source: .Blood BLOOD, 10-28 @ 21:50; Results   No growth at 24 hours; Gram Stain: --; Organism: --    ___________________________________________________  MEDICATIONS  (STANDING):  chlorhexidine 0.12% Liquid 15 milliLiter(s) Oral Mucosa two times a day  enoxaparin Injectable 40 milliGRAM(s) SubCutaneous every 24 hours  finasteride 5 milliGRAM(s) Oral daily  gabapentin 600 milliGRAM(s) Oral daily  labetalol 200 milliGRAM(s) Oral every 8 hours  melatonin 3 milliGRAM(s) Oral at bedtime  multivitamin 1 Tablet(s) Oral daily  pantoprazole    Tablet 40 milliGRAM(s) Oral before breakfast  vancomycin    Solution 125 milliGRAM(s) Oral every 6 hours    MEDICATIONS  (PRN):  acetaminophen   Oral Liquid .. 650 milliGRAM(s) Oral every 6 hours PRN Temp greater or equal to 38C (100.4F), Mild Pain (1 - 3)  oxyCODONE    Solution 7.5 milliGRAM(s) Oral every 4 hours PRN Severe Pain (7 - 10)  oxyCODONE    Solution 5 milliGRAM(s) Oral every 4 hours PRN Moderate Pain (4 - 6)

## 2024-10-30 NOTE — PROGRESS NOTE ADULT - SUBJECTIVE AND OBJECTIVE BOX
OTOLARYNGOLOGY (ENT) PROGRESS NOTE    PATIENT: AUSTYN LINTON  MRN: [id number]  : 48  [id number]  DATE OF SERVICE:  10-30-24  			        Subjective/ Interval:   Subjective/ Interval: Patient seen and examined at bedside in the SICU. Transferred from OR to SICU w/o complications. NGT confirmed in place on CXR.   10/25: Patient seen and examined at bedside in SICU. Required labetalol x10mg overnight and cleviprex drip to control BP  10/26: Patient seen and examined at bedside. AFVSS, given hydral and labetalol x1 at 7-8pm, placed back on cleviprex drip x5-10mins and then taken off for whole night, SBP started to increase to 150s ~5a given hydral x1, was having small volume voiding ~100cc bladder scan ~300, was OOBTC  10/27: Pt seen and examined at bedside. A-line discontinued, transitioned to q4h checks. Finasteride initiated for urinary retention. Has been off cleviprex gtt.   10/28: Patient seen and examined at bedside. AFVSS. PT evaluated, no needs. Listed for step down.  10/29: Patient seen and examined at bedside. Febrile to 103.2F  and cdiff positive. WBC 16.57  10/30: Patient seen and examined at bedside. No fevers overnight.         MEDICATIONS  (STANDING):  chlorhexidine 0.12% Liquid 15 milliLiter(s) Oral Mucosa two times a day  enoxaparin Injectable 40 milliGRAM(s) SubCutaneous every 24 hours  finasteride 5 milliGRAM(s) Oral daily  gabapentin 600 milliGRAM(s) Oral daily  labetalol 200 milliGRAM(s) Oral every 8 hours  melatonin 3 milliGRAM(s) Oral at bedtime  multivitamin 1 Tablet(s) Oral daily  pantoprazole    Tablet 40 milliGRAM(s) Oral before breakfast  vancomycin    Solution 125 milliGRAM(s) Oral every 6 hours    MEDICATIONS  (PRN):  acetaminophen   Oral Liquid .. 650 milliGRAM(s) Oral every 6 hours PRN Temp greater or equal to 38C (100.4F), Mild Pain (1 - 3)  oxyCODONE    Solution 7.5 milliGRAM(s) Oral every 4 hours PRN Severe Pain (7 - 10)  oxyCODONE    Solution 5 milliGRAM(s) Oral every 4 hours PRN Moderate Pain (4 - 6)          Vital Signs Last 24 Hrs  T(C): 36.4 (30 Oct 2024 02:00), Max: 37.4 (29 Oct 2024 16:00)  T(F): 97.6 (30 Oct 2024 02:00), Max: 99.4 (29 Oct 2024 16:00)  HR: 77 (30 Oct 2024 02:00) (72 - 90)  BP: 126/63 (30 Oct 2024 02:00) (102/52 - 137/60)  BP(mean): 74 (29 Oct 2024 20:00) (66 - 76)  RR: 18 (30 Oct 2024 02:00) (13 - 23)  SpO2: 96% (30 Oct 2024 02:00) (95% - 100%)    Parameters below as of 30 Oct 2024 02:00  Patient On (Oxygen Delivery Method): room air            PHYSICAL EXAM:   NAD, resting comfortably in bed   Neck flat and supple, no collection. Incision closed with staples c/d/i  ACACIA L neck w/ SS output  Cook doppler with strong arterial signal. flap with medial ecchymosis - in line with maxillary teeth. Likely post-operative vs 2/2 biting insensate flap. No concern for congestion. Incisions intact.  OC/OP: no erythema, bleeding, lacerations  Donor site covered w/ ACE bandage, wound vac in place      OTOLARYNGOLOGY (ENT) PROGRESS NOTE    PATIENT: AUSTYN LINTON  MRN: [id number]  : 48  [id number]  DATE OF SERVICE:  10-30-24  			        Subjective/ Interval:   Subjective/ Interval: Patient seen and examined at bedside in the SICU. Transferred from OR to SICU w/o complications. NGT confirmed in place on CXR.   10/25: Patient seen and examined at bedside in SICU. Required labetalol x10mg overnight and cleviprex drip to control BP  10/26: Patient seen and examined at bedside. AFVSS, given hydral and labetalol x1 at 7-8pm, placed back on cleviprex drip x5-10mins and then taken off for whole night, SBP started to increase to 150s ~5a given hydral x1, was having small volume voiding ~100cc bladder scan ~300, was OOBTC  10/27: Pt seen and examined at bedside. A-line discontinued, transitioned to q4h checks. Finasteride initiated for urinary retention. Has been off cleviprex gtt.   10/28: Patient seen and examined at bedside. AFVSS. PT evaluated, no needs. Listed for step down.  10/29: Patient seen and examined at bedside. Febrile to 103.2F  and cdiff positive. WBC 16.57  10/30: Patient seen and examined at bedside. No fevers overnight.  Tolerating FLD. NGT removed yesterday.        MEDICATIONS  (STANDING):  chlorhexidine 0.12% Liquid 15 milliLiter(s) Oral Mucosa two times a day  enoxaparin Injectable 40 milliGRAM(s) SubCutaneous every 24 hours  finasteride 5 milliGRAM(s) Oral daily  gabapentin 600 milliGRAM(s) Oral daily  labetalol 200 milliGRAM(s) Oral every 8 hours  melatonin 3 milliGRAM(s) Oral at bedtime  multivitamin 1 Tablet(s) Oral daily  pantoprazole    Tablet 40 milliGRAM(s) Oral before breakfast  vancomycin    Solution 125 milliGRAM(s) Oral every 6 hours    MEDICATIONS  (PRN):  acetaminophen   Oral Liquid .. 650 milliGRAM(s) Oral every 6 hours PRN Temp greater or equal to 38C (100.4F), Mild Pain (1 - 3)  oxyCODONE    Solution 7.5 milliGRAM(s) Oral every 4 hours PRN Severe Pain (7 - 10)  oxyCODONE    Solution 5 milliGRAM(s) Oral every 4 hours PRN Moderate Pain (4 - 6)          Vital Signs Last 24 Hrs  T(C): 36.4 (30 Oct 2024 02:00), Max: 37.4 (29 Oct 2024 16:00)  T(F): 97.6 (30 Oct 2024 02:00), Max: 99.4 (29 Oct 2024 16:00)  HR: 77 (30 Oct 2024 02:00) (72 - 90)  BP: 126/63 (30 Oct 2024 02:00) (102/52 - 137/60)  BP(mean): 74 (29 Oct 2024 20:00) (66 - 76)  RR: 18 (30 Oct 2024 02:00) (13 - 23)  SpO2: 96% (30 Oct 2024 02:00) (95% - 100%)    Parameters below as of 30 Oct 2024 02:00  Patient On (Oxygen Delivery Method): room air            PHYSICAL EXAM:   NAD, resting comfortably in bed   Neck flat and supple, no collection. Incision closed with staples c/d/i  ACACIA L neck w/ SS output  Cook doppler with strong arterial signal. flap with medial ecchymosis - in line with maxillary teeth. Likely post-operative vs 2/2 biting insensate flap. No concern for congestion. Incisions intact.  OC/OP: no erythema, bleeding, lacerations  Donor site covered w/ ACE bandage, wound vac in place

## 2024-10-30 NOTE — CONSULT NOTE ADULT - ASSESSMENT
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     Neuro  - n/v checks q1h  - cook doppler  - Tylenol, gabapentin, PRN oxy    Resp  - RA, NC PRN    CV  - SBP goal < 140 (cleviprex gtt)  - wean drip as tolerated     GI/Nutrition  - NPO/IVF  - KO tube in place    /Renal  - strict Is/Os  - lebron    Heme  - DVT PPX (Lovenox)    Endo  - monitor serum glucose    ID  - unasyn 24h post-op    Lines  - a line  - lebron  - Ko tube  - IV  - JPx1  - vacx1    Dispo  - SICU
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 s/p bx of left anterior mandibular gingival labial lesion on 9/6/2024 (+SCC), now s/p left neck dissections level 1-3 and excision of lesion of left gingovobuccal sulcus with sacrifice of mental nerve, marginal mandibulectomy with reconstruction using LFFF, course c.b w fever, now found c-diff positive    #SCC of left gingivolabial sulcus s/p L SND  -per primary team    #C-Diff colitis  -febrile, leukocytosis, diarrhea  -now defervesced but wbx rising - leukocytosis likely lagging behind clinical improvement as overall diarrhea has been improving  -bcx x 2 ngtd, cxr no consolidation, UA bland, no other obvious source of exam   -agree w po vanco as ordered - duration depends on clinical course - anticipate 2 week course for now - monitor stool counts   -cw contact isolation until diarrhea resolves   -monitor abd exam     #HTN  -controlled  -cw Labetalol   -monitor BP    #BPH  -cw home meds    # Anemia  -normocytic w hgb in 10 range - baseline hgb ~ 13-14  - no overt bleed  -likely post-op blood loss and myelosuppression  -trend cbc for now     #DVT ppx  -per primary team

## 2024-10-30 NOTE — PROGRESS NOTE ADULT - ASSESSMENT
AUSTYN LINTON is a  73yMale w/ SCC of left gingivolabial sulcus s/p L SND 1-3, excision of gingival lesion and marginal mandibulectomy, L RFFF w/ STSG from L thigh, now transitioned to q4h checks per ERAS protocol. Patient doing well.     PLAN:  - Pain meds (Standing tylenol 975mg q6 hrs or 650mg q6 if <60kg, gabapentin 600mg qd if age <70 and no diuretic rx, opioids as needed)  -Transition to FLD  -Continue vanc for cdiff.  - Flap checks  q1x24, q2x24, q4x72  - Baci 2x/d to neck incisions  - Lovenox   - c/w TFs  - s/p Unasyn x24h  - s/p Decadron 8mg x3 doses  - Peridex swish and spit BID  - pantoprazole 40 QD  - Bowel regimen (senna + miralax)  - Incentive spirometer  - f/u OT    Page ENT with any questions/concerns.  Peds Page #[phone]  Adult Page #[phone] AUSTYN LINTON is a  73yMale w/ SCC of left gingivolabial sulcus s/p L SND 1-3, excision of gingival lesion and marginal mandibulectomy, L RFFF w/ STSG from L thigh, now transitioned to q4h checks per ERAS protocol. Patient doing well.     PLAN:  - Pain meds (Standing tylenol 975mg q6 hrs or 650mg q6 if <60kg, gabapentin 600mg qd if age <70 and no diuretic rx, opioids as needed)  - Likely advance to [facility] today  - Continue vanc for cdiff.  - Flap checks  q1x24, q2x24, q4x72  - Baci 2x/d to neck incisions  - Lovenox   - c/w TFs  - Peridex swish and spit BID  - pantoprazole 40 QD  - Incentive spirometer  - f/u OT  - f/u fever work up    Page ENT with any questions/concerns.  Peds Page #[phone]  Adult Page #[phone]

## 2024-10-30 NOTE — CONSULT NOTE ADULT - SUBJECTIVE AND OBJECTIVE BOX
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      --------------------------------------------------------------------------------------  PAST MEDICAL HISTORY:   Lower back pain    Lumbar herniated disc    Contact dermatitis    Eczema    Malignant neoplasm of mouth, unspecified    H/O neuropathy    Unspecified lesions of oral mucosa    Primary cancer of gingival mucosa        PAST SURGICAL HISTORY:   S/P vasectomy    S/P arthroscopy of left knee    History of microdiscectomy    H/O excision of mass        FAMILY HISTORY:   Family history of Parkinson's disease (Father)    Family history of leukemia (Sibling)        HOME MEDICATIONS:    ALLERGIES:   latex (Rash)  No Known Drug Allergies      --------------------------------------------------------------------------------------    VITAL SIGNS:  ICU Vital Signs Last 24 Hrs  T(C): 36.6 (24 Oct 2024 06:08), Max: 36.6 (24 Oct 2024 06:08)  T(F): 97.9 (24 Oct 2024 06:08), Max: 97.9 (24 Oct 2024 06:08)  HR: 90 (24 Oct 2024 06:08) (90 - 90)  BP: 150/86 (24 Oct 2024 06:08) (150/86 - 150/86)  BP(mean): --  ABP: --  ABP(mean): --  RR: 14 (24 Oct 2024 06:08) (14 - 14)  SpO2: 96% (24 Oct 2024 06:08) (96% - 96%)        PHYSICAL EXAM:    General Appearance: no acute distress, NTND  HEENT: neck incisions c/d/i, cook doppler with good signal, JPx1 ss output   Chest: airway intact, non-labored breathing  CV: no JVD, palpable pulses b/l  Abdomen: soft, non-tender, non-distended, +BS   Extremities: WWP, L RFFF dressing c/d/i with vac to suction, L thigh STSG c/d/i             PATIENT CARE ACCESS DEVICES:  [ ] Peripheral IV  [ ] Central Venous Line	[ ] R	[ ] L	[ ] IJ	[ ] Fem	[ ] SC	Placed:   [ ] Arterial Line		[ ] R	[ ] L	[ ] Fem	[ ] Rad	[ ] Ax	Placed:   [ ] PICC:					[ ] Mediport  [ ] Urinary Catheter, Date Placed:   [x] Necessity of urinary, arterial, and venous catheters discussed      I/Os:  I&O's Summary      --------------------------------------------------------------------------------------  MEDICATIONS:   Neurologic Medications  acetaminophen   Oral Liquid .. 975 milliGRAM(s) Oral every 6 hours  gabapentin 600 milliGRAM(s) Oral daily  HYDROmorphone  Injectable 0.5 milliGRAM(s) IV Push once  ondansetron Injectable 4 milliGRAM(s) IV Push every 8 hours PRN Nausea and/or Vomiting  oxyCODONE    Solution 2.5 milliGRAM(s) Oral every 6 hours PRN Moderate Pain (4 - 6)  oxyCODONE    Solution 5 milliGRAM(s) Oral every 6 hours PRN Severe Pain (7 - 10)    Respiratory Medications    Cardiovascular Medications  clevidipine Infusion 2 mG/Hr IV Continuous <Continuous>    Gastrointestinal Medications  calcium gluconate IVPB 2 Gram(s) IV Intermittent once  pantoprazole  Injectable 40 milliGRAM(s) IV Push every 24 hours    Genitourinary Medications    Hematologic/Oncologic Medications  enoxaparin Injectable 40 milliGRAM(s) SubCutaneous every 24 hours    Antimicrobial/Immunologic Medications  ampicillin/sulbactam  IVPB        Endocrine/Metabolic Medications    Topical/Other Medications  chlorhexidine 0.12% Liquid 15 milliLiter(s) Oral Mucosa two times a day      --------------------------------------------------------------------------------------  LABS:       ABG - ( 24 Oct 2024 12:15 )  pH, Arterial: 7.49  pH, Blood: x     /  pCO2: 32    /  pO2: 157   / HCO3: 24    / Base Excess: 1.5   /  SaO2: 99.5            
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 wbc has been rising but he has defervesced. he was on empiric post-op abx w Unasyn which he now has completed. he denies cough, sputum, abd pain, dysuria.     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, HTN, BPH  Social: former smoker  FHx: no pertinent FHx    ROS:  all neg unless mentioned in hpi     MEDICATIONS  (STANDING):  chlorhexidine 0.12% Liquid 15 milliLiter(s) Oral Mucosa two times a day  enoxaparin Injectable 40 milliGRAM(s) SubCutaneous every 24 hours  finasteride 5 milliGRAM(s) Oral daily  gabapentin 600 milliGRAM(s) Oral daily  labetalol 200 milliGRAM(s) Oral every 8 hours  melatonin 3 milliGRAM(s) Oral at bedtime  multivitamin 1 Tablet(s) Oral daily  pantoprazole    Tablet 40 milliGRAM(s) Oral before breakfast  vancomycin    Solution 125 milliGRAM(s) Oral every 6 hours    MEDICATIONS  (PRN):  acetaminophen   Oral Liquid .. 650 milliGRAM(s) Oral every 6 hours PRN Temp greater or equal to 38C (100.4F), Mild Pain (1 - 3)  oxyCODONE    Solution 7.5 milliGRAM(s) Oral every 4 hours PRN Severe Pain (7 - 10)  oxyCODONE    Solution 5 milliGRAM(s) Oral every 4 hours PRN Moderate Pain (4 - 6)      T(C): 36.3 (10-30-24 @ 06:40)  HR: 77 (10-30-24 @ 06:40)  BP: 136/71 (10-30-24 @ 06:40)  RR: 17 (10-30-24 @ 06:40)  SpO2: 98% (10-30-24 @ 06:40)  CAPILLARY BLOOD GLUCOSE        I&O's Summary    29 Oct 2024 07:01  -  30 Oct 2024 07:00  --------------------------------------------------------  IN: 869 mL / OUT: 795 mL / NET: 74 mL    30 Oct 2024 07:01  -  30 Oct 2024 09:39  --------------------------------------------------------  IN: 500 mL / OUT: 250 mL / NET: 250 mL        PHYSICAL EXAM:  GENERAL: NAD  NECK: Supple, No JVD  CHEST/LUNG: Clear to auscultation bilaterally  HEART: Regular rate and rhythm; No murmurs, rubs, or gallops, No Edema  ABDOMEN: Soft, Nontender, Nondistended; Bowel sounds present  EXTREMITIES:  2+ Peripheral Pulses, No clubbing, cyanosis      LABS:                        10.3   18.49 )-----------( 204      ( 30 Oct 2024 06:40 )             32.4     10-30    140  |  106  |  13  ----------------------------<  101[H]  3.9   |  24  |  0.74    Ca    8.7      30 Oct 2024 06:40  Phos  3.0     10-30  Mg     2.30     10-30      PT/INR - ( 29 Oct 2024 01:00 )   PT: 11.4 sec;   INR: 0.96 ratio         PTT - ( 29 Oct 2024 01:00 )  PTT:27.7 sec      Urinalysis Basic - ( 30 Oct 2024 06:40 )    Color: x / Appearance: x / SG: x / pH: x  Gluc: 101 mg/dL / Ketone: x  / Bili: x / Urobili: x   Blood: x / Protein: x / Nitrite: x   Leuk Esterase: x / RBC: x / WBC x   Sq Epi: x / Non Sq Epi: x / Bacteria: x            RADIOLOGY & ADDITIONAL TESTS:    Imaging Personally Reviewed: CXR - LL atelectasis     Consultant(s) Notes Reviewed:      Care Discussed with Consultants/Other Providers: ENT PA - Tia

## 2024-10-30 NOTE — PROGRESS NOTE ADULT - ASSESSMENT
AUSTYN ROSSDONG is a 73yMale s/p left FOM resection for SCC of left gingivolabial sulcus with left radial forearm free flap and STSG from left thigh.    - Flap checks per ERAS protocol with Peela doppler  - Monitor ACACIA drain output  - Monitor wound vac output, will take down on POD7 10/31  - Skin graft donor site dressing down on POD7 10/31, reinforce PRN  - under ERAS protocol care  - Pain control  - Appreciate care per primary    Plastic Surgery   [facility]: [id number]  [facility]: [phone] No

## 2024-10-31 LAB
ANION GAP SERPL CALC-SCNC: 13 MMOL/L — SIGNIFICANT CHANGE UP (ref 7–14)
BUN SERPL-MCNC: 14 MG/DL — SIGNIFICANT CHANGE UP (ref 7–23)
CALCIUM SERPL-MCNC: 8.5 MG/DL — SIGNIFICANT CHANGE UP (ref 8.4–10.5)
CHLORIDE SERPL-SCNC: 104 MMOL/L — SIGNIFICANT CHANGE UP (ref 98–107)
CO2 SERPL-SCNC: 24 MMOL/L — SIGNIFICANT CHANGE UP (ref 22–31)
CREAT SERPL-MCNC: 0.67 MG/DL — SIGNIFICANT CHANGE UP (ref 0.5–1.3)
EGFR: 98 ML/MIN/1.73M2 — SIGNIFICANT CHANGE UP
GLUCOSE SERPL-MCNC: 109 MG/DL — HIGH (ref 70–99)
HCT VFR BLD CALC: 34.2 % — LOW (ref 39–50)
HGB BLD-MCNC: 10.9 G/DL — LOW (ref 13–17)
MAGNESIUM SERPL-MCNC: 2.2 MG/DL — SIGNIFICANT CHANGE UP (ref 1.6–2.6)
MCHC RBC-ENTMCNC: 30 PG — SIGNIFICANT CHANGE UP (ref 27–34)
MCHC RBC-ENTMCNC: 31.9 G/DL — LOW (ref 32–36)
MCV RBC AUTO: 94.2 FL — SIGNIFICANT CHANGE UP (ref 80–100)
NRBC # BLD: 0 /100 WBCS — SIGNIFICANT CHANGE UP (ref 0–0)
NRBC # FLD: 0 K/UL — SIGNIFICANT CHANGE UP (ref 0–0)
PHOSPHATE SERPL-MCNC: 2.7 MG/DL — SIGNIFICANT CHANGE UP (ref 2.5–4.5)
PLATELET # BLD AUTO: 242 K/UL — SIGNIFICANT CHANGE UP (ref 150–400)
POTASSIUM SERPL-MCNC: 3.6 MMOL/L — SIGNIFICANT CHANGE UP (ref 3.5–5.3)
POTASSIUM SERPL-SCNC: 3.6 MMOL/L — SIGNIFICANT CHANGE UP (ref 3.5–5.3)
RBC # BLD: 3.63 M/UL — LOW (ref 4.2–5.8)
RBC # FLD: 14.9 % — HIGH (ref 10.3–14.5)
SODIUM SERPL-SCNC: 141 MMOL/L — SIGNIFICANT CHANGE UP (ref 135–145)
WBC # BLD: 10.23 K/UL — SIGNIFICANT CHANGE UP (ref 3.8–10.5)
WBC # FLD AUTO: 10.23 K/UL — SIGNIFICANT CHANGE UP (ref 3.8–10.5)

## 2024-10-31 PROCEDURE — 99232 SBSQ HOSP IP/OBS MODERATE 35: CPT

## 2024-10-31 RX ORDER — ENOXAPARIN SODIUM 40MG/0.4ML
40 SYRINGE (ML) SUBCUTANEOUS EVERY 24 HOURS
Refills: 0 | Status: DISCONTINUED | OUTPATIENT
Start: 2024-11-01 | End: 2024-11-01

## 2024-10-31 RX ORDER — POTASSIUM PHOSPHATE 236; 224 MG/ML; MG/ML
15 INJECTION, SOLUTION INTRAVENOUS ONCE
Refills: 0 | Status: COMPLETED | OUTPATIENT
Start: 2024-10-31 | End: 2024-10-31

## 2024-10-31 RX ORDER — CHLORHEXIDINE GLUCONATE 40 MG/ML
15 SOLUTION TOPICAL
Refills: 0 | Status: DISCONTINUED | OUTPATIENT
Start: 2024-10-31 | End: 2024-11-01

## 2024-10-31 RX ADMIN — Medication 650 MILLIGRAM(S): at 23:15

## 2024-10-31 RX ADMIN — POTASSIUM PHOSPHATE 62.5 MILLIMOLE(S): 236; 224 INJECTION, SOLUTION INTRAVENOUS at 17:59

## 2024-10-31 RX ADMIN — CHLORHEXIDINE GLUCONATE 1 APPLICATION(S): 40 SOLUTION TOPICAL at 13:06

## 2024-10-31 RX ADMIN — VANCOMYCIN HYDROCHLORIDE 125 MILLIGRAM(S): KIT at 23:15

## 2024-10-31 RX ADMIN — VANCOMYCIN HYDROCHLORIDE 125 MILLIGRAM(S): KIT at 06:56

## 2024-10-31 RX ADMIN — VANCOMYCIN HYDROCHLORIDE 125 MILLIGRAM(S): KIT at 18:00

## 2024-10-31 RX ADMIN — Medication 650 MILLIGRAM(S): at 04:27

## 2024-10-31 RX ADMIN — CHLORHEXIDINE GLUCONATE 15 MILLILITER(S): 40 SOLUTION TOPICAL at 18:01

## 2024-10-31 RX ADMIN — CHLORHEXIDINE GLUCONATE 15 MILLILITER(S): 40 SOLUTION TOPICAL at 06:56

## 2024-10-31 RX ADMIN — Medication 40 MILLIGRAM(S): at 06:56

## 2024-10-31 RX ADMIN — GABAPENTIN 600 MILLIGRAM(S): 300 CAPSULE ORAL at 13:06

## 2024-10-31 RX ADMIN — VANCOMYCIN HYDROCHLORIDE 125 MILLIGRAM(S): KIT at 13:07

## 2024-10-31 RX ADMIN — Medication 650 MILLIGRAM(S): at 21:46

## 2024-10-31 RX ADMIN — FINASTERIDE 5 MILLIGRAM(S): 5 TABLET, FILM COATED ORAL at 13:06

## 2024-10-31 RX ADMIN — Medication 650 MILLIGRAM(S): at 13:50

## 2024-10-31 RX ADMIN — Medication 650 MILLIGRAM(S): at 03:57

## 2024-10-31 RX ADMIN — MUPIROCIN 1 APPLICATION(S): 20 OINTMENT TOPICAL at 18:02

## 2024-10-31 RX ADMIN — MUPIROCIN 1 APPLICATION(S): 20 OINTMENT TOPICAL at 06:57

## 2024-10-31 RX ADMIN — Medication 1 TABLET(S): at 13:06

## 2024-10-31 RX ADMIN — Medication 3 MILLIGRAM(S): at 21:45

## 2024-10-31 RX ADMIN — Medication 650 MILLIGRAM(S): at 13:10

## 2024-10-31 NOTE — PROGRESS NOTE ADULT - SUBJECTIVE AND OBJECTIVE BOX
OTOLARYNGOLOGY (ENT) PROGRESS NOTE    PATIENT: AUSTYN LINTON  MRN: [id number]  : 48  [id number]  DATE OF SERVICE:  10-31-24  			        Subjective/ Interval:   Subjective/ Interval: Patient seen and examined at bedside in the SICU. Transferred from OR to SICU w/o complications. NGT confirmed in place on CXR.   10/25: Patient seen and examined at bedside in SICU. Required labetalol x10mg overnight and cleviprex drip to control BP  10/26: Patient seen and examined at bedside. AFVSS, given hydral and labetalol x1 at 7-8pm, placed back on cleviprex drip x5-10mins and then taken off for whole night, SBP started to increase to 150s ~5a given hydral x1, was having small volume voiding ~100cc bladder scan ~300, was OOBTC  10/27: Pt seen and examined at bedside. A-line discontinued, transitioned to q4h checks. Finasteride initiated for urinary retention. Has been off cleviprex gtt.   10/28: Patient seen and examined at bedside. AFVSS. PT evaluated, no needs. Listed for step down.  10/29: Patient seen and examined at bedside. Febrile to 103.2F  and cdiff positive. WBC 16.57  10/30: Patient seen and examined at bedside. No fevers overnight.  Tolerating FLD. NGT removed yesterday.  10/31: Patient seen and examined at bedside. AFVSS. Advanced to puree diet. Remains on vancomycin for cdiff.         MEDICATIONS  (STANDING):  chlorhexidine 0.12% Liquid 15 milliLiter(s) Oral Mucosa two times a day  chlorhexidine 2% Cloths 1 Application(s) Topical daily  enoxaparin Injectable 40 milliGRAM(s) SubCutaneous every 24 hours  finasteride 5 milliGRAM(s) Oral daily  gabapentin 600 milliGRAM(s) Oral daily  labetalol 200 milliGRAM(s) Oral every 8 hours  melatonin 3 milliGRAM(s) Oral at bedtime  multivitamin 1 Tablet(s) Oral daily  mupirocin 2% Nasal 1 Application(s) Both Nostrils two times a day  pantoprazole    Tablet 40 milliGRAM(s) Oral before breakfast  vancomycin    Solution 125 milliGRAM(s) Oral every 6 hours    MEDICATIONS  (PRN):  acetaminophen   Oral Liquid .. 650 milliGRAM(s) Oral every 6 hours PRN Temp greater or equal to 38C (100.4F), Mild Pain (1 - 3)  oxyCODONE    Solution 7.5 milliGRAM(s) Oral every 4 hours PRN Severe Pain (7 - 10)  oxyCODONE    Solution 5 milliGRAM(s) Oral every 4 hours PRN Moderate Pain (4 - 6)                  PHYSICAL EXAM:   NAD, resting comfortably in bed   Neck flat and supple, no collection. Incision closed with staples c/d/i  Vital Signs Last 24 Hrs  T(C): 36.6 (31 Oct 2024 02:22), Max: 36.6 (31 Oct 2024 02:22)  T(F): 97.8 (31 Oct 2024 02:22), Max: 97.8 (31 Oct 2024 02:22)  HR: 75 (31 Oct 2024 02:22) (74 - 88)  BP: 129/65 (31 Oct 2024 02:22) (115/63 - 136/71)  BP(mean): --  RR: 18 (31 Oct 2024 02:22) (16 - 18)  SpO2: 97% (31 Oct 2024 02:22) (96% - 98%)    Parameters below as of 31 Oct 2024 02:22  Patient On (Oxygen Delivery Method): room air    ACACIA L neck w/ SS output  Cook doppler with strong arterial signal. flap with medial ecchymosis - in line with maxillary teeth. Likely post-operative vs 2/2 biting insensate flap. No concern for congestion. Incisions intact.  OC/OP: no erythema, bleeding, lacerations  Donor site covered w/ ACE bandage, wound vac in place      OTOLARYNGOLOGY (ENT) PROGRESS NOTE    PATIENT: AUSTYN LINTON  MRN: [id number]  : 48  [id number]  DATE OF SERVICE:  10-31-24  			        Subjective/ Interval:   Subjective/ Interval: Patient seen and examined at bedside in the SICU. Transferred from OR to SICU w/o complications. NGT confirmed in place on CXR.   10/25: Patient seen and examined at bedside in SICU. Required labetalol x10mg overnight and cleviprex drip to control BP  10/26: Patient seen and examined at bedside. AFVSS, given hydral and labetalol x1 at 7-8pm, placed back on cleviprex drip x5-10mins and then taken off for whole night, SBP started to increase to 150s ~5a given hydral x1, was having small volume voiding ~100cc bladder scan ~300, was OOBTC  10/27: Pt seen and examined at bedside. A-line discontinued, transitioned to q4h checks. Finasteride initiated for urinary retention. Has been off cleviprex gtt.   10/28: Patient seen and examined at bedside. AFVSS. PT evaluated, no needs. Listed for step down.  10/29: Patient seen and examined at bedside. Febrile to 103.2F  and cdiff positive. WBC 16.57  10/30: Patient seen and examined at bedside. No fevers overnight.  Tolerating FLD. NGT removed yesterday.  10/31: Patient seen and examined at bedside. AFVSS. Advanced to puree diet. Remains on vancomycin for cdiff. Diarrhea improving.        MEDICATIONS  (STANDING):  chlorhexidine 0.12% Liquid 15 milliLiter(s) Oral Mucosa two times a day  chlorhexidine 2% Cloths 1 Application(s) Topical daily  enoxaparin Injectable 40 milliGRAM(s) SubCutaneous every 24 hours  finasteride 5 milliGRAM(s) Oral daily  gabapentin 600 milliGRAM(s) Oral daily  labetalol 200 milliGRAM(s) Oral every 8 hours  melatonin 3 milliGRAM(s) Oral at bedtime  multivitamin 1 Tablet(s) Oral daily  mupirocin 2% Nasal 1 Application(s) Both Nostrils two times a day  pantoprazole    Tablet 40 milliGRAM(s) Oral before breakfast  vancomycin    Solution 125 milliGRAM(s) Oral every 6 hours    MEDICATIONS  (PRN):  acetaminophen   Oral Liquid .. 650 milliGRAM(s) Oral every 6 hours PRN Temp greater or equal to 38C (100.4F), Mild Pain (1 - 3)  oxyCODONE    Solution 7.5 milliGRAM(s) Oral every 4 hours PRN Severe Pain (7 - 10)  oxyCODONE    Solution 5 milliGRAM(s) Oral every 4 hours PRN Moderate Pain (4 - 6)                  PHYSICAL EXAM:   NAD, resting comfortably in bed   Neck flat and supple, no collection. Incision closed with staples c/d/i  Vital Signs Last 24 Hrs  T(C): 36.6 (31 Oct 2024 02:22), Max: 36.6 (31 Oct 2024 02:22)  T(F): 97.8 (31 Oct 2024 02:22), Max: 97.8 (31 Oct 2024 02:22)  HR: 75 (31 Oct 2024 02:22) (74 - 88)  BP: 129/65 (31 Oct 2024 02:22) (115/63 - 136/71)  BP(mean): --  RR: 18 (31 Oct 2024 02:22) (16 - 18)  SpO2: 97% (31 Oct 2024 02:22) (96% - 98%)    Parameters below as of 31 Oct 2024 02:22  Patient On (Oxygen Delivery Method): room air    ACACIA L neck w/ SS output  Cook doppler with strong arterial signal. flap with medial ecchymosis - in line with maxillary teeth. Likely post-operative vs 2/2 biting insensate flap. No concern for congestion. Incisions intact.  OC/OP: no erythema, bleeding, lacerations  Donor site covered w/ ACE bandage, wound vac in place

## 2024-10-31 NOTE — PROGRESS NOTE ADULT - ASSESSMENT
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 s/p bx of left anterior mandibular gingival labial lesion on 9/6/2024 (+SCC), now s/p left neck dissections level 1-3 and excision of lesion of left gingovobuccal sulcus with sacrifice of mental nerve, marginal mandibulectomy with reconstruction using LFFF, course c.b w fever, now found c-diff positive    #SCC of left gingivolabial sulcus s/p L SND  -per primary team    #C-Diff colitis  -febrile, leukocytosis, diarrhea  -now defervesced but wbx rising - leukocytosis likely lagging behind clinical improvement as overall diarrhea has been improving  -bcx x 2 ngtd, cxr no consolidation, UA bland, no other obvious source of exam   -agree w po vanco as ordered - clinically improving - anticipate 10-14 day course - monitor stool counts   -cw contact isolation until diarrhea resolves   -monitor abd exam     #HTN  -controlled  -cw Labetalol   -monitor BP    #BPH  -cw home meds    # Anemia  -normocytic w hgb in 10 range - baseline hgb ~ 13-14  - no overt bleed  -likely post-op blood loss and myelosuppression  -trend cbc for now     #DVT ppx  -per primary team

## 2024-10-31 NOTE — PROGRESS NOTE ADULT - ASSESSMENT
AUSTYN LINTON is a  73yMale w/ SCC of left gingivolabial sulcus s/p L SND 1-3, excision of gingival lesion and marginal mandibulectomy, L RFFF w/ STSG from L thigh, now transitioned to q4h checks per ERAS protocol. Patient doing well.     PLAN:  - Pain meds (Standing tylenol 975mg q6 hrs or 650mg q6 if <60kg, gabapentin 600mg qd if age <70 and no diuretic rx, opioids as needed)  - continue puree diet  - Continue vanc for cdiff.  - Flap checks  q1x24, q2x24, q4x72  - Baci 2x/d to neck incisions  - Lovenox   - c/w TFs  - Peridex swish and spit BID  - pantoprazole 40 QD  - Incentive spirometer  - f/u fever work up - BCx NGTD    Page ENT with any questions/concerns.  Peds Page #[id number]  Adult Page #[phone]

## 2024-10-31 NOTE — PROGRESS NOTE ADULT - SUBJECTIVE AND OBJECTIVE BOX
Patient is a 73y old  Male who presents with a chief complaint of MALIGNANT NEOPLASM OF GUM, UNSPECIFIED (30 Oct 2024 03:45)      SUBJECTIVE / OVERNIGHT EVENTS: resolving diarrhea, stool starting to form     ROS:  all neg unless mentioned in hpi     MEDICATIONS  (STANDING):  chlorhexidine 0.12% Liquid 15 milliLiter(s) Oral Mucosa two times a day  chlorhexidine 2% Cloths 1 Application(s) Topical daily  enoxaparin Injectable 40 milliGRAM(s) SubCutaneous every 24 hours  finasteride 5 milliGRAM(s) Oral daily  gabapentin 600 milliGRAM(s) Oral daily  labetalol 200 milliGRAM(s) Oral every 8 hours  melatonin 3 milliGRAM(s) Oral at bedtime  multivitamin 1 Tablet(s) Oral daily  mupirocin 2% Nasal 1 Application(s) Both Nostrils two times a day  pantoprazole    Tablet 40 milliGRAM(s) Oral before breakfast  vancomycin    Solution 125 milliGRAM(s) Oral every 6 hours    MEDICATIONS  (PRN):  acetaminophen   Oral Liquid .. 650 milliGRAM(s) Oral every 6 hours PRN Temp greater or equal to 38C (100.4F), Mild Pain (1 - 3)  oxyCODONE    Solution 7.5 milliGRAM(s) Oral every 4 hours PRN Severe Pain (7 - 10)  oxyCODONE    Solution 5 milliGRAM(s) Oral every 4 hours PRN Moderate Pain (4 - 6)      T(C): 36.3 (10-31-24 @ 06:55)  HR: 73 (10-31-24 @ 06:55)  BP: 137/71 (10-31-24 @ 06:55)  RR: 17 (10-31-24 @ 06:55)  SpO2: 98% (10-31-24 @ 06:55)  CAPILLARY BLOOD GLUCOSE        I&O's Summary    30 Oct 2024 07:01  -  31 Oct 2024 07:00  --------------------------------------------------------  IN: 1740 mL / OUT: 1061 mL / NET: 679 mL        PHYSICAL EXAM:  GENERAL: NAD  NECK: Supple, No JVD  CHEST/LUNG: Clear to auscultation bilaterally  HEART: Regular rate and rhythm; No murmurs, rubs, or gallops, No Edema  ABDOMEN: Soft, Nontender, Nondistended; Bowel sounds present  EXTREMITIES:  2+ Peripheral Pulses, No clubbing, cyanosis      LABS:                        10.3   18.49 )-----------( 204      ( 30 Oct 2024 06:40 )             32.4     10-30    140  |  106  |  13  ----------------------------<  101[H]  3.9   |  24  |  0.74    Ca    8.7      30 Oct 2024 06:40  Phos  3.0     10-30  Mg     2.30     10-30            Urinalysis Basic - ( 30 Oct 2024 06:40 )    Color: x / Appearance: x / SG: x / pH: x  Gluc: 101 mg/dL / Ketone: x  / Bili: x / Urobili: x   Blood: x / Protein: x / Nitrite: x   Leuk Esterase: x / RBC: x / WBC x   Sq Epi: x / Non Sq Epi: x / Bacteria: x            RADIOLOGY & ADDITIONAL TESTS:    Imaging Personally Reviewed:    Consultant(s) Notes Reviewed:      Care Discussed with Consultants/Other Providers:

## 2024-10-31 NOTE — PROGRESS NOTE ADULT - SUBJECTIVE AND OBJECTIVE BOX
Plastic Surgery Progress Note (pg [facility]: [id number], [facility]: [phone])    SUBJECTIVE  The patient was seen and examined. No acute events overnight. Pain controlled, afebrile w/ stable vitals. Cook wire cut, vac removed.    OBJECTIVE  ___________________________________________________  VITAL SIGNS / I&O's   Vital Signs Last 24 Hrs  T(C): 36.3 (31 Oct 2024 06:55), Max: 36.6 (31 Oct 2024 02:22)  T(F): 97.4 (31 Oct 2024 06:55), Max: 97.8 (31 Oct 2024 02:22)  HR: 73 (31 Oct 2024 06:55) (73 - 88)  BP: 137/71 (31 Oct 2024 06:55) (115/63 - 137/71)  BP(mean): --  RR: 17 (31 Oct 2024 06:55) (16 - 18)  SpO2: 98% (31 Oct 2024 06:55) (96% - 98%)    Parameters below as of 31 Oct 2024 06:55  Patient On (Oxygen Delivery Method): room air          30 Oct 2024 07:01  -  31 Oct 2024 07:00  --------------------------------------------------------  IN:    Oral Fluid: 1740 mL  Total IN: 1740 mL    OUT:    Drain (mL): 11 mL    VAC (Vacuum Assisted Closure) System (mL): 0 mL    Voided (mL): 1050 mL  Total OUT: 1061 mL    Total NET: 679 mL        ___________________________________________________  PHYSICAL EXAM    -- CONSTITUTIONAL: NAD, lying in bed  -- NEURO: Awake, alert  -- HEENT: NC/AT, mucous membranes moist. NGT. Cook wire cut. JPx1 SS. Intraoral flap with medial ecchymosis - in line with maxillary teeth. Likely post-operative vs 2/2 biting insensate flap. No concern for congestion.  -- NECK: Soft, no collections, stable swelling from prior. Submental ecchymosis. Neck incision c/d/i  -- EXTREMITIES: L arm wound vac removed, STSG with moderate take with separation at distal crease  -- PSYCH: Affect normal, A&Ox3      ___________________________________________________  LABS                        10.3   18.49 )-----------( 204      ( 30 Oct 2024 06:40 )             32.4     30 Oct 2024 06:40    140    |  106    |  13     ----------------------------<  101    3.9     |  24     |  0.74     Ca    8.7        30 Oct 2024 06:40  Phos  3.0       30 Oct 2024 06:40  Mg     2.30      30 Oct 2024 06:40        CAPILLARY BLOOD GLUCOSE            Urinalysis Basic - ( 30 Oct 2024 06:40 )    Color: x / Appearance: x / SG: x / pH: x  Gluc: 101 mg/dL / Ketone: x  / Bili: x / Urobili: x   Blood: x / Protein: x / Nitrite: x   Leuk Esterase: x / RBC: x / WBC x   Sq Epi: x / Non Sq Epi: x / Bacteria: x      ___________________________________________________  MICRO  Recent Cultures:  Specimen Source: .Blood BLOOD, 10-29 @ 01:00; Results   No growth at 48 Hours; Gram Stain: --; Organism: --  Specimen Source: .Blood BLOOD, 10-28 @ 21:50; Results   No growth at 48 Hours; Gram Stain: --; Organism: --    ___________________________________________________  MEDICATIONS  (STANDING):  chlorhexidine 0.12% Liquid 15 milliLiter(s) Oral Mucosa two times a day  chlorhexidine 2% Cloths 1 Application(s) Topical daily  enoxaparin Injectable 40 milliGRAM(s) SubCutaneous every 24 hours  finasteride 5 milliGRAM(s) Oral daily  gabapentin 600 milliGRAM(s) Oral daily  labetalol 200 milliGRAM(s) Oral every 8 hours  melatonin 3 milliGRAM(s) Oral at bedtime  multivitamin 1 Tablet(s) Oral daily  mupirocin 2% Nasal 1 Application(s) Both Nostrils two times a day  pantoprazole    Tablet 40 milliGRAM(s) Oral before breakfast  vancomycin    Solution 125 milliGRAM(s) Oral every 6 hours    MEDICATIONS  (PRN):  acetaminophen   Oral Liquid .. 650 milliGRAM(s) Oral every 6 hours PRN Temp greater or equal to 38C (100.4F), Mild Pain (1 - 3)  oxyCODONE    Solution 5 milliGRAM(s) Oral every 4 hours PRN Moderate Pain (4 - 6)  oxyCODONE    Solution 7.5 milliGRAM(s) Oral every 4 hours PRN Severe Pain (7 - 10)

## 2024-10-31 NOTE — PROGRESS NOTE ADULT - ASSESSMENT
AUSTYN LINTON is a 73yMale s/p left FOM resection for SCC of left gingivolabial sulcus with left radial forearm free flap and STSG from left thigh 10/24.    - Flap checks per ERAS protocol, Cook wire cut  - Monitor ACACIA drain output  - Wound vac taken down today POD7 10/31  - Skin graft donor site dressing taken down today POD7 10/31, leave open to air for 24h  - under ERAS protocol care  - Pain control  - Appreciate care per primary    Plastic Surgery   [facility]: [id number]  [facility]: [phone] EMR

## 2024-11-01 ENCOUNTER — TRANSCRIPTION ENCOUNTER (OUTPATIENT)
Age: 74
End: 2024-11-01

## 2024-11-01 VITALS
SYSTOLIC BLOOD PRESSURE: 122 MMHG | TEMPERATURE: 97 F | RESPIRATION RATE: 18 BRPM | HEART RATE: 70 BPM | DIASTOLIC BLOOD PRESSURE: 54 MMHG | OXYGEN SATURATION: 98 %

## 2024-11-01 PROCEDURE — 99232 SBSQ HOSP IP/OBS MODERATE 35: CPT

## 2024-11-01 RX ORDER — PANTOPRAZOLE SODIUM 40 MG/1
1 TABLET, DELAYED RELEASE ORAL
Qty: 14 | Refills: 0
Start: 2024-11-01 | End: 2024-11-14

## 2024-11-01 RX ORDER — LABETALOL HCL 200 MG
1 TABLET ORAL
Qty: 42 | Refills: 0
Start: 2024-11-01 | End: 2024-11-14

## 2024-11-01 RX ORDER — ACETAMINOPHEN 500 MG
20.31 TABLET ORAL
Qty: 0 | Refills: 0 | DISCHARGE
Start: 2024-11-01

## 2024-11-01 RX ORDER — VANCOMYCIN HYDROCHLORIDE 50 MG/ML
1 KIT ORAL
Qty: 28 | Refills: 0
Start: 2024-11-01 | End: 2024-11-07

## 2024-11-01 RX ORDER — CHLORHEXIDINE GLUCONATE 40 MG/ML
15 SOLUTION TOPICAL
Qty: 420 | Refills: 0
Start: 2024-11-01 | End: 2024-11-14

## 2024-11-01 RX ADMIN — Medication 40 MILLIGRAM(S): at 05:43

## 2024-11-01 RX ADMIN — MUPIROCIN 1 APPLICATION(S): 20 OINTMENT TOPICAL at 05:48

## 2024-11-01 RX ADMIN — Medication 200 MILLIGRAM(S): at 05:42

## 2024-11-01 RX ADMIN — VANCOMYCIN HYDROCHLORIDE 125 MILLIGRAM(S): KIT at 05:42

## 2024-11-01 RX ADMIN — CHLORHEXIDINE GLUCONATE 15 MILLILITER(S): 40 SOLUTION TOPICAL at 05:42

## 2024-11-01 RX ADMIN — PANTOPRAZOLE SODIUM 40 MILLIGRAM(S): 40 TABLET, DELAYED RELEASE ORAL at 05:41

## 2024-11-01 NOTE — PROGRESS NOTE ADULT - SUBJECTIVE AND OBJECTIVE BOX
Patient is a 73y old  Male who presents with a chief complaint of MALIGNANT NEOPLASM OF GUM, UNSPECIFIED (30 Oct 2024 03:45)      SUBJECTIVE / OVERNIGHT EVENTS: diarrhea resolved     ROS:  all neg unless mentioned in hpi     MEDICATIONS  (STANDING):  chlorhexidine 0.12% Liquid 15 milliLiter(s) Oral Mucosa two times a day  chlorhexidine 2% Cloths 1 Application(s) Topical daily  enoxaparin Injectable 40 milliGRAM(s) SubCutaneous every 24 hours  finasteride 5 milliGRAM(s) Oral daily  gabapentin 600 milliGRAM(s) Oral daily  labetalol 200 milliGRAM(s) Oral every 8 hours  melatonin 3 milliGRAM(s) Oral at bedtime  multivitamin 1 Tablet(s) Oral daily  mupirocin 2% Nasal 1 Application(s) Both Nostrils two times a day  pantoprazole    Tablet 40 milliGRAM(s) Oral before breakfast  vancomycin    Solution 125 milliGRAM(s) Oral every 6 hours    MEDICATIONS  (PRN):  acetaminophen   Oral Liquid .. 650 milliGRAM(s) Oral every 6 hours PRN Temp greater or equal to 38C (100.4F), Mild Pain (1 - 3)  oxyCODONE    Solution 7.5 milliGRAM(s) Oral every 4 hours PRN Severe Pain (7 - 10)  oxyCODONE    Solution 5 milliGRAM(s) Oral every 4 hours PRN Moderate Pain (4 - 6)      T(C): 36.4 (11-01-24 @ 05:30)  HR: 87 (11-01-24 @ 05:30)  BP: 154/74 (11-01-24 @ 05:30)  RR: 18 (11-01-24 @ 05:30)  SpO2: 97% (11-01-24 @ 05:30)  CAPILLARY BLOOD GLUCOSE        I&O's Summary    31 Oct 2024 07:01  -  01 Nov 2024 07:00  --------------------------------------------------------  IN: 1130 mL / OUT: 0 mL / NET: 1130 mL        PHYSICAL EXAM:  GENERAL: NAD  NECK: Supple, No JVD  CHEST/LUNG: Clear to auscultation bilaterally  HEART: Regular rate and rhythm; No murmurs, rubs, or gallops, No Edema  ABDOMEN: Soft, Nontender, Nondistended; Bowel sounds present  EXTREMITIES:  2+ Peripheral Pulses, No clubbing, cyanosis      LABS:                        10.9   10.23 )-----------( 242      ( 31 Oct 2024 12:46 )             34.2     10-31    141  |  104  |  14  ----------------------------<  109[H]  3.6   |  24  |  0.67    Ca    8.5      31 Oct 2024 12:46  Phos  2.7     10-31  Mg     2.20     10-31            Urinalysis Basic - ( 31 Oct 2024 12:46 )    Color: x / Appearance: x / SG: x / pH: x  Gluc: 109 mg/dL / Ketone: x  / Bili: x / Urobili: x   Blood: x / Protein: x / Nitrite: x   Leuk Esterase: x / RBC: x / WBC x   Sq Epi: x / Non Sq Epi: x / Bacteria: x            RADIOLOGY & ADDITIONAL TESTS:    Imaging Personally Reviewed:    Consultant(s) Notes Reviewed:      Care Discussed with Consultants/Other Providers:

## 2024-11-01 NOTE — PROGRESS NOTE ADULT - PROVIDER SPECIALTY LIST ADULT
Anesthesia
ENT
Plastic Surgery
SICU
SICU
ENT
Hospitalist
OMFS
Plastic Surgery
SICU
ENT
Hospitalist
Plastic Surgery

## 2024-11-01 NOTE — DISCHARGE NOTE NURSING/CASE MANAGEMENT/SOCIAL WORK - FINANCIAL ASSISTANCE
[facility] provides services at a reduced cost to those who are determined to be eligible through [facility]’s financial assistance program. Information regarding [facility]’s financial assistance program can be found by going to [email or web address] or by calling [phone].

## 2024-11-01 NOTE — DISCHARGE NOTE NURSING/CASE MANAGEMENT/SOCIAL WORK - PATIENT PORTAL LINK FT
You can access the FollowMyHealth Patient Portal offered by [facility] by registering at the following website: [email or web address]. By joining [facility]’s FollowMyHealth portal, you will also be able to view your health information using other applications (apps) compatible with our system.

## 2024-11-01 NOTE — PROGRESS NOTE ADULT - SUBJECTIVE AND OBJECTIVE BOX
Plastic Surgery Progress Note (pg [facility]: [id number], [facility]: [phone])    SUBJECTIVE  The patient was seen and examined. No acute events overnight. Pain controlled, afebrile w/ stable vitals.     OBJECTIVE  ___________________________________________________  VITAL SIGNS / I&O's   Vital Signs Last 24 Hrs  T(C): 36.4 (01 Nov 2024 05:30), Max: 36.4 (31 Oct 2024 10:00)  T(F): 97.6 (01 Nov 2024 05:30), Max: 97.6 (31 Oct 2024 10:00)  HR: 87 (01 Nov 2024 05:30) (73 - 87)  BP: 154/74 (01 Nov 2024 05:30) (126/67 - 154/74)  BP(mean): --  RR: 18 (01 Nov 2024 05:30) (18 - 19)  SpO2: 97% (01 Nov 2024 05:30) (96% - 100%)    Parameters below as of 01 Nov 2024 05:30  Patient On (Oxygen Delivery Method): room air          31 Oct 2024 07:01  -  01 Nov 2024 07:00  --------------------------------------------------------  IN:    IV PiggyBack: 250 mL    Oral Fluid: 880 mL  Total IN: 1130 mL    OUT:  Total OUT: 0 mL    Total NET: 1130 mL        ___________________________________________________  PHYSICAL EXAM    -- CONSTITUTIONAL: NAD, lying in bed  -- NEURO: Awake, alert  -- HEENT: NC/AT, mucous membranes moist. NGT. Cook wire secured with tegaderm. Intraoral flap with medial ecchymosis - in line with maxillary teeth. Likely post-operative vs 2/2 biting insensate flap. No concern for congestion.  -- NECK: Soft, no collections, stable swelling from prior. Submental ecchymosis improving. Neck incision c/d/i  -- EXTREMITIES: L arm STSG with moderate take with separation at distal crease  -- PSYCH: Affect normal, A&Ox3    ___________________________________________________  LABS                        10.9   10.23 )-----------( 242      ( 31 Oct 2024 12:46 )             34.2     31 Oct 2024 12:46    141    |  104    |  14     ----------------------------<  109    3.6     |  24     |  0.67     Ca    8.5        31 Oct 2024 12:46  Phos  2.7       31 Oct 2024 12:46  Mg     2.20      31 Oct 2024 12:46        CAPILLARY BLOOD GLUCOSE            Urinalysis Basic - ( 31 Oct 2024 12:46 )    Color: x / Appearance: x / SG: x / pH: x  Gluc: 109 mg/dL / Ketone: x  / Bili: x / Urobili: x   Blood: x / Protein: x / Nitrite: x   Leuk Esterase: x / RBC: x / WBC x   Sq Epi: x / Non Sq Epi: x / Bacteria: x      ___________________________________________________  MICRO  Recent Cultures:  Specimen Source: .Blood BLOOD, 10-29 @ 01:00; Results   No growth at 72 Hours; Gram Stain: --; Organism: --  Specimen Source: .Blood BLOOD, 10-28 @ 21:50; Results   No growth at 72 Hours; Gram Stain: --; Organism: --    ___________________________________________________  MEDICATIONS  (STANDING):  chlorhexidine 0.12% Liquid 15 milliLiter(s) Oral Mucosa two times a day  chlorhexidine 2% Cloths 1 Application(s) Topical daily  enoxaparin Injectable 40 milliGRAM(s) SubCutaneous every 24 hours  finasteride 5 milliGRAM(s) Oral daily  gabapentin 600 milliGRAM(s) Oral daily  labetalol 200 milliGRAM(s) Oral every 8 hours  melatonin 3 milliGRAM(s) Oral at bedtime  multivitamin 1 Tablet(s) Oral daily  mupirocin 2% Nasal 1 Application(s) Both Nostrils two times a day  pantoprazole    Tablet 40 milliGRAM(s) Oral before breakfast  vancomycin    Solution 125 milliGRAM(s) Oral every 6 hours    MEDICATIONS  (PRN):  acetaminophen   Oral Liquid .. 650 milliGRAM(s) Oral every 6 hours PRN Temp greater or equal to 38C (100.4F), Mild Pain (1 - 3)  oxyCODONE    Solution 7.5 milliGRAM(s) Oral every 4 hours PRN Severe Pain (7 - 10)  oxyCODONE    Solution 5 milliGRAM(s) Oral every 4 hours PRN Moderate Pain (4 - 6)

## 2024-11-01 NOTE — PROGRESS NOTE ADULT - ASSESSMENT
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

## 2024-11-01 NOTE — CHART NOTE - NSCHARTNOTEFT_GEN_A_CORE
NUTRITION FOLLOW UP NOTE    Pt seen for extended length of stay.     SOURCE: [X] Patient [X] Medical record [X] RN/PCA [X] Family/Caregiver at bedside  [X] Other: ENT PA during interdisciplinary rounds (IDR)    Medical Course: Per chart review, 73-year-old English speaking male admitted into [facility] from home due to Malignant neoplasm of gingiva. 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 Now s/p left neck dissections level 1-3 and excision of lesion of left gingovobuccal sulcus with sacrifice of mental nerve, marginal mandibulectomy with reconstruction.      Diet Prescription: Diet, Pureed (10-30-24 @ 08:27)      Food Allergy/Intolerance: [facility]    Nutrition Course: Patient on Pureed diet per ENT team, tolerated well. Observed about 75% completed tray at bedside. Family at bedside reports good tolerance to diet. Patient denies any nausea/vomiting/diarrhea/constipation or difficulty chewing and swallowing to current consistency. Last bowel movement on 10/30. Eager to go home today. Requested diet education on pureed diet. RD provided verbal and written instruction on Pureed diet. Importance of having well balanced meals, nutrient and protein dense foods. Strategies to increase kcal and protein intake discussed. Oral nutrition supplement options also provided. All nutrition related questions answered.     Pertinent Medications: MEDICATIONS  (STANDING):  chlorhexidine 0.12% Liquid 15 milliLiter(s) Oral Mucosa two times a day  chlorhexidine 2% Cloths 1 Application(s) Topical daily  enoxaparin Injectable 40 milliGRAM(s) SubCutaneous every 24 hours  finasteride 5 milliGRAM(s) Oral daily  gabapentin 600 milliGRAM(s) Oral daily  labetalol 200 milliGRAM(s) Oral every 8 hours  melatonin 3 milliGRAM(s) Oral at bedtime  multivitamin 1 Tablet(s) Oral daily  mupirocin 2% Nasal 1 Application(s) Both Nostrils two times a day  pantoprazole    Tablet 40 milliGRAM(s) Oral before breakfast  vancomycin    Solution 125 milliGRAM(s) Oral every 6 hours    MEDICATIONS  (PRN):  acetaminophen   Oral Liquid .. 650 milliGRAM(s) Oral every 6 hours PRN Temp greater or equal to 38C (100.4F), Mild Pain (1 - 3)  oxyCODONE    Solution 5 milliGRAM(s) Oral every 4 hours PRN Moderate Pain (4 - 6)  oxyCODONE    Solution 7.5 milliGRAM(s) Oral every 4 hours PRN Severe Pain (7 - 10)      Pertinent Labs: 10-31 Na141 mmol/L Glu 109 mg/dL[H] K+ 3.6 mmol/L Cr  0.67 mg/dL BUN 14 mg/dL 10-31 Phos 2.7 mg/dL     A1C with Estimated Average Glucose Result: 5.5 % (10-25-24 @ 00:20)      Weight: 10/29 -88.6kg   10/26-88.7kg  10/25-88.8kg  Weight Assessment: Relatively stable  Height: 172.7 cm  IBW:  154lbs / 70kg +10%=169lbs/77kg  BMI: 29.6kg/m^2    Physical Assessment, per nursing flowsheets:  Edema: no edema noted  Pressure Injury: No pressure ulcers/DTI noted in flowsheets.       Estimated Needs:   [X] No change since previous assessment, based on IBW- 77kg  4994-7886 kcal daily @ 25-30kcal/kg,  .9gm protein daily @ 1.2-1.5gm/kg         Previous Nutrition Diagnosis:   [ X]Need for alternate means of nutrition    Nutrition Diagnosis is [ ] ongoing  [ X] resolved [ ] not applicable   New Nutrition Diagnosis: [ X] not applicable     Education:  [X ] Provided pt and with verbal / written education on pureed diet as above      Interventions:   1) Continue current diet order, which remains appropriate at this time.       Monitor & Evaluate:  1) Monitor weights, labs, BM's, skin integrity, p.o. intake.   2) Please document % PO intake in nursing flowsheet.   3) Honor food and beverage preferences within diet restriction of patient in an effort to maximize level of nutrient intake.     Lewis Lui RD, CDN, MS pager [phone]  Also available on [employer] Teams

## 2024-11-01 NOTE — DISCHARGE NOTE NURSING/CASE MANAGEMENT/SOCIAL WORK - NSDCFUADDAPPT_GEN_ALL_CORE_FT
ENT  - Please call [phone] for an appointment time    PCP  - Please follow up with PCP in 1 week for BP

## 2024-11-01 NOTE — PROGRESS NOTE ADULT - ASSESSMENT
AUSTYN ROYER is a 73yMale s/p left FOM resection for SCC of left gingivolabial sulcus with left radial forearm free flap and STSG from left thigh 10/24.    - Flap checks per ERAS protocol, Cook wire cut  - Wound vac taken down POD7 10/31  - Skin graft donor site dressing taken down POD7 10/31, aquaphor to area as needed  - under ERAS protocol care  - Pain control  - Appreciate care per primary    Plastic Surgery   [facility]: [id number]  [facility]: [phone]

## 2024-11-01 NOTE — PROGRESS NOTE ADULT - ASSESSMENT
AUSTYN LINTON is a  73yMale w/ SCC of left gingivolabial sulcus s/p L SND 1-3, excision of gingival lesion and marginal mandibulectomy, L RFFF w/ STSG from L thigh, now transitioned to q4h checks per ERAS protocol. Patient doing well.     PLAN:  - Pain meds (Standing tylenol 975mg q6 hrs or 650mg q6 if <60kg, gabapentin 600mg qd if age <70 and no diuretic rx, opioids as needed)  - continue puree diet  - Continue vanc for cdiff.  - Baci 2x/d to neck incisions  - Lovenox   - Peridex swish and spit BID  - pantoprazole 40 QD  - Incentive spirometer  - discharge today      Page ENT with any questions/concerns.  Peds Page #[phone]  Adult Page #[phone]

## 2024-11-01 NOTE — PROGRESS NOTE ADULT - SUBJECTIVE AND OBJECTIVE BOX
OTOLARYNGOLOGY (ENT) PROGRESS NOTE    PATIENT: AUSTYN LINTON  MRN: [id number]  : [birth year]  [id number]  DATE OF SERVICE:  24  			        Subjective/ Interval:   Subjective/ Interval: Patient seen and examined at bedside in the SICU. Transferred from OR to SICU w/o complications. NGT confirmed in place on CXR.   10/25: Patient seen and examined at bedside in SICU. Required labetalol x10mg overnight and cleviprex drip to control BP  10/26: Patient seen and examined at bedside. AFVSS, given hydral and labetalol x1 at 7-8pm, placed back on cleviprex drip x5-10mins and then taken off for whole night, SBP started to increase to 150s ~5a given hydral x1, was having small volume voiding ~100cc bladder scan ~300, was OOBTC  10/27: Pt seen and examined at bedside. A-line discontinued, transitioned to q4h checks. Finasteride initiated for urinary retention. Has been off cleviprex gtt.   10/28: Patient seen and examined at bedside. AFVSS. PT evaluated, no needs. Listed for step down.  10/29: Patient seen and examined at bedside. Febrile to 103.2F  and cdiff positive. WBC 16.57  10/30: Patient seen and examined at bedside. No fevers overnight.  Tolerating FLD. NGT removed yesterday.  10/31: Patient seen and examined at bedside. AFVSS. Advanced to puree diet. Remains on vancomycin for cdiff. Diarrhea improving.  : Patient seen and examined at bedside. AFVSS. Tolerating puree diet. Diarrhea significantly improved.          Vital Signs Last 24 Hrs  T(C): 36.4 (2024 05:30), Max: 36.4 (31 Oct 2024 10:00)  T(F): 97.6 (2024 05:30), Max: 97.6 (31 Oct 2024 10:00)  HR: 87 (2024 05:30) (73 - 87)  BP: 154/74 (2024 05:30) (126/67 - 154/74)  BP(mean): --  RR: 18 (2024 05:30) (18 - 19)  SpO2: 97% (2024 05:30) (96% - 100%)    Parameters below as of 2024 05:30  Patient On (Oxygen Delivery Method): room air            NAD, resting comfortably in bed   Neck flat and supple, no collection. Incision closed with miya c/d/i  [employer] doppler with strong arterial signal. flap with medial ecchymosis - improved. Likely post-operative vs 2/2 biting insensate flap. No concern for congestion. Incisions intact.  OC/OP: no erythema, bleeding, lacerations  Donor site covered w/ ACE bandage

## 2024-11-01 NOTE — DISCHARGE NOTE NURSING/CASE MANAGEMENT/SOCIAL WORK - NSDCPEFALRISK_GEN_ALL_CORE
For information on Fall & Injury Prevention, visit: [email or web address] OR  [email or web address] OR  https://www.cdc.gov/steadi/patient.html

## 2024-11-03 LAB
CULTURE RESULTS: SIGNIFICANT CHANGE UP
CULTURE RESULTS: SIGNIFICANT CHANGE UP
SPECIMEN SOURCE: SIGNIFICANT CHANGE UP
SPECIMEN SOURCE: SIGNIFICANT CHANGE UP

## 2024-11-04 ENCOUNTER — INPATIENT (INPATIENT)
Facility: HOSPITAL | Age: 74
LOS: 6 days | Discharge: HOME | End: 2024-11-11
Attending: OTOLARYNGOLOGY | Admitting: OTOLARYNGOLOGY
Payer: MEDICARE

## 2024-11-04 VITALS
RESPIRATION RATE: 14 BRPM | HEART RATE: 77 BPM | OXYGEN SATURATION: 98 % | WEIGHT: 186.95 LBS | HEIGHT: 68 IN | DIASTOLIC BLOOD PRESSURE: 67 MMHG | TEMPERATURE: 97 F | SYSTOLIC BLOOD PRESSURE: 109 MMHG

## 2024-11-04 DIAGNOSIS — Z98.890 OTHER SPECIFIED POSTPROCEDURAL STATES: Chronic | ICD-10-CM

## 2024-11-04 DIAGNOSIS — Z98.89 OTHER SPECIFIED POSTPROCEDURAL STATES: Chronic | ICD-10-CM

## 2024-11-04 DIAGNOSIS — M27.2 INFLAMMATORY CONDITIONS OF JAWS: ICD-10-CM

## 2024-11-04 DIAGNOSIS — Z98.52 VASECTOMY STATUS: Chronic | ICD-10-CM

## 2024-11-04 DIAGNOSIS — T81.30XA DISRUPTION OF WOUND, UNSPECIFIED, INITIAL ENCOUNTER: ICD-10-CM

## 2024-11-04 PROBLEM — C06.9 MALIGNANT NEOPLASM OF MOUTH, UNSPECIFIED: Chronic | Status: INACTIVE | Noted: 2017-05-24 | Resolved: 2024-11-04

## 2024-11-04 PROBLEM — K13.70 UNSPECIFIED LESIONS OF ORAL MUCOSA: Chronic | Status: INACTIVE | Noted: 2024-08-29 | Resolved: 2024-11-04

## 2024-11-04 LAB
ALBUMIN SERPL ELPH-MCNC: 3.4 G/DL — SIGNIFICANT CHANGE UP (ref 3.3–5)
ALP SERPL-CCNC: 63 U/L — SIGNIFICANT CHANGE UP (ref 40–120)
ALT FLD-CCNC: 26 U/L — SIGNIFICANT CHANGE UP (ref 4–41)
ANION GAP SERPL CALC-SCNC: 10 MMOL/L — SIGNIFICANT CHANGE UP (ref 7–14)
APTT BLD: 28.4 SEC — SIGNIFICANT CHANGE UP (ref 24.5–35.6)
AST SERPL-CCNC: 13 U/L — SIGNIFICANT CHANGE UP (ref 4–40)
BASOPHILS # BLD AUTO: 0.06 K/UL — SIGNIFICANT CHANGE UP (ref 0–0.2)
BASOPHILS NFR BLD AUTO: 0.5 % — SIGNIFICANT CHANGE UP (ref 0–2)
BILIRUB SERPL-MCNC: 0.4 MG/DL — SIGNIFICANT CHANGE UP (ref 0.2–1.2)
BLD GP AB SCN SERPL QL: NEGATIVE — SIGNIFICANT CHANGE UP
BUN SERPL-MCNC: 7 MG/DL — SIGNIFICANT CHANGE UP (ref 7–23)
CALCIUM SERPL-MCNC: 8.6 MG/DL — SIGNIFICANT CHANGE UP (ref 8.4–10.5)
CHLORIDE SERPL-SCNC: 102 MMOL/L — SIGNIFICANT CHANGE UP (ref 98–107)
CO2 SERPL-SCNC: 29 MMOL/L — SIGNIFICANT CHANGE UP (ref 22–31)
CREAT SERPL-MCNC: 0.86 MG/DL — SIGNIFICANT CHANGE UP (ref 0.5–1.3)
EGFR: 91 ML/MIN/1.73M2 — SIGNIFICANT CHANGE UP
EOSINOPHIL # BLD AUTO: 0.03 K/UL — SIGNIFICANT CHANGE UP (ref 0–0.5)
EOSINOPHIL NFR BLD AUTO: 0.3 % — SIGNIFICANT CHANGE UP (ref 0–6)
GLUCOSE SERPL-MCNC: 113 MG/DL — HIGH (ref 70–99)
GRAM STN FLD: ABNORMAL
GRAM STN FLD: SIGNIFICANT CHANGE UP
HCT VFR BLD CALC: 31.4 % — LOW (ref 39–50)
HGB BLD-MCNC: 9.9 G/DL — LOW (ref 13–17)
IANC: 9.4 K/UL — HIGH (ref 1.8–7.4)
IMM GRANULOCYTES NFR BLD AUTO: 1.3 % — HIGH (ref 0–0.9)
INR BLD: 1 RATIO — SIGNIFICANT CHANGE UP (ref 0.85–1.16)
LYMPHOCYTES # BLD AUTO: 1.35 K/UL — SIGNIFICANT CHANGE UP (ref 1–3.3)
LYMPHOCYTES # BLD AUTO: 11.3 % — LOW (ref 13–44)
MAGNESIUM SERPL-MCNC: 2.3 MG/DL — SIGNIFICANT CHANGE UP (ref 1.6–2.6)
MCHC RBC-ENTMCNC: 29.3 PG — SIGNIFICANT CHANGE UP (ref 27–34)
MCHC RBC-ENTMCNC: 31.5 G/DL — LOW (ref 32–36)
MCV RBC AUTO: 92.9 FL — SIGNIFICANT CHANGE UP (ref 80–100)
MONOCYTES # BLD AUTO: 0.93 K/UL — HIGH (ref 0–0.9)
MONOCYTES NFR BLD AUTO: 7.8 % — SIGNIFICANT CHANGE UP (ref 2–14)
NEUTROPHILS # BLD AUTO: 9.4 K/UL — HIGH (ref 1.8–7.4)
NEUTROPHILS NFR BLD AUTO: 78.8 % — HIGH (ref 43–77)
NRBC # BLD: 0 /100 WBCS — SIGNIFICANT CHANGE UP (ref 0–0)
NRBC # FLD: 0 K/UL — SIGNIFICANT CHANGE UP (ref 0–0)
PHOSPHATE SERPL-MCNC: 3.1 MG/DL — SIGNIFICANT CHANGE UP (ref 2.5–4.5)
PLATELET # BLD AUTO: 288 K/UL — SIGNIFICANT CHANGE UP (ref 150–400)
POTASSIUM SERPL-MCNC: 3.8 MMOL/L — SIGNIFICANT CHANGE UP (ref 3.5–5.3)
POTASSIUM SERPL-SCNC: 3.8 MMOL/L — SIGNIFICANT CHANGE UP (ref 3.5–5.3)
PROT SERPL-MCNC: 6.5 G/DL — SIGNIFICANT CHANGE UP (ref 6–8.3)
PROTHROM AB SERPL-ACNC: 11.6 SEC — SIGNIFICANT CHANGE UP (ref 9.9–13.4)
RBC # BLD: 3.38 M/UL — LOW (ref 4.2–5.8)
RBC # FLD: 14.6 % — HIGH (ref 10.3–14.5)
RH IG SCN BLD-IMP: POSITIVE — SIGNIFICANT CHANGE UP
SODIUM SERPL-SCNC: 141 MMOL/L — SIGNIFICANT CHANGE UP (ref 135–145)
SPECIMEN SOURCE: SIGNIFICANT CHANGE UP
SPECIMEN SOURCE: SIGNIFICANT CHANGE UP
SURGICAL PATHOLOGY STUDY: SIGNIFICANT CHANGE UP
WBC # BLD: 11.92 K/UL — HIGH (ref 3.8–10.5)
WBC # FLD AUTO: 11.92 K/UL — HIGH (ref 3.8–10.5)

## 2024-11-04 PROCEDURE — 99285 EMERGENCY DEPT VISIT HI MDM: CPT | Mod: GC

## 2024-11-04 PROCEDURE — 70491 CT SOFT TISSUE NECK W/DYE: CPT | Mod: 26,MC

## 2024-11-04 RX ORDER — CHLORHEXIDINE GLUCONATE 40 MG/ML
15 SOLUTION TOPICAL
Refills: 0 | Status: DISCONTINUED | OUTPATIENT
Start: 2024-11-04 | End: 2024-11-11

## 2024-11-04 RX ORDER — LABETALOL HCL 200 MG
200 TABLET ORAL EVERY 8 HOURS
Refills: 0 | Status: DISCONTINUED | OUTPATIENT
Start: 2024-11-04 | End: 2024-11-04

## 2024-11-04 RX ORDER — AMPICILLIN AND SULBACTAM 2; 1 G/1; G/1
3 INJECTION, POWDER, FOR SOLUTION INTRAMUSCULAR; INTRAVENOUS EVERY 6 HOURS
Refills: 0 | Status: DISCONTINUED | OUTPATIENT
Start: 2024-11-04 | End: 2024-11-06

## 2024-11-04 RX ORDER — ACETAMINOPHEN 500 MG
650 TABLET ORAL EVERY 6 HOURS
Refills: 0 | Status: DISCONTINUED | OUTPATIENT
Start: 2024-11-04 | End: 2024-11-11

## 2024-11-04 RX ORDER — VANCOMYCIN HYDROCHLORIDE 50 MG/ML
125 KIT ORAL EVERY 6 HOURS
Refills: 0 | Status: COMPLETED | OUTPATIENT
Start: 2024-11-04 | End: 2024-11-07

## 2024-11-04 RX ORDER — GABAPENTIN 300 MG/1
300 CAPSULE ORAL THREE TIMES A DAY
Refills: 0 | Status: DISCONTINUED | OUTPATIENT
Start: 2024-11-04 | End: 2024-11-06

## 2024-11-04 RX ADMIN — VANCOMYCIN HYDROCHLORIDE 125 MILLIGRAM(S): KIT at 23:32

## 2024-11-04 RX ADMIN — Medication 650 MILLIGRAM(S): at 23:32

## 2024-11-04 RX ADMIN — GABAPENTIN 300 MILLIGRAM(S): 300 CAPSULE ORAL at 21:22

## 2024-11-04 RX ADMIN — AMPICILLIN AND SULBACTAM 200 GRAM(S): 2; 1 INJECTION, POWDER, FOR SOLUTION INTRAMUSCULAR; INTRAVENOUS at 23:33

## 2024-11-04 RX ADMIN — Medication 60 MILLILITER(S): at 19:25

## 2024-11-04 NOTE — ED PROVIDER NOTE - NS_BEDUNITTYPES_ED_ALL_ED
Prior Authorization needed    Ins. ONLY COVERS 1 TAB DAILY - Please call plan at [phone] to initiate prior authorization or call/fax pharmacy to change medication.  Patient ID# is [id number].    Current Outpatient Medications   Medication Sig Dispense Refill                     6    buPROPion  MG Oral Tablet 24 Hr Take 1 tablet (300 mg total) by mouth in the morning and 1 tablet (300 mg total) before bedtime. 90 tablet 1                     0      SURGERY

## 2024-11-04 NOTE — ED ADULT TRIAGE NOTE - CHIEF COMPLAINT QUOTE
pt ambulatory, presents s/p partial mandibulectomy with reconstruction using graft from L arm and L thigh on 10/24, discharged on 11/01, sent by MD for bloody discharge from mouth and "skin peeling" at site of L forearm graft. pt diagnosed with Cdiff 10/29, taking vanco as prescribed with improvement, endorses influent loose stools.   past medical history: malignant neoplasm of gum, neuropathy pt ambulatory, presents s/p partial mandibulectomy with reconstruction using graft from L arm and L thigh on 10/24, discharged on 11/01, sent by MD for bloody discharge from mouth and "skin peeling" at site of L forearm graft. pt diagnosed with Cdiff 10/29, taking vanco as prescribed with improvement, endorses infrequent loose stools.   past medical history: malignant neoplasm of gum, neuropathy

## 2024-11-04 NOTE — ED ADULT NURSE NOTE - NS ED NURSE RECORD ANOTHER VITAL SIGN
Pt's daughter called she said her moms bp is very high first time she checked it was 194/107  And second time was 224/117 and she is so shaky   I called Dr Court Pelayo and he suggested patient go to the ER, pt daughter Cristel is going to take her now Yes

## 2024-11-04 NOTE — ED PROVIDER NOTE - NSICDXPASTMEDICALHX_GEN_ALL_CORE_FT
PAST MEDICAL HISTORY:  Contact dermatitis     Eczema     H/O neuropathy left foot    Lumbar herniated disc     Primary cancer of gingival mucosa

## 2024-11-04 NOTE — ED PROVIDER NOTE - CLINICAL SUMMARY MEDICAL DECISION MAKING FREE TEXT BOX
72 y/o M with SCCA of left oral cavity s/p recent procedures discharged on 11/1 returning for bloody discharge from site of procedure. Patient seen by plastics and ENT in the ED.    - will obtain CBC, CMP, Mg, Phos, PT/PTT, wound culture  - pending final recommendations from plastics and ENT 74 y/o M with SCCA of left oral cavity s/p recent procedures discharged on 11/1 returning for bloody discharge from site of procedure. Patient seen by plastics and ENT in the ED.    - will obtain CBC, CMP, Mg, Phos, PT/PTT, wound culture  - CT Neck soft tissue w/ IV contrast  - pending final recommendations from plastics and ENT

## 2024-11-04 NOTE — H&P ADULT - NSICDXPASTSURGICALHX_GEN_ALL_CORE_FT
PAST SURGICAL HISTORY:  H/O excision of mass mouth 2017    History of mandibular surgery     History of microdiscectomy lumbar 7/7/2015    S/P arthroscopy of left knee 2013    S/P vasectomy 1992    
No complaints

## 2024-11-04 NOTE — ED PROVIDER NOTE - ATTENDING CONTRIBUTION TO CARE
72 y/o M w/ PMHx of peripheral neuropathy, microdiscectomy , SCCA in oral cavity s/p biopsy of L anterior mandibular gingival labial lesion on 9/6/24 (+SCC), s/p left neck dissections level 1-3 and excision of lesion of left gingivobuccal sulcus with sacrifice of mental nerve, marginal mandibulectomy with reconstruction using LFFF on 10/24/2024 with discharge 11/1 who presents with serosanguinous discharge x 1 day. Patient was to be seen in office today for evaluation of procedure and graft sites but was advised to present to the ED given his discharge. Denies any jaw pain, fever, chills, difficulty swallowing (currently on pureed diet) or changes to voice. He reports some numbness to the area since procedure in setting of mental nerve sacrifice. which is unchanged.    Denies any chest pain, shortness of breath, nausea, vomiting, diarrhea, constipation, abdominal pain.

## 2024-11-04 NOTE — ED PROVIDER NOTE - PROGRESS NOTE DETAILS
Donovan Hunter (EM/IM PGY-1). Vital signs stable. Patient comfortable, sitting upright in chair. Pending ENT recommendations, as per plastics nothing to do at this time. Donovan Hunter (EM/IM PGY-1). Vital signs stable. Patient comfortable still with no acute complaints. As per plastics recommend admitting to ENT. ENT to start empiric antibiotics and will admit patient under Dr. Perez. Discussed the plan with the patient and they are in agreement.

## 2024-11-04 NOTE — H&P ADULT - NSHPPHYSICALEXAM_GEN_ALL_CORE
Gen: NAD  Skin: No rashes, bruises, or lesions  Head: Normocephalic, Atraumatic  Face: no edema, erythema, or fluctuance. Parotid glands soft without mass  Eyes: no scleral injection  Ears: No evidence of any fluid drainage. No mastoid tenderness, erythema, or ear bulging  Nose: Nares bilaterally patent, no discharge  Mouth: + dehiscence on the left floor of mouth, + pus mixed with SS fluids noted when pressing the left neck via the dehiscence site. No stridor, no drooling, no trismus.  Mucosa moist, tongue/uvula midline, oropharynx clear  Neck: Flat, supple, aquacel packing in place in L lateral aspect of incision  Lymphatic: No lymphadenopathy

## 2024-11-04 NOTE — CONSULT NOTE ADULT - ASSESSMENT
74 y/o M w/ PMHx of peripheral neuropathy, microdiscectomy , SCCA in oral cavity s/p biopsy of L anterior mandibular gingival labial lesion on 9/6/24 (+SCC), s/p left neck dissections level 1-3 and excision of lesion of left gingivobuccal sulcus with sacrifice of mental nerve, marginal mandibulectomy with reconstruction using LFFF on 10/24/2024 with discharge 11/1 who presents with serosanguinous discharge from intraoral incision and LUE wound dehiscence.     Plan:  - local wound care for LUE: wet to dry dressing changes BID to distal dehiscence of STSG on wrist.   - LLE leave open to air versus cover with xeroform change daily  - no expressible infectious fluid at this time from intraoral incision  - no additional imaging necessary per PRS standpoint  - will consult ENT to evaluate patient as well  - follow up with Dr. 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     Plan:  - local wound care for LUE: wet to dry dressing changes BID to distal dehiscence of STSG on wrist.   - LLE leave open to air versus cover with xeroform change daily  - no expressible infectious fluid at this time from intraoral incision  - will consult ENT to evaluate patient as well; imaging per ENT      Jericho Lewis PGY4  Plastic Surgery  [id number]# [facility] pager  [phone] [facility] pager  [facility] Team [phone]  Available on Teams

## 2024-11-04 NOTE — ED PROVIDER NOTE - OBJECTIVE STATEMENT
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 Patient was to be seen in office today for evaluation of procedure and graft sites but was advised to present to the ED given his discharge. Denies any jaw pain, fever, chills, difficulty swallowing (currently on pureed diet) or changes to voice. He reports some numbness to the area since procedure in setting of mental nerve sacrifice. which is unchanged.    Denies any chest pain, shortness of breath, nausea, vomiting, diarrhea, constipation, abdominal pain.

## 2024-11-04 NOTE — ED ADULT NURSE NOTE - OBJECTIVE STATEMENT
pt A&Ox4 to ed c/o presents s/p partial mandibulectomy with reconstruction using graft from L arm and L thigh on 10/24, discharged on 11/01, sent by MD for bloody discharge from mouth and "skin peeling" at site of L forearm graft. pt diagnosed with Cdiff 10/29, taking vanco as prescribed with improvement, endorses infrequent loose stools. ambulatory. face asymmetrical due to surgery. Voice is clear. airway is patent. Drainage is serosanguinous with pressure to the left corner of his mouth. denies chest pain, sob, n/v, dizziness. 1.5/10 mouth pain/discomfort, MD aware. Hx of malignant neoplasm of gum, neuropathy. respirations even and unlabored. no acute distress noted at this time. plan of care continued. pt A&Ox4 to ed c/o presents s/p partial mandibulectomy with reconstruction using graft from L arm and L thigh on 10/24, discharged on 11/01, sent by MD for bloody discharge from mouth and "skin peeling" at site of L forearm graft. pt diagnosed with Cdiff 10/29, taking vanco as prescribed with improvement, endorses infrequent loose stools. ambulatory. face asymmetrical due to surgery. Voice is clear. airway is patent. Drainage is serosanguinous with pressure to the left corner of his mouth. denies chest pain, sob, n/v, dizziness. 1.5/10 mouth pain/discomfort, MD aware. Hx of malignant neoplasm of gum, neuropathy. respirations even and unlabored. 20g iv RAC. labs drawn and sent. no acute distress noted at this time. plan of care continued.

## 2024-11-04 NOTE — ED PROVIDER NOTE - PHYSICAL EXAMINATION
GENERAL: No acute distress, non toxic-appearing  HEAD: Atraumatic, normocephalic  EARS: Externally normal, atraumatic  EYES: No jaundice, not injected, no rupture, no foreign bodies  MOUTH: (+) swelling of the L oral cavity with serosanguinous drainage, uvula unable to be easily visualized.  NECK: (+) significant swelling over the L neck and mandible, not erythematous, warm or tender to palpation.   HEART: Regular rate and rhythm, normal S1/S2, no murmurs, no rubs, no gallops  LUNGS: Clear to auscultation bilaterally without rhonchi, rales, or wheezing  ABDOMEN: Soft, non-distended, and non tender in all 4 quadrants  EXTREMITIES: No gross deformities  VASCULAR: Pulses palpable in all extremities, no pitting edema, capillary refill <2 secs  SKIN: (+) dressing over left forearm graft area with some blood, not warm or erythematous, clean. (+) dressing over left thigh graft area, clean, dry and intact.  PSYCH: Alert and oriented x 3  NEURO: Strength 5/5 and sensation intact in all 4 extremities.

## 2024-11-04 NOTE — CONSULT NOTE ADULT - SUBJECTIVE AND OBJECTIVE BOX
Plastic Surgery Consult Note  ---------------------------------------    Chief Complaint:  Patient is a 73y old  Male who presents with a chief complaint of     HPI:  HPI:      PMH  Lower back pain    Lumbar herniated disc    Contact dermatitis    Eczema    Malignant neoplasm of mouth, unspecified    H/O neuropathy    Unspecified lesions of oral mucosa    Primary cancer of gingival mucosa        PSH  S/P vasectomy    S/P arthroscopy of left knee    History of microdiscectomy    H/O excision of mass    History of mandibular surgery        MEDS  Home Medications:  acetaminophen 160 mg/5 mL oral suspension: 20.31 milliliter(s) orally every 6 hours as needed for Temp greater or equal to 38C (100.4F), Mild Pain (1 - 3) (01 Nov 2024 09:00)  Caltrate  + vitamin D3: 1 tab orally once a day (24 Oct 2024 06:21)  Chondroitin-Glucosamine oral tablet: 1 tab(s) orally once a day (at bedtime)  9/30/2024 (24 Oct 2024 06:21)  gabapentin 300 mg oral capsule: 1 cap(s) orally 3 times a day (24 Oct 2024 06:21)  L-Arginine 1000 mg oral tablet: 1 tab(s) orally once a day (at bedtime)  9/30/2024 (24 Oct 2024 06:21)  Multiple Vitamins oral tablet: 1 tab(s) orally once a day  9/30/2024 (24 Oct 2024 06:21)  triamcinolone 0.1% topical cream: Apply topically to affected area as needed (24 Oct 2024 06:21)  Vitamin B-12 1000 mcg oral tablet: 1 tab(s) orally once a day (24 Oct 2024 06:21)    Allergies    latex (Rash)  No Known Drug Allergies    Intolerances        Social  Social History:        Physical Exam  Gen: NAD, comfortable  HEENT: Diffuse lower face swelling L>R with pitting edema and no erythema. Intraoral flap intact with healthy appearing color and mucosalization. Suture line intact, minimal sero/sang drainage.   Pulm: CTA B/L  Abd: Soft, ND, NTP, no rebound, no guarding, no palpable organomegaly/masses  Ext: LUE distal skin graft dehiscence with area of nonviable tissue. No signs of infection, no expressible fluid.   LLE: Skin graft donor site with some nonviable tissue in the wound base.     T(C): 36.2 (11-04-24 @ 09:24), Max: 36.2 (11-04-24 @ 09:24)  HR: 74 (11-04-24 @ 10:21) (74 - 77)  BP: 102/58 (11-04-24 @ 10:21) (102/58 - 109/67)  RR: 14 (11-04-24 @ 10:21) (14 - 14)  SpO2: 100% (11-04-24 @ 10:21) (98% - 100%)  Wt(kg): --  Tmax: T(C): , Max: 36.2 (11-04-24 @ 09:24)  Wt(kg): --      Labs:                    Imaging  CT max face with :   Plastic Surgery Consult Note  ---------------------------------------    Chief Complaint:  Patient is a 73y old  Male who presents with a chief complaint of     HPI:  HPI:      PMH  Lower back pain    Lumbar herniated disc    Contact dermatitis    Eczema    Malignant neoplasm of mouth, unspecified    H/O neuropathy    Unspecified lesions of oral mucosa    Primary cancer of gingival mucosa        PSH  S/P vasectomy    S/P arthroscopy of left knee    History of microdiscectomy    H/O excision of mass    History of mandibular surgery        MEDS  Home Medications:  acetaminophen 160 mg/5 mL oral suspension: 20.31 milliliter(s) orally every 6 hours as needed for Temp greater or equal to 38C (100.4F), Mild Pain (1 - 3) (01 Nov 2024 09:00)  Caltrate  + vitamin D3: 1 tab orally once a day (24 Oct 2024 06:21)  Chondroitin-Glucosamine oral tablet: 1 tab(s) orally once a day (at bedtime)  9/30/2024 (24 Oct 2024 06:21)  gabapentin 300 mg oral capsule: 1 cap(s) orally 3 times a day (24 Oct 2024 06:21)  L-Arginine 1000 mg oral tablet: 1 tab(s) orally once a day (at bedtime)  9/30/2024 (24 Oct 2024 06:21)  Multiple Vitamins oral tablet: 1 tab(s) orally once a day  9/30/2024 (24 Oct 2024 06:21)  triamcinolone 0.1% topical cream: Apply topically to affected area as needed (24 Oct 2024 06:21)  Vitamin B-12 1000 mcg oral tablet: 1 tab(s) orally once a day (24 Oct 2024 06:21)    Allergies    latex (Rash)  No Known Drug Allergies    Intolerances        Social  Social History:        Physical Exam  Gen: NAD, comfortable  HEENT: Diffuse lower face swelling L>R with pitting edema and no erythema. Intraoral flap intact with healthy appearing color and mucosalization. Suture line with small anterior dehiscence, minimal sero/sang drainage.   Pulm: CTA B/L  Abd: Soft, ND, NTP, no rebound, no guarding, no palpable organomegaly/masses  Ext: LUE distal skin graft dehiscence with area of nonviable tissue. No signs of infection, no expressible fluid.   LLE: Skin graft donor site with some nonviable tissue in the wound base.     T(C): 36.2 (11-04-24 @ 09:24), Max: 36.2 (11-04-24 @ 09:24)  HR: 74 (11-04-24 @ 10:21) (74 - 77)  BP: 102/58 (11-04-24 @ 10:21) (102/58 - 109/67)  RR: 14 (11-04-24 @ 10:21) (14 - 14)  SpO2: 100% (11-04-24 @ 10:21) (98% - 100%)  Wt(kg): --  Tmax: T(C): , Max: 36.2 (11-04-24 @ 09:24)  Wt(kg): --      Labs:                    Imaging  CT max face with :

## 2024-11-04 NOTE — CONSULT NOTE ADULT - ASSESSMENT
73 years old male with a PMHx of peripheral neuropathy, microdiscectomy, eczema and SCC of left gingivolabial sulcus s/p L SND 1-3, excision of gingival lesion and marginal mandibulectomy, L RFFF w/ STSG from L thigh on 10/24. Patient was discharged on 11/1/2024 and came back to ED this morning concerns for discharged from his mouth. Admit left neck is street than yesterday. PRS saw and noticed some dehiscence from the left arm flap, started wet to dry.  + dehiscence on the left floor of mouth, + pus mixed with SS fluids noted when pressing the left neck via the dehiscence site. Culture obtained at bedside. WBC 11.92.

## 2024-11-04 NOTE — PATIENT PROFILE ADULT - FALL HARM RISK - HARM RISK INTERVENTIONS

## 2024-11-04 NOTE — H&P ADULT - ASSESSMENT
73 years old male with a PMHx of peripheral neuropathy, microdiscectomy, eczema and SCC of left gingivolabial sulcus s/p L SND 1-3, excision of gingival lesion and marginal mandibulectomy, L RFFF w/ STSG from L thigh on 10/24. Patient was discharged on 11/1/2024, presented with dehiscence at left arm flap harvest site and with dehiscence in left floor of mouth w/ collection in L neck now s/p I&D w/ aquacel packing placed by PRS.       - f/u culture  - start unasyn  - continue vancomycin PO for c diff  - packing changes by PRS  - continue home medications  - NPO, will discuss placing NGT w/ Dr. Perez/PRS  - Admit to ENT under Dr. Perez

## 2024-11-04 NOTE — CONSULT NOTE ADULT - SUBJECTIVE AND OBJECTIVE BOX
CC: Drainage from the left floor of mouth    HPI: 73 years old male with a PMHx of peripheral neuropathy, microdiscectomy, eczema and SCC of left gingivolabial sulcus s/p L SND 1-3, excision of gingival lesion and marginal mandibulectomy, L RFFF w/ STSG from L thigh on 10/24.      PAST MEDICAL & SURGICAL HISTORY:  Lumbar herniated disc      Contact dermatitis      Eczema      H/O neuropathy  left foot      Primary cancer of gingival mucosa      S/P vasectomy  1992      S/P arthroscopy of left knee  2013      History of microdiscectomy  lumbar 7/7/2015      H/O excision of mass  mouth 2017      History of mandibular surgery        Allergies    latex (Rash)  No Known Drug Allergies    Intolerances      MEDICATIONS  (STANDING):    MEDICATIONS  (PRN):      Social History: **??**    Family history: No pertinent family history in first degree relatives    ROS:   ENT: all negative except as noted in HPI   CV: denies palpitations  Pulm: denies SOB, cough, hemoptysis  GI: denies change in appetite, indigestion, n/v  : denies pertinent urinary symptoms, urgency  Neuro: denies numbness/tingling, loss of sensation  Psych: denies anxiety  MS: denies muscle weakness, instability  Heme: denies easy bruising or bleeding  Endo: denies heat/cold intolerance, excessive sweating  Vascular: denies LE edema    Vital Signs Last 24 Hrs  T(C): 36.2 (04 Nov 2024 09:24), Max: 36.2 (04 Nov 2024 09:24)  T(F): 97.2 (04 Nov 2024 09:24), Max: 97.2 (04 Nov 2024 09:24)  HR: 74 (04 Nov 2024 10:21) (74 - 77)  BP: 102/58 (04 Nov 2024 10:21) (102/58 - 109/67)  BP(mean): --  RR: 14 (04 Nov 2024 10:21) (14 - 14)  SpO2: 100% (04 Nov 2024 10:21) (98% - 100%)    Parameters below as of 04 Nov 2024 10:21  Patient On (Oxygen Delivery Method): room air                              9.9    11.92 )-----------( 288      ( 04 Nov 2024 10:43 )             31.4    11-04    141  |  102  |  7   ----------------------------<  113[H]  3.8   |  29  |  0.86    Ca    8.6      04 Nov 2024 10:43  Phos  3.1     11-04  Mg     2.30     11-04    TPro  6.5  /  Alb  3.4  /  TBili  0.4  /  DBili  x   /  AST  13  /  ALT  26  /  AlkPhos  63  11-04   PT/INR - ( 04 Nov 2024 10:43 )   PT: 11.6 sec;   INR: 1.00 ratio         PTT - ( 04 Nov 2024 10:43 )  PTT:28.4 sec    PHYSICAL EXAM:  Gen: NAD  Skin: No rashes, bruises, or lesions  Head: Normocephalic, Atraumatic  Face: no edema, erythema, or fluctuance. Parotid glands soft without mass  Eyes: no scleral injection  Ears: Right - ear canal clear, TM intact without effusion or erythema. No evidence of any fluid drainage. No mastoid tenderness, erythema, or ear bulging            Left - ear canal clear, TM intact without effusion or erythema. No evidence of any fluid drainage. No mastoid tenderness, erythema, or ear bulging  Nose: Nares bilaterally patent, no discharge  Mouth: No stridor, no drooling, no trismus.  Mucosa moist, tongue/uvula midline, oropharynx clear  Neck: Flat, supple, no lymphadenopathy, trachea midline, no masses  Lymphatic: No lymphadenopathy  Resp: breathing easily, no stridor  CV: no peripheral edema/cyanosis  GI: nondistended   Peripheral vascular: no JVD or edema  Neuro: facial nerve intact, no facial droop      Diagnostic Nasal Endoscopy: (Scope #2 used)    Fiberoptic Indirect laryngoscopy:  (Scope #2 used)    IMAGING/ADDITIONAL STUDIES:  CC: Drainage from the left floor of mouth    HPI: 73 years old male with a PMHx of peripheral neuropathy, microdiscectomy, eczema and SCC of left gingivolabial sulcus s/p L SND 1-3, excision of gingival lesion and marginal mandibulectomy, L RFFF w/ STSG from L thigh on 10/24. Patient was discharged on 11/1/2024 and came back to ED this morning concerns for discharged from his mouth. Admit left neck is street than yesterday. PRS saw and noticed some dehiscence from the left arm flap, started wet to dry. Denies fever, chills, chest pain and abdominal pain.      PAST MEDICAL & SURGICAL HISTORY:  Lumbar herniated disc      Contact dermatitis      Eczema      H/O neuropathy  left foot      Primary cancer of gingival mucosa      S/P vasectomy  1992      S/P arthroscopy of left knee  2013      History of microdiscectomy  lumbar 7/7/2015      H/O excision of mass  mouth 2017      History of mandibular surgery        Allergies    latex (Rash)  No Known Drug Allergies    Intolerances      MEDICATIONS  (STANDING):    MEDICATIONS  (PRN):      Social History: never smoker, drink 3-4 cups of coffee per day    Family history:   Father  Still living? No  Family history of Parkinson's disease, Age at diagnosis: Age Unknown    Sibling  Still living? Yes, Estimated age: 51-60  Family history of leukemia, Age at diagnosis: Age Unknown.    ROS:   ENT: all negative except as noted in HPI   CV: denies palpitations  Pulm: denies SOB, cough, hemoptysis  GI: denies change in appetite, indigestion, n/v  : denies pertinent urinary symptoms, urgency  Neuro: denies numbness/tingling, loss of sensation  Psych: denies anxiety  MS: denies muscle weakness, instability  Heme: denies easy bruising or bleeding  Endo: denies heat/cold intolerance, excessive sweating  Vascular: denies LE edema    Vital Signs Last 24 Hrs  T(C): 36.2 (04 Nov 2024 09:24), Max: 36.2 (04 Nov 2024 09:24)  T(F): 97.2 (04 Nov 2024 09:24), Max: 97.2 (04 Nov 2024 09:24)  HR: 74 (04 Nov 2024 10:21) (74 - 77)  BP: 102/58 (04 Nov 2024 10:21) (102/58 - 109/67)  BP(mean): --  RR: 14 (04 Nov 2024 10:21) (14 - 14)  SpO2: 100% (04 Nov 2024 10:21) (98% - 100%)    Parameters below as of 04 Nov 2024 10:21  Patient On (Oxygen Delivery Method): room air                              9.9    11.92 )-----------( 288      ( 04 Nov 2024 10:43 )             31.4    11-04    141  |  102  |  7   ----------------------------<  113[H]  3.8   |  29  |  0.86    Ca    8.6      04 Nov 2024 10:43  Phos  3.1     11-04  Mg     2.30     11-04    TPro  6.5  /  Alb  3.4  /  TBili  0.4  /  DBili  x   /  AST  13  /  ALT  26  /  AlkPhos  63  11-04   PT/INR - ( 04 Nov 2024 10:43 )   PT: 11.6 sec;   INR: 1.00 ratio         PTT - ( 04 Nov 2024 10:43 )  PTT:28.4 sec    PHYSICAL EXAM:  Gen: NAD  Skin: No rashes, bruises, or lesions  Head: Normocephalic, Atraumatic  Face: no edema, erythema, or fluctuance. Parotid glands soft without mass  Eyes: no scleral injection  Ears: No evidence of any fluid drainage. No mastoid tenderness, erythema, or ear bulging  Nose: Nares bilaterally patent, no discharge  Mouth: + dehiscence on the left floor of mouth, + pus mixed with SS fluids noted when pressing the left neck via the dehiscence site. No stridor, no drooling, no trismus.  Mucosa moist, tongue/uvula midline, oropharynx clear  Neck: Flat, supple, no lymphadenopathy, trachea midline, no masses  Lymphatic: No lymphadenopathy  Resp: breathing easily, no stridor  CV: no peripheral edema/cyanosis  GI: nondistended   Peripheral vascular: no JVD or edema  Neuro: facial nerve intact, no facial droop

## 2024-11-04 NOTE — ED PROVIDER NOTE - NSICDXPASTSURGICALHX_GEN_ALL_CORE_FT
PAST SURGICAL HISTORY:  H/O excision of mass mouth 2017    History of mandibular surgery     History of microdiscectomy lumbar 7/7/2015    S/P arthroscopy of left knee 2013    S/P vasectomy 1992

## 2024-11-04 NOTE — ED ADULT NURSE NOTE - CHIEF COMPLAINT QUOTE
pt ambulatory, presents s/p partial mandibulectomy with reconstruction using graft from L arm and L thigh on 10/24, discharged on 11/01, sent by MD for bloody discharge from mouth and "skin peeling" at site of L forearm graft. pt diagnosed with Cdiff 10/29, taking vanco as prescribed with improvement, endorses infrequent loose stools.   past medical history: malignant neoplasm of gum, neuropathy

## 2024-11-05 LAB
ANION GAP SERPL CALC-SCNC: 13 MMOL/L — SIGNIFICANT CHANGE UP (ref 7–14)
BUN SERPL-MCNC: 9 MG/DL — SIGNIFICANT CHANGE UP (ref 7–23)
CALCIUM SERPL-MCNC: 8.4 MG/DL — SIGNIFICANT CHANGE UP (ref 8.4–10.5)
CHLORIDE SERPL-SCNC: 103 MMOL/L — SIGNIFICANT CHANGE UP (ref 98–107)
CO2 SERPL-SCNC: 26 MMOL/L — SIGNIFICANT CHANGE UP (ref 22–31)
CREAT SERPL-MCNC: 0.7 MG/DL — SIGNIFICANT CHANGE UP (ref 0.5–1.3)
EGFR: 97 ML/MIN/1.73M2 — SIGNIFICANT CHANGE UP
GLUCOSE SERPL-MCNC: 108 MG/DL — HIGH (ref 70–99)
GRAM STN FLD: ABNORMAL
HCT VFR BLD CALC: 31.5 % — LOW (ref 39–50)
HGB BLD-MCNC: 9.8 G/DL — LOW (ref 13–17)
MAGNESIUM SERPL-MCNC: 2.3 MG/DL — SIGNIFICANT CHANGE UP (ref 1.6–2.6)
MCHC RBC-ENTMCNC: 28.7 PG — SIGNIFICANT CHANGE UP (ref 27–34)
MCHC RBC-ENTMCNC: 31.1 G/DL — LOW (ref 32–36)
MCV RBC AUTO: 92.1 FL — SIGNIFICANT CHANGE UP (ref 80–100)
NRBC # BLD: 0 /100 WBCS — SIGNIFICANT CHANGE UP (ref 0–0)
NRBC # FLD: 0 K/UL — SIGNIFICANT CHANGE UP (ref 0–0)
PHOSPHATE SERPL-MCNC: 2.9 MG/DL — SIGNIFICANT CHANGE UP (ref 2.5–4.5)
PLATELET # BLD AUTO: 297 K/UL — SIGNIFICANT CHANGE UP (ref 150–400)
POTASSIUM SERPL-MCNC: 3.5 MMOL/L — SIGNIFICANT CHANGE UP (ref 3.5–5.3)
POTASSIUM SERPL-SCNC: 3.5 MMOL/L — SIGNIFICANT CHANGE UP (ref 3.5–5.3)
RBC # BLD: 3.42 M/UL — LOW (ref 4.2–5.8)
RBC # FLD: 14.7 % — HIGH (ref 10.3–14.5)
SODIUM SERPL-SCNC: 142 MMOL/L — SIGNIFICANT CHANGE UP (ref 135–145)
WBC # BLD: 8.65 K/UL — SIGNIFICANT CHANGE UP (ref 3.8–10.5)
WBC # FLD AUTO: 8.65 K/UL — SIGNIFICANT CHANGE UP (ref 3.8–10.5)

## 2024-11-05 RX ORDER — PETROLATUM 987.89 MG/G
1 OINTMENT TOPICAL
Refills: 0 | Status: DISCONTINUED | OUTPATIENT
Start: 2024-11-05 | End: 2024-11-11

## 2024-11-05 RX ADMIN — AMPICILLIN AND SULBACTAM 200 GRAM(S): 2; 1 INJECTION, POWDER, FOR SOLUTION INTRAMUSCULAR; INTRAVENOUS at 17:13

## 2024-11-05 RX ADMIN — GABAPENTIN 300 MILLIGRAM(S): 300 CAPSULE ORAL at 05:46

## 2024-11-05 RX ADMIN — Medication 650 MILLIGRAM(S): at 22:17

## 2024-11-05 RX ADMIN — PETROLATUM 1 APPLICATION(S): 987.89 OINTMENT TOPICAL at 17:13

## 2024-11-05 RX ADMIN — GABAPENTIN 300 MILLIGRAM(S): 300 CAPSULE ORAL at 13:05

## 2024-11-05 RX ADMIN — Medication 650 MILLIGRAM(S): at 23:00

## 2024-11-05 RX ADMIN — VANCOMYCIN HYDROCHLORIDE 125 MILLIGRAM(S): KIT at 17:13

## 2024-11-05 RX ADMIN — AMPICILLIN AND SULBACTAM 200 GRAM(S): 2; 1 INJECTION, POWDER, FOR SOLUTION INTRAMUSCULAR; INTRAVENOUS at 11:32

## 2024-11-05 RX ADMIN — AMPICILLIN AND SULBACTAM 200 GRAM(S): 2; 1 INJECTION, POWDER, FOR SOLUTION INTRAMUSCULAR; INTRAVENOUS at 05:46

## 2024-11-05 RX ADMIN — VANCOMYCIN HYDROCHLORIDE 125 MILLIGRAM(S): KIT at 05:46

## 2024-11-05 RX ADMIN — Medication 60 MILLILITER(S): at 17:14

## 2024-11-05 RX ADMIN — GABAPENTIN 300 MILLIGRAM(S): 300 CAPSULE ORAL at 21:43

## 2024-11-05 RX ADMIN — Medication 650 MILLIGRAM(S): at 00:02

## 2024-11-05 RX ADMIN — VANCOMYCIN HYDROCHLORIDE 125 MILLIGRAM(S): KIT at 12:02

## 2024-11-05 RX ADMIN — CHLORHEXIDINE GLUCONATE 15 MILLILITER(S): 40 SOLUTION TOPICAL at 05:46

## 2024-11-05 RX ADMIN — CHLORHEXIDINE GLUCONATE 15 MILLILITER(S): 40 SOLUTION TOPICAL at 17:13

## 2024-11-05 NOTE — PROVIDER CONTACT NOTE (CRITICAL VALUE NOTIFICATION) - SITUATION
abscess culture 11/4 gram stain moderate polymorphonuclear leukocytes and few gram positive cocci in pairs

## 2024-11-05 NOTE — PROGRESS NOTE ADULT - SUBJECTIVE AND OBJECTIVE BOX
OTOLARYNGOLOGY (ENT) PROGRESS NOTE    11/5: Patient admitted from emergency room last night. I&D performed by REBECA LOREDO. Tolerating puree diet.         Vital Signs Last 24 Hrs  T(C): 36.3 (05 Nov 2024 05:46), Max: 36.7 (04 Nov 2024 19:50)  T(F): 97.4 (05 Nov 2024 05:46), Max: 98 (04 Nov 2024 19:50)  HR: 70 (05 Nov 2024 05:46) (66 - 84)  BP: 140/70 (05 Nov 2024 05:46) (102/58 - 140/70)  BP(mean): --  RR: 17 (05 Nov 2024 05:46) (14 - 17)  SpO2: 97% (05 Nov 2024 05:46) (96% - 100%)    Parameters below as of 05 Nov 2024 05:46  Patient On (Oxygen Delivery Method): room air                NAD, resting comfortably in bed   Neck flat and supple, no collection. Left lateral incision with aquacel packing in place  Cook doppler with strong arterial signal. flap with medial ecchymosis, resolved. Likely post-operative vs 2/2 biting insensate flap. No concern for congestion. Incisions intact.  OC/OP: no erythema, bleeding, lacerations  Donor site covered w/ ACE bandage      OTOLARYNGOLOGY (ENT) PROGRESS NOTE    11/5: Patient admitted from emergency room last night. I&D performed by REBECA LOREDO. Tolerating puree diet.         Vital Signs Last 24 Hrs  T(C): 36.3 (05 Nov 2024 05:46), Max: 36.7 (04 Nov 2024 19:50)  T(F): 97.4 (05 Nov 2024 05:46), Max: 98 (04 Nov 2024 19:50)  HR: 70 (05 Nov 2024 05:46) (66 - 84)  BP: 140/70 (05 Nov 2024 05:46) (102/58 - 140/70)  BP(mean): --  RR: 17 (05 Nov 2024 05:46) (14 - 17)  SpO2: 97% (05 Nov 2024 05:46) (96% - 100%)    Parameters below as of 05 Nov 2024 05:46  Patient On (Oxygen Delivery Method): room air                NAD, resting comfortably in bed   Neck flat and supple, no collection. Left lateral incision with aquacel packing in place  flap with medial ecchymosis, resolved. Likely post-operative vs 2/2 biting insensate flap. No concern for congestion. Incisions intact.  OC/OP: no erythema, bleeding, lacerations  Donor site covered w/ ACE bandage

## 2024-11-05 NOTE — PROVIDER CONTACT NOTE (CRITICAL VALUE NOTIFICATION) - ASSESSMENT
verbal cues/1 person assist abscess culture 11/4 gram stain moderate polymorphonuclear leukocytes and few gram positive cocci in pairs

## 2024-11-05 NOTE — PROGRESS NOTE ADULT - SUBJECTIVE AND OBJECTIVE BOX
Plastic Surgery Progress Note (pg [facility]: [id number], [facility]: [phone])    SUBJECTIVE  The patient was seen and examined. No acute events overnight. Pain controlled, afebrile w/ stable vitals. SS fluid expressed from intraoral abscess on AM rounds.    OBJECTIVE  ___________________________________________________  VITAL SIGNS / I&O's   Vital Signs Last 24 Hrs  T(C): 36.3 (05 Nov 2024 05:46), Max: 36.7 (04 Nov 2024 19:50)  T(F): 97.4 (05 Nov 2024 05:46), Max: 98 (04 Nov 2024 19:50)  HR: 70 (05 Nov 2024 05:46) (66 - 84)  BP: 140/70 (05 Nov 2024 05:46) (102/58 - 140/70)  BP(mean): --  RR: 17 (05 Nov 2024 05:46) (14 - 17)  SpO2: 97% (05 Nov 2024 05:46) (96% - 100%)    Parameters below as of 05 Nov 2024 05:46  Patient On (Oxygen Delivery Method): room air          04 Nov 2024 07:01  -  05 Nov 2024 06:36  --------------------------------------------------------  IN:    Lactated Ringers: 720 mL    Oral Fluid: 290 mL  Total IN: 1010 mL    OUT:    Voided (mL): 0 mL  Total OUT: 0 mL    Total NET: 1010 mL        ___________________________________________________  PHYSICAL EXAM    Gen: NAD, comfortable  HEENT: Diffuse lower face swelling L>R with pitting edema and no erythema. Intraoral flap intact with healthy appearing color and mucosalization. Suture line with 2 pinpoint openings. Minimal SS drainage expressed on exam. Aquacel replaced.  Pulm: Breathing comfortably on RA  Ext: LUE distal skin graft dehiscence with area of nonviable tissue. No signs of infection, no expressible fluid. WTD dressing replaced  LLE: Skin graft donor site with some nonviable tissue in the wound base.     ___________________________________________________  LABS                        9.9    11.92 )-----------( 288      ( 04 Nov 2024 10:43 )             31.4     04 Nov 2024 10:43    141    |  102    |  7      ----------------------------<  113    3.8     |  29     |  0.86     Ca    8.6        04 Nov 2024 10:43  Phos  3.1       04 Nov 2024 10:43  Mg     2.30      04 Nov 2024 10:43    TPro  6.5    /  Alb  3.4    /  TBili  0.4    /  DBili  x      /  AST  13     /  ALT  26     /  AlkPhos  63     04 Nov 2024 10:43    PT/INR - ( 04 Nov 2024 10:43 )   PT: 11.6 sec;   INR: 1.00 ratio         PTT - ( 04 Nov 2024 10:43 )  PTT:28.4 sec  CAPILLARY BLOOD GLUCOSE            Urinalysis Basic - ( 04 Nov 2024 10:43 )    Color: x / Appearance: x / SG: x / pH: x  Gluc: 113 mg/dL / Ketone: x  / Bili: x / Urobili: x   Blood: x / Protein: x / Nitrite: x   Leuk Esterase: x / RBC: x / WBC x   Sq Epi: x / Non Sq Epi: x / Bacteria: x      ___________________________________________________  MICRO  Recent Cultures:  Specimen Source: .Abscess, 11-04 @ 16:24; Results --; Gram Stain:   Few polymorphonuclear leukocytes per low power field  No organisms seen per oil power field; Organism: --  Specimen Source: .Abscess, 11-04 @ 12:54; Results --; Gram Stain:   Moderate polymorphonuclear leukocytes per low power field  Few Gram positive cocci in pairs per oil power field[!]; Organism: --    ___________________________________________________  MEDICATIONS  (STANDING):  ampicillin/sulbactam  IVPB 3 Gram(s) IV Intermittent every 6 hours  chlorhexidine 0.12% Liquid 15 milliLiter(s) Oral Mucosa two times a day  gabapentin 300 milliGRAM(s) Oral three times a day  lactated ringers. 1000 milliLiter(s) (60 mL/Hr) IV Continuous <Continuous>  vancomycin    Solution 125 milliGRAM(s) Oral every 6 hours    MEDICATIONS  (PRN):  acetaminophen   Oral Liquid .. 650 milliGRAM(s) Oral every 6 hours PRN Mild Pain (1 - 3)

## 2024-11-06 ENCOUNTER — APPOINTMENT (OUTPATIENT)
Dept: OTOLARYNGOLOGY | Facility: CLINIC | Age: 74
End: 2024-11-06

## 2024-11-06 LAB
-  AZTREONAM: SIGNIFICANT CHANGE UP
-  AZTREONAM: SIGNIFICANT CHANGE UP
-  CEFEPIME: SIGNIFICANT CHANGE UP
-  CEFEPIME: SIGNIFICANT CHANGE UP
-  CEFTAZIDIME: SIGNIFICANT CHANGE UP
-  CEFTAZIDIME: SIGNIFICANT CHANGE UP
-  CIPROFLOXACIN: SIGNIFICANT CHANGE UP
-  CIPROFLOXACIN: SIGNIFICANT CHANGE UP
-  IMIPENEM: SIGNIFICANT CHANGE UP
-  IMIPENEM: SIGNIFICANT CHANGE UP
-  LEVOFLOXACIN: SIGNIFICANT CHANGE UP
-  LEVOFLOXACIN: SIGNIFICANT CHANGE UP
-  MEROPENEM: SIGNIFICANT CHANGE UP
-  MEROPENEM: SIGNIFICANT CHANGE UP
-  PIPERACILLIN/TAZOBACTAM: SIGNIFICANT CHANGE UP
-  PIPERACILLIN/TAZOBACTAM: SIGNIFICANT CHANGE UP
ANION GAP SERPL CALC-SCNC: 12 MMOL/L — SIGNIFICANT CHANGE UP (ref 7–14)
BUN SERPL-MCNC: 6 MG/DL — LOW (ref 7–23)
CALCIUM SERPL-MCNC: 8.2 MG/DL — LOW (ref 8.4–10.5)
CHLORIDE SERPL-SCNC: 103 MMOL/L — SIGNIFICANT CHANGE UP (ref 98–107)
CO2 SERPL-SCNC: 26 MMOL/L — SIGNIFICANT CHANGE UP (ref 22–31)
CREAT SERPL-MCNC: 0.7 MG/DL — SIGNIFICANT CHANGE UP (ref 0.5–1.3)
CULTURE RESULTS: ABNORMAL
CULTURE RESULTS: ABNORMAL
EGFR: 97 ML/MIN/1.73M2 — SIGNIFICANT CHANGE UP
GLUCOSE SERPL-MCNC: 98 MG/DL — SIGNIFICANT CHANGE UP (ref 70–99)
HCT VFR BLD CALC: 28.8 % — LOW (ref 39–50)
HGB BLD-MCNC: 9.4 G/DL — LOW (ref 13–17)
MAGNESIUM SERPL-MCNC: 2 MG/DL — SIGNIFICANT CHANGE UP (ref 1.6–2.6)
MCHC RBC-ENTMCNC: 29.6 PG — SIGNIFICANT CHANGE UP (ref 27–34)
MCHC RBC-ENTMCNC: 32.6 G/DL — SIGNIFICANT CHANGE UP (ref 32–36)
MCV RBC AUTO: 90.6 FL — SIGNIFICANT CHANGE UP (ref 80–100)
METHOD TYPE: SIGNIFICANT CHANGE UP
METHOD TYPE: SIGNIFICANT CHANGE UP
NRBC # BLD: 0 /100 WBCS — SIGNIFICANT CHANGE UP (ref 0–0)
NRBC # FLD: 0 K/UL — SIGNIFICANT CHANGE UP (ref 0–0)
ORGANISM # SPEC MICROSCOPIC CNT: ABNORMAL
PHOSPHATE SERPL-MCNC: 2.9 MG/DL — SIGNIFICANT CHANGE UP (ref 2.5–4.5)
PLATELET # BLD AUTO: 279 K/UL — SIGNIFICANT CHANGE UP (ref 150–400)
POTASSIUM SERPL-MCNC: 3.4 MMOL/L — LOW (ref 3.5–5.3)
POTASSIUM SERPL-SCNC: 3.4 MMOL/L — LOW (ref 3.5–5.3)
RBC # BLD: 3.18 M/UL — LOW (ref 4.2–5.8)
RBC # FLD: 14.2 % — SIGNIFICANT CHANGE UP (ref 10.3–14.5)
SODIUM SERPL-SCNC: 141 MMOL/L — SIGNIFICANT CHANGE UP (ref 135–145)
SPECIMEN SOURCE: SIGNIFICANT CHANGE UP
SPECIMEN SOURCE: SIGNIFICANT CHANGE UP
WBC # BLD: 8.39 K/UL — SIGNIFICANT CHANGE UP (ref 3.8–10.5)
WBC # FLD AUTO: 8.39 K/UL — SIGNIFICANT CHANGE UP (ref 3.8–10.5)

## 2024-11-06 RX ORDER — PIPERACILLIN AND TAZOBACTAM .5; 4 G/20ML; G/20ML
3.38 INJECTION, POWDER, LYOPHILIZED, FOR SOLUTION INTRAVENOUS EVERY 8 HOURS
Refills: 0 | Status: DISCONTINUED | OUTPATIENT
Start: 2024-11-06 | End: 2024-11-11

## 2024-11-06 RX ORDER — GABAPENTIN 300 MG/1
300 CAPSULE ORAL THREE TIMES A DAY
Refills: 0 | Status: DISCONTINUED | OUTPATIENT
Start: 2024-11-06 | End: 2024-11-11

## 2024-11-06 RX ORDER — PIPERACILLIN AND TAZOBACTAM .5; 4 G/20ML; G/20ML
3.38 INJECTION, POWDER, LYOPHILIZED, FOR SOLUTION INTRAVENOUS ONCE
Refills: 0 | Status: COMPLETED | OUTPATIENT
Start: 2024-11-06 | End: 2024-11-06

## 2024-11-06 RX ORDER — POTASSIUM CHLORIDE 10 MEQ
10 TABLET, EXTENDED RELEASE ORAL
Refills: 0 | Status: COMPLETED | OUTPATIENT
Start: 2024-11-06 | End: 2024-11-06

## 2024-11-06 RX ADMIN — Medication 650 MILLIGRAM(S): at 23:56

## 2024-11-06 RX ADMIN — VANCOMYCIN HYDROCHLORIDE 125 MILLIGRAM(S): KIT at 05:17

## 2024-11-06 RX ADMIN — CHLORHEXIDINE GLUCONATE 15 MILLILITER(S): 40 SOLUTION TOPICAL at 17:35

## 2024-11-06 RX ADMIN — AMPICILLIN AND SULBACTAM 200 GRAM(S): 2; 1 INJECTION, POWDER, FOR SOLUTION INTRAMUSCULAR; INTRAVENOUS at 05:16

## 2024-11-06 RX ADMIN — Medication 650 MILLIGRAM(S): at 23:26

## 2024-11-06 RX ADMIN — Medication 75 MILLILITER(S): at 11:22

## 2024-11-06 RX ADMIN — PETROLATUM 1 APPLICATION(S): 987.89 OINTMENT TOPICAL at 17:36

## 2024-11-06 RX ADMIN — AMPICILLIN AND SULBACTAM 200 GRAM(S): 2; 1 INJECTION, POWDER, FOR SOLUTION INTRAMUSCULAR; INTRAVENOUS at 00:15

## 2024-11-06 RX ADMIN — VANCOMYCIN HYDROCHLORIDE 125 MILLIGRAM(S): KIT at 23:48

## 2024-11-06 RX ADMIN — VANCOMYCIN HYDROCHLORIDE 125 MILLIGRAM(S): KIT at 11:23

## 2024-11-06 RX ADMIN — GABAPENTIN 300 MILLIGRAM(S): 300 CAPSULE ORAL at 21:48

## 2024-11-06 RX ADMIN — PIPERACILLIN AND TAZOBACTAM 25 GRAM(S): .5; 4 INJECTION, POWDER, LYOPHILIZED, FOR SOLUTION INTRAVENOUS at 13:29

## 2024-11-06 RX ADMIN — PETROLATUM 1 APPLICATION(S): 987.89 OINTMENT TOPICAL at 05:16

## 2024-11-06 RX ADMIN — GABAPENTIN 300 MILLIGRAM(S): 300 CAPSULE ORAL at 13:29

## 2024-11-06 RX ADMIN — Medication 100 MILLIEQUIVALENT(S): at 17:34

## 2024-11-06 RX ADMIN — CHLORHEXIDINE GLUCONATE 15 MILLILITER(S): 40 SOLUTION TOPICAL at 05:17

## 2024-11-06 RX ADMIN — VANCOMYCIN HYDROCHLORIDE 125 MILLIGRAM(S): KIT at 00:15

## 2024-11-06 RX ADMIN — PIPERACILLIN AND TAZOBACTAM 25 GRAM(S): .5; 4 INJECTION, POWDER, LYOPHILIZED, FOR SOLUTION INTRAVENOUS at 21:49

## 2024-11-06 RX ADMIN — VANCOMYCIN HYDROCHLORIDE 125 MILLIGRAM(S): KIT at 17:35

## 2024-11-06 RX ADMIN — Medication 100 MILLIEQUIVALENT(S): at 11:03

## 2024-11-06 RX ADMIN — Medication 100 MILLIEQUIVALENT(S): at 13:28

## 2024-11-06 RX ADMIN — PIPERACILLIN AND TAZOBACTAM 200 GRAM(S): .5; 4 INJECTION, POWDER, LYOPHILIZED, FOR SOLUTION INTRAVENOUS at 10:21

## 2024-11-06 NOTE — PROGRESS NOTE ADULT - SUBJECTIVE AND OBJECTIVE BOX
OTOLARYNGOLOGY (ENT) PROGRESS NOTE    11/5: Patient admitted from emergency room last night. I&D performed by PRS. FACUNDO. Tolerating puree diet.   11/6: Patient seen and examined at bedside. AFVSS. Diet changed to NPO due to concern for connection between intraoral dehiscence and neck.         Vital Signs Last 24 Hrs  T(C): 36.3 (05 Nov 2024 05:46), Max: 36.7 (04 Nov 2024 19:50)  T(F): 97.4 (05 Nov 2024 05:46), Max: 98 (04 Nov 2024 19:50)  HR: 70 (05 Nov 2024 05:46) (66 - 84)  BP: 140/70 (05 Nov 2024 05:46) (102/58 - 140/70)  BP(mean): --  RR: 17 (05 Nov 2024 05:46) (14 - 17)  SpO2: 97% (05 Nov 2024 05:46) (96% - 100%)    Parameters below as of 05 Nov 2024 05:46  Patient On (Oxygen Delivery Method): room air                NAD, resting comfortably in bed   Neck flat and supple, no collection. Left lateral incision with aquacel packing in place  Flap with medial ecchymosis, resolved.  No concern for congestion. Small dehiscence anteriorly   OC/OP: no erythema, bleeding, lacerations  Donor site covered w/ ACE bandage

## 2024-11-06 NOTE — PROGRESS NOTE ADULT - SUBJECTIVE AND OBJECTIVE BOX
Plastic Surgery Progress Note (pg [facility]: [id number], [facility]: [phone])    SUBJECTIVE  The patient was seen and examined. No acute events overnight. Pain controlled, afebrile w/ stable vitals. Still with SS drainage from neck this AM.    OBJECTIVE  ___________________________________________________  VITAL SIGNS / I&O's   Vital Signs Last 24 Hrs  T(C): 36.8 (06 Nov 2024 10:03), Max: 37.3 (05 Nov 2024 17:20)  T(F): 98.3 (06 Nov 2024 10:03), Max: 99.2 (05 Nov 2024 17:20)  HR: 77 (06 Nov 2024 10:03) (71 - 92)  BP: 124/77 (06 Nov 2024 10:03) (124/77 - 152/75)  BP(mean): --  RR: 18 (06 Nov 2024 10:03) (16 - 18)  SpO2: 99% (06 Nov 2024 10:03) (95% - 99%)    Parameters below as of 06 Nov 2024 05:17  Patient On (Oxygen Delivery Method): room air          05 Nov 2024 07:01  -  06 Nov 2024 07:00  --------------------------------------------------------  IN:    IV PiggyBack: 200 mL    Lactated Ringers: 780 mL    Oral Fluid: 10 mL  Total IN: 990 mL    OUT:  Total OUT: 0 mL    Total NET: 990 mL        ___________________________________________________  PHYSICAL EXAM    Gen: NAD, comfortable  HEENT: Diffuse lower face swelling L>R with pitting edema and no erythema. Intraoral flap intact with healthy appearing color and mucosalization. Suture line with 2 pinpoint openings. Aquacel packing clean, replaced. SS drainage from neck incision without intraoral backfill.  Pulm: Breathing comfortably on RA  Ext: LUE distal skin graft dehiscence with area of nonviable tissue. No signs of infection, no expressible fluid. WTD dressing replaced  LLE: Skin graft donor site with some nonviable tissue in the wound base.   PSYCH: Affect normal, A&Ox3    ___________________________________________________  LABS                        9.4    8.39  )-----------( 279      ( 06 Nov 2024 05:49 )             28.8     06 Nov 2024 05:49    141    |  103    |  6      ----------------------------<  98     3.4     |  26     |  0.70     Ca    8.2        06 Nov 2024 05:49  Phos  2.9       06 Nov 2024 05:49  Mg     2.00      06 Nov 2024 05:49        CAPILLARY BLOOD GLUCOSE            Urinalysis Basic - ( 06 Nov 2024 05:49 )    Color: x / Appearance: x / SG: x / pH: x  Gluc: 98 mg/dL / Ketone: x  / Bili: x / Urobili: x   Blood: x / Protein: x / Nitrite: x   Leuk Esterase: x / RBC: x / WBC x   Sq Epi: x / Non Sq Epi: x / Bacteria: x      ___________________________________________________  MICRO  Recent Cultures:  Specimen Source: .Abscess, 11-04 @ 16:24; Results   Few Staphylococcus capitis "Susceptibilities not performed"[!]; Gram Stain:   Few polymorphonuclear leukocytes per low power field  No organisms seen per oil power field[!]; Organism: --  Specimen Source: .Abscess, 11-04 @ 12:54; Results   Few Pseudomonas aeruginosa[!]; Gram Stain:   Moderate polymorphonuclear leukocytes per low power field  Few Gram positive cocci in pairs per oil power field[!]; Organism: --  Specimen Source: Drainage, 11-04 @ 10:18; Results   Rare Pseudomonas aeruginosa[!]; Gram Stain: --; Organism: Pseudomonas aeruginosa[!]    ___________________________________________________  MEDICATIONS  (STANDING):  AQUAPHOR (petrolatum Ointment) 1 Application(s) Topical two times a day  chlorhexidine 0.12% Liquid 15 milliLiter(s) Oral Mucosa two times a day  dextrose 5% + sodium chloride 0.45%. 1000 milliLiter(s) (75 mL/Hr) IV Continuous <Continuous>  gabapentin Solution 300 milliGRAM(s) Oral three times a day  piperacillin/tazobactam IVPB.- 3.375 Gram(s) IV Intermittent once  piperacillin/tazobactam IVPB.. 3.375 Gram(s) IV Intermittent every 8 hours  potassium chloride  10 mEq/100 mL IVPB 10 milliEquivalent(s) IV Intermittent every 1 hour  vancomycin    Solution 125 milliGRAM(s) Oral every 6 hours    MEDICATIONS  (PRN):  acetaminophen   Oral Liquid .. 650 milliGRAM(s) Oral every 6 hours PRN Mild Pain (1 - 3)

## 2024-11-07 LAB
ANION GAP SERPL CALC-SCNC: 13 MMOL/L — SIGNIFICANT CHANGE UP (ref 7–14)
BUN SERPL-MCNC: 4 MG/DL — LOW (ref 7–23)
CALCIUM SERPL-MCNC: 8.2 MG/DL — LOW (ref 8.4–10.5)
CHLORIDE SERPL-SCNC: 103 MMOL/L — SIGNIFICANT CHANGE UP (ref 98–107)
CO2 SERPL-SCNC: 26 MMOL/L — SIGNIFICANT CHANGE UP (ref 22–31)
CREAT SERPL-MCNC: 0.76 MG/DL — SIGNIFICANT CHANGE UP (ref 0.5–1.3)
EGFR: 94 ML/MIN/1.73M2 — SIGNIFICANT CHANGE UP
GLUCOSE SERPL-MCNC: 134 MG/DL — HIGH (ref 70–99)
HCT VFR BLD CALC: 29.9 % — LOW (ref 39–50)
HGB BLD-MCNC: 9.7 G/DL — LOW (ref 13–17)
MAGNESIUM SERPL-MCNC: 2.1 MG/DL — SIGNIFICANT CHANGE UP (ref 1.6–2.6)
MCHC RBC-ENTMCNC: 29.3 PG — SIGNIFICANT CHANGE UP (ref 27–34)
MCHC RBC-ENTMCNC: 32.4 G/DL — SIGNIFICANT CHANGE UP (ref 32–36)
MCV RBC AUTO: 90.3 FL — SIGNIFICANT CHANGE UP (ref 80–100)
NRBC # BLD: 0 /100 WBCS — SIGNIFICANT CHANGE UP (ref 0–0)
NRBC # FLD: 0 K/UL — SIGNIFICANT CHANGE UP (ref 0–0)
PHOSPHATE SERPL-MCNC: 2.9 MG/DL — SIGNIFICANT CHANGE UP (ref 2.5–4.5)
PLATELET # BLD AUTO: 288 K/UL — SIGNIFICANT CHANGE UP (ref 150–400)
POTASSIUM SERPL-MCNC: 3.4 MMOL/L — LOW (ref 3.5–5.3)
POTASSIUM SERPL-SCNC: 3.4 MMOL/L — LOW (ref 3.5–5.3)
RBC # BLD: 3.31 M/UL — LOW (ref 4.2–5.8)
RBC # FLD: 14.1 % — SIGNIFICANT CHANGE UP (ref 10.3–14.5)
SODIUM SERPL-SCNC: 142 MMOL/L — SIGNIFICANT CHANGE UP (ref 135–145)
WBC # BLD: 5.27 K/UL — SIGNIFICANT CHANGE UP (ref 3.8–10.5)
WBC # FLD AUTO: 5.27 K/UL — SIGNIFICANT CHANGE UP (ref 3.8–10.5)

## 2024-11-07 RX ORDER — POTASSIUM CHLORIDE 10 MEQ
10 TABLET, EXTENDED RELEASE ORAL
Refills: 0 | Status: COMPLETED | OUTPATIENT
Start: 2024-11-07 | End: 2024-11-07

## 2024-11-07 RX ADMIN — PIPERACILLIN AND TAZOBACTAM 25 GRAM(S): .5; 4 INJECTION, POWDER, LYOPHILIZED, FOR SOLUTION INTRAVENOUS at 05:49

## 2024-11-07 RX ADMIN — GABAPENTIN 300 MILLIGRAM(S): 300 CAPSULE ORAL at 05:49

## 2024-11-07 RX ADMIN — VANCOMYCIN HYDROCHLORIDE 125 MILLIGRAM(S): KIT at 11:19

## 2024-11-07 RX ADMIN — Medication 100 MILLIEQUIVALENT(S): at 18:17

## 2024-11-07 RX ADMIN — VANCOMYCIN HYDROCHLORIDE 125 MILLIGRAM(S): KIT at 17:54

## 2024-11-07 RX ADMIN — VANCOMYCIN HYDROCHLORIDE 125 MILLIGRAM(S): KIT at 05:50

## 2024-11-07 RX ADMIN — CHLORHEXIDINE GLUCONATE 15 MILLILITER(S): 40 SOLUTION TOPICAL at 17:55

## 2024-11-07 RX ADMIN — Medication 100 MILLIEQUIVALENT(S): at 12:51

## 2024-11-07 RX ADMIN — PETROLATUM 1 APPLICATION(S): 987.89 OINTMENT TOPICAL at 05:50

## 2024-11-07 RX ADMIN — CHLORHEXIDINE GLUCONATE 15 MILLILITER(S): 40 SOLUTION TOPICAL at 05:50

## 2024-11-07 RX ADMIN — PETROLATUM 1 APPLICATION(S): 987.89 OINTMENT TOPICAL at 17:55

## 2024-11-07 RX ADMIN — GABAPENTIN 300 MILLIGRAM(S): 300 CAPSULE ORAL at 22:11

## 2024-11-07 RX ADMIN — PIPERACILLIN AND TAZOBACTAM 25 GRAM(S): .5; 4 INJECTION, POWDER, LYOPHILIZED, FOR SOLUTION INTRAVENOUS at 22:11

## 2024-11-07 NOTE — PROGRESS NOTE ADULT - SUBJECTIVE AND OBJECTIVE BOX
OTOLARYNGOLOGY (ENT) PROGRESS NOTE    11/5: Patient admitted from emergency room last night. I&D performed by PRS. FACUNDO. Tolerating puree diet.   11/6: Patient seen and examined at bedside. AFVSS. Diet changed to NPO due to concern for connection between intraoral dehiscence and neck.   11/7: Patient seen and examined at bedside. AFVSS. Remains NPO. Reports decreased drainage in mouth      Vital Signs Last 24 Hrs  T(C): 36.5 (07 Nov 2024 06:00), Max: 36.8 (06 Nov 2024 10:03)  T(F): 97.7 (07 Nov 2024 06:00), Max: 98.3 (06 Nov 2024 10:03)  HR: 68 (07 Nov 2024 06:00) (64 - 77)  BP: 142/77 (07 Nov 2024 06:00) (124/77 - 149/64)  BP(mean): --  RR: 18 (07 Nov 2024 06:00) (16 - 18)  SpO2: 95% (07 Nov 2024 06:00) (95% - 99%)    Parameters below as of 07 Nov 2024 06:00  Patient On (Oxygen Delivery Method): room air                  NAD, resting comfortably in bed   Neck flat and supple, no collection. Left lateral incision with aquacel packing in place  Flap with medial ecchymosis, resolved.  No concern for congestion. Small dehiscence anteriorly - no drainage on exam this morning  OC/OP: no erythema, bleeding, lacerations  Donor site covered w/ ACE bandage

## 2024-11-07 NOTE — PROGRESS NOTE ADULT - SUBJECTIVE AND OBJECTIVE BOX
Plastic Surgery Progress Note (pg [facility]: [id number], [facility]: [phone])    SUBJECTIVE  The patient was seen and examined. No acute events overnight. Pain controlled, afebrile w/ stable vitals.     OBJECTIVE  ___________________________________________________  VITAL SIGNS / I&O's   Vital Signs Last 24 Hrs  T(C): 36.5 (07 Nov 2024 06:00), Max: 36.8 (06 Nov 2024 10:03)  T(F): 97.7 (07 Nov 2024 06:00), Max: 98.3 (06 Nov 2024 10:03)  HR: 68 (07 Nov 2024 06:00) (64 - 77)  BP: 142/77 (07 Nov 2024 06:00) (124/77 - 149/64)  BP(mean): --  RR: 18 (07 Nov 2024 06:00) (16 - 18)  SpO2: 95% (07 Nov 2024 06:00) (95% - 99%)    Parameters below as of 07 Nov 2024 06:00  Patient On (Oxygen Delivery Method): room air          06 Nov 2024 07:01  -  07 Nov 2024 07:00  --------------------------------------------------------  IN:    dextrose 5% + sodium chloride 0.45%: 1395 mL    IV PiggyBack: 700 mL  Total IN: 2095 mL    OUT:    Oral Fluid: 0 mL  Total OUT: 0 mL    Total NET: 2095 mL        ___________________________________________________  PHYSICAL EXAM    Gen: NAD, comfortable  HEENT: Diffuse lower face swelling L>R with pitting edema and no erythema. Intraoral flap intact with healthy appearing color and mucosalization. Suture line without visible openings. Aquacel packing clean, replaced. SS drainage from neck incision without intraoral backfill.  Pulm: Breathing comfortably on RA  Ext: LUE distal skin graft dehiscence with area of nonviable tissue. No signs of infection, no expressible fluid. WTD dressing replaced  LLE: Skin graft donor site with some nonviable tissue in the wound base.   PSYCH: Affect normal, A&Ox3    ___________________________________________________  LABS                        9.7    5.27  )-----------( 288      ( 07 Nov 2024 06:39 )             29.9     06 Nov 2024 05:49    141    |  103    |  6      ----------------------------<  98     3.4     |  26     |  0.70     Ca    8.2        06 Nov 2024 05:49  Phos  2.9       06 Nov 2024 05:49  Mg     2.00      06 Nov 2024 05:49        CAPILLARY BLOOD GLUCOSE            Urinalysis Basic - ( 06 Nov 2024 05:49 )    Color: x / Appearance: x / SG: x / pH: x  Gluc: 98 mg/dL / Ketone: x  / Bili: x / Urobili: x   Blood: x / Protein: x / Nitrite: x   Leuk Esterase: x / RBC: x / WBC x   Sq Epi: x / Non Sq Epi: x / Bacteria: x      ___________________________________________________  MICRO  Recent Cultures:  Specimen Source: .Abscess, 11-04 @ 16:24; Results   Few Staphylococcus capitis "Susceptibilities not performed"[!]; Gram Stain:   Few polymorphonuclear leukocytes per low power field  No organisms seen per oil power field[!]; Organism: --  Specimen Source: .Abscess, 11-04 @ 12:54; Results   Few Pseudomonas aeruginosa  Few Prevotella buccae "Susceptibilities not performed"  Few Commensal helene consistent with body site[!]; Gram Stain:   Moderate polymorphonuclear leukocytes per low power field  Few Gram positive cocci in pairs per oil power field[!]; Organism: Pseudomonas aeruginosa[!]  Specimen Source: Drainage, 11-04 @ 10:18; Results   Rare Pseudomonas aeruginosa  Rare Prevotella buccae "Susceptibilities not performed"  Few Prevotella nanceiensis "Susceptibilities not performed"  Commensal helene consistent with body site[!]; Gram Stain: --; Organism: Pseudomonas aeruginosa[!]    ___________________________________________________  MEDICATIONS  (STANDING):  AQUAPHOR (petrolatum Ointment) 1 Application(s) Topical two times a day  chlorhexidine 0.12% Liquid 15 milliLiter(s) Oral Mucosa two times a day  dextrose 5% + sodium chloride 0.45%. 1000 milliLiter(s) (75 mL/Hr) IV Continuous <Continuous>  gabapentin Solution 300 milliGRAM(s) Oral three times a day  piperacillin/tazobactam IVPB.. 3.375 Gram(s) IV Intermittent every 8 hours  vancomycin    Solution 125 milliGRAM(s) Oral every 6 hours    MEDICATIONS  (PRN):  acetaminophen   Oral Liquid .. 650 milliGRAM(s) Oral every 6 hours PRN Mild Pain (1 - 3)

## 2024-11-08 DIAGNOSIS — C03.9 MALIGNANT NEOPLASM OF GUM, UNSPECIFIED: ICD-10-CM

## 2024-11-08 DIAGNOSIS — A04.72 ENTEROCOLITIS DUE TO CLOSTRIDIUM DIFFICILE, NOT SPECIFIED AS RECURRENT: ICD-10-CM

## 2024-11-08 LAB
ANION GAP SERPL CALC-SCNC: 12 MMOL/L — SIGNIFICANT CHANGE UP (ref 7–14)
BUN SERPL-MCNC: 3 MG/DL — LOW (ref 7–23)
CALCIUM SERPL-MCNC: 8.4 MG/DL — SIGNIFICANT CHANGE UP (ref 8.4–10.5)
CHLORIDE SERPL-SCNC: 105 MMOL/L — SIGNIFICANT CHANGE UP (ref 98–107)
CO2 SERPL-SCNC: 25 MMOL/L — SIGNIFICANT CHANGE UP (ref 22–31)
CREAT SERPL-MCNC: 0.82 MG/DL — SIGNIFICANT CHANGE UP (ref 0.5–1.3)
EGFR: 92 ML/MIN/1.73M2 — SIGNIFICANT CHANGE UP
GLUCOSE SERPL-MCNC: 130 MG/DL — HIGH (ref 70–99)
HCT VFR BLD CALC: 31.8 % — LOW (ref 39–50)
HGB BLD-MCNC: 10.1 G/DL — LOW (ref 13–17)
MAGNESIUM SERPL-MCNC: 2.1 MG/DL — SIGNIFICANT CHANGE UP (ref 1.6–2.6)
MCHC RBC-ENTMCNC: 29.4 PG — SIGNIFICANT CHANGE UP (ref 27–34)
MCHC RBC-ENTMCNC: 31.8 G/DL — LOW (ref 32–36)
MCV RBC AUTO: 92.4 FL — SIGNIFICANT CHANGE UP (ref 80–100)
NRBC # BLD: 0 /100 WBCS — SIGNIFICANT CHANGE UP (ref 0–0)
NRBC # FLD: 0 K/UL — SIGNIFICANT CHANGE UP (ref 0–0)
PHOSPHATE SERPL-MCNC: 2.6 MG/DL — SIGNIFICANT CHANGE UP (ref 2.5–4.5)
PLATELET # BLD AUTO: 280 K/UL — SIGNIFICANT CHANGE UP (ref 150–400)
POTASSIUM SERPL-MCNC: 3.5 MMOL/L — SIGNIFICANT CHANGE UP (ref 3.5–5.3)
POTASSIUM SERPL-SCNC: 3.5 MMOL/L — SIGNIFICANT CHANGE UP (ref 3.5–5.3)
RBC # BLD: 3.44 M/UL — LOW (ref 4.2–5.8)
RBC # FLD: 14.3 % — SIGNIFICANT CHANGE UP (ref 10.3–14.5)
SODIUM SERPL-SCNC: 142 MMOL/L — SIGNIFICANT CHANGE UP (ref 135–145)
WBC # BLD: 5.02 K/UL — SIGNIFICANT CHANGE UP (ref 3.8–10.5)
WBC # FLD AUTO: 5.02 K/UL — SIGNIFICANT CHANGE UP (ref 3.8–10.5)

## 2024-11-08 PROCEDURE — 99223 1ST HOSP IP/OBS HIGH 75: CPT

## 2024-11-08 RX ORDER — POTASSIUM CHLORIDE 10 MEQ
10 TABLET, EXTENDED RELEASE ORAL
Refills: 0 | Status: COMPLETED | OUTPATIENT
Start: 2024-11-08 | End: 2024-11-08

## 2024-11-08 RX ORDER — VANCOMYCIN HYDROCHLORIDE 50 MG/ML
125 KIT ORAL EVERY 6 HOURS
Refills: 0 | Status: DISCONTINUED | OUTPATIENT
Start: 2024-11-08 | End: 2024-11-11

## 2024-11-08 RX ADMIN — PIPERACILLIN AND TAZOBACTAM 25 GRAM(S): .5; 4 INJECTION, POWDER, LYOPHILIZED, FOR SOLUTION INTRAVENOUS at 22:13

## 2024-11-08 RX ADMIN — VANCOMYCIN HYDROCHLORIDE 125 MILLIGRAM(S): KIT at 17:51

## 2024-11-08 RX ADMIN — GABAPENTIN 300 MILLIGRAM(S): 300 CAPSULE ORAL at 05:55

## 2024-11-08 RX ADMIN — CHLORHEXIDINE GLUCONATE 15 MILLILITER(S): 40 SOLUTION TOPICAL at 17:51

## 2024-11-08 RX ADMIN — Medication 100 MILLIEQUIVALENT(S): at 11:48

## 2024-11-08 RX ADMIN — GABAPENTIN 300 MILLIGRAM(S): 300 CAPSULE ORAL at 13:46

## 2024-11-08 RX ADMIN — VANCOMYCIN HYDROCHLORIDE 125 MILLIGRAM(S): KIT at 23:58

## 2024-11-08 RX ADMIN — Medication 100 MILLIEQUIVALENT(S): at 10:48

## 2024-11-08 RX ADMIN — CHLORHEXIDINE GLUCONATE 15 MILLILITER(S): 40 SOLUTION TOPICAL at 05:55

## 2024-11-08 RX ADMIN — PIPERACILLIN AND TAZOBACTAM 25 GRAM(S): .5; 4 INJECTION, POWDER, LYOPHILIZED, FOR SOLUTION INTRAVENOUS at 13:46

## 2024-11-08 RX ADMIN — GABAPENTIN 300 MILLIGRAM(S): 300 CAPSULE ORAL at 22:13

## 2024-11-08 RX ADMIN — VANCOMYCIN HYDROCHLORIDE 125 MILLIGRAM(S): KIT at 13:46

## 2024-11-08 RX ADMIN — PETROLATUM 1 APPLICATION(S): 987.89 OINTMENT TOPICAL at 05:55

## 2024-11-08 RX ADMIN — PIPERACILLIN AND TAZOBACTAM 25 GRAM(S): .5; 4 INJECTION, POWDER, LYOPHILIZED, FOR SOLUTION INTRAVENOUS at 05:54

## 2024-11-08 RX ADMIN — PETROLATUM 1 APPLICATION(S): 987.89 OINTMENT TOPICAL at 17:51

## 2024-11-08 NOTE — SWALLOW BEDSIDE ASSESSMENT ADULT - SPECIFY REASON(S)
Green Dye Swallow Assessment to rule out leak Green Dye Swallow Assessment to rule out leak "to concern for connection between intraoral dehiscence and neck" (per ENT note) Green Dye Swallow Assessment to rule out leak given "concern for connection between intraoral dehiscence and neck" (per ENT note)

## 2024-11-08 NOTE — CONSULT NOTE ADULT - SUBJECTIVE AND OBJECTIVE BOX
HPI:  73 years old male with a PMHx of peripheral neuropathy, microdiscectomy, eczema and SCC of left gingivolabial sulcus s/p L SND 1-3, excision of gingival lesion and marginal mandibulectomy, L RFFF w/ STSG from L thigh on 10/24. Patient developed C diff diarrhea and was started on po vanco on 10/29.  Patient was discharged on 11/1/2024 and came back to ED this morning concerns for discharged from his mouth. Reported left neck is street than yesterday. PRS saw and noticed some dehiscence from the left arm flap, started wet to dry. CT scan performed showing collection at surgical site now s/p I&D by PRS team.    Pt continues to have diarrhea of the same consistency since it started - watery with small 'chunks' of fecal matter.  pt does not feel there has been any improvement/worsening of the diarrhea since it started despite being compliant with po vanco.        PAST MEDICAL & SURGICAL HISTORY:  Lumbar herniated disc  Contact dermatitis  Eczema  H/O neuropathy left foot  Primary cancer of gingival mucosa  S/P vasectomy 1992  S/P arthroscopy of left knee 2013  History of microdiscectomy lumbar 7/7/2015  H/O excision of mass mouth 2017  History of mandibular surgery      Review of Systems:   CONSTITUTIONAL: No fever, weight loss, or fatigue  EYES: No eye pain, visual disturbances, or discharge  ENMT:  No difficulty hearing, tinnitus, vertigo; No sinus or throat pain  NECK: No pain or stiffness  RESPIRATORY: No cough, wheezing, chills or hemoptysis; No shortness of breath  CARDIOVASCULAR: No chest pain, palpitations, dizziness, or leg swelling  GASTROINTESTINAL: No abdominal or epigastric pain. No nausea, vomiting, or hematemesis; No diarrhea or constipation. No melena or hematochezia.  GENITOURINARY: No dysuria, frequency, hematuria, or incontinence  NEUROLOGICAL: No headaches, memory loss, loss of strength, numbness, or tremors  SKIN: No itching, burning, rashes, or lesions   MUSCULOSKELETAL: No joint pain or swelling; No muscle, back, or extremity pain  PSYCHIATRIC: No depression, anxiety, mood swings, or difficulty sleeping  HEME/LYMPH: No easy bruising, or bleeding gums  ALLERY AND IMMUNOLOGIC: No hives or eczema    Allergies    latex (Rash)  No Known Drug Allergies  Intolerances    Social History:     FAMILY HISTORY:  Family history of Parkinson's disease (Father)    Family history of leukemia (Sibling)  sister    MEDICATIONS  (STANDING):  AQUAPHOR (petrolatum Ointment) 1 Application(s) Topical two times a day  chlorhexidine 0.12% Liquid 15 milliLiter(s) Oral Mucosa two times a day  dextrose 5% + sodium chloride 0.45%. 1000 milliLiter(s) (75 mL/Hr) IV Continuous <Continuous>  gabapentin Solution 300 milliGRAM(s) Oral three times a day  piperacillin/tazobactam IVPB.. 3.375 Gram(s) IV Intermittent every 8 hours    MEDICATIONS  (PRN):  acetaminophen   Oral Liquid .. 650 milliGRAM(s) Oral every 6 hours PRN Mild Pain (1 - 3)    CAPILLARY BLOOD GLUCOSE    I&O's Summary    07 Nov 2024 07:01  -  08 Nov 2024 07:00  --------------------------------------------------------  IN: 2100 mL / OUT: 0 mL / NET: 2100 mL      PHYSICAL EXAM:  GENERAL: NAD, well-developed  HEAD:  Atraumatic, Normocephalic  EYES: EOMI, conjunctiva and sclera clear  NECK: L neck swelling with dressing c/d/i  CHEST/LUNG: Clear to auscultation bilaterally; No wheeze  HEART: Regular rate and rhythm; No murmurs  ABDOMEN: Soft, Nontender, Nondistended; Bowel sounds present  EXTREMITIES:  2+ Peripheral Pulses, No edema, LUE in ACE wrap   PSYCH: AAOx3  NEUROLOGY: non-focal  SKIN: No rashes or lesions    LABS:                        10.1   5.02  )-----------( 280      ( 08 Nov 2024 06:22 )             31.8     11-08    142  |  105  |  3[L]  ----------------------------<  130[H]  3.5   |  25  |  0.82    Ca    8.4      08 Nov 2024 06:22  Phos  2.6     11-08  Mg     2.10     11-08            Urinalysis Basic - ( 08 Nov 2024 06:22 )    Color: x / Appearance: x / SG: x / pH: x  Gluc: 130 mg/dL / Ketone: x  / Bili: x / Urobili: x   Blood: x / Protein: x / Nitrite: x   Leuk Esterase: x / RBC: x / WBC x   Sq Epi: x / Non Sq Epi: x / Bacteria: x        RADIOLOGY & ADDITIONAL TESTS:    Imaging Personally Reviewed:    Consultant(s) Notes Reviewed:      Care Discussed with Consultants/Other Providers:

## 2024-11-08 NOTE — PROGRESS NOTE ADULT - SUBJECTIVE AND OBJECTIVE BOX
Plastic Surgery Progress Note (pg [facility]: [id number], [facility]: [phone])    SUBJECTIVE  The patient was seen and examined. No acute events overnight. Pain controlled, afebrile w/ stable vitals. Dye test today    OBJECTIVE  ___________________________________________________  VITAL SIGNS / I&O's   Vital Signs Last 24 Hrs  T(C): 36.3 (08 Nov 2024 09:52), Max: 36.7 (07 Nov 2024 14:00)  T(F): 97.3 (08 Nov 2024 09:52), Max: 98.1 (07 Nov 2024 14:00)  HR: 72 (08 Nov 2024 09:52) (69 - 76)  BP: 120/67 (08 Nov 2024 09:52) (117/84 - 132/68)  BP(mean): --  RR: 18 (08 Nov 2024 09:52) (16 - 18)  SpO2: 98% (08 Nov 2024 09:52) (97% - 99%)    Parameters below as of 08 Nov 2024 05:53  Patient On (Oxygen Delivery Method): room air          07 Nov 2024 07:01  -  08 Nov 2024 07:00  --------------------------------------------------------  IN:    dextrose 5% + sodium chloride 0.45%: 1800 mL    IV PiggyBack: 300 mL  Total IN: 2100 mL    OUT:    Oral Fluid: 0 mL  Total OUT: 0 mL    Total NET: 2100 mL        ___________________________________________________  PHYSICAL EXAM    Gen: NAD, comfortable  HEENT: Improving diffuse lower face swelling L>R, nonerythematous. Intraoral flap intact with healthy appearing color and mucosalization. Suture line without visible openings. Aquacel packing clean, replaced.  Pulm: Breathing comfortably on RA  Ext: LUE distal skin graft dehiscence with area of nonviable tissue. No signs of infection, no expressible fluid. WTD dressing replaced  PSYCH: Affect normal, A&Ox3      ___________________________________________________  LABS                        10.1   5.02  )-----------( 280      ( 08 Nov 2024 06:22 )             31.8     08 Nov 2024 06:22    142    |  105    |  3      ----------------------------<  130    3.5     |  25     |  0.82     Ca    8.4        08 Nov 2024 06:22  Phos  2.6       08 Nov 2024 06:22  Mg     2.10      08 Nov 2024 06:22        CAPILLARY BLOOD GLUCOSE            Urinalysis Basic - ( 08 Nov 2024 06:22 )    Color: x / Appearance: x / SG: x / pH: x  Gluc: 130 mg/dL / Ketone: x  / Bili: x / Urobili: x   Blood: x / Protein: x / Nitrite: x   Leuk Esterase: x / RBC: x / WBC x   Sq Epi: x / Non Sq Epi: x / Bacteria: x      ___________________________________________________  MICRO  Recent Cultures:  Specimen Source: .Abscess, 11-04 @ 16:24; Results   Few Staphylococcus capitis "Susceptibilities not performed"[!]; Gram Stain:   Few polymorphonuclear leukocytes per low power field  No organisms seen per oil power field[!]; Organism: --  Specimen Source: .Abscess, 11-04 @ 12:54; Results   Few Pseudomonas aeruginosa  Few Prevotella buccae "Susceptibilities not performed"  Few Commensal helene consistent with body site[!]; Gram Stain:   Moderate polymorphonuclear leukocytes per low power field  Few Gram positive cocci in pairs per oil power field[!]; Organism: Pseudomonas aeruginosa[!]  Specimen Source: Drainage, 11-04 @ 10:18; Results   Rare Pseudomonas aeruginosa  Rare Prevotella buccae "Susceptibilities not performed"  Few Prevotella nanceiensis "Susceptibilities not performed"  Commensal helene consistent with body site[!]; Gram Stain: --; Organism: Pseudomonas aeruginosa[!]    ___________________________________________________  MEDICATIONS  (STANDING):  AQUAPHOR (petrolatum Ointment) 1 Application(s) Topical two times a day  chlorhexidine 0.12% Liquid 15 milliLiter(s) Oral Mucosa two times a day  dextrose 5% + sodium chloride 0.45%. 1000 milliLiter(s) (75 mL/Hr) IV Continuous <Continuous>  gabapentin Solution 300 milliGRAM(s) Oral three times a day  piperacillin/tazobactam IVPB.. 3.375 Gram(s) IV Intermittent every 8 hours  potassium chloride  10 mEq/100 mL IVPB 10 milliEquivalent(s) IV Intermittent every 1 hour    MEDICATIONS  (PRN):  acetaminophen   Oral Liquid .. 650 milliGRAM(s) Oral every 6 hours PRN Mild Pain (1 - 3)

## 2024-11-08 NOTE — SWALLOW BEDSIDE ASSESSMENT ADULT - SWALLOW EVAL: RECOMMENDED FEEDING/EATING TECHNIQUES
small single cup sips; slow rate of intake; upright with PO intake and at least 30 minutes following/oral hygiene

## 2024-11-08 NOTE — CONSULT NOTE ADULT - PROBLEM SELECTOR RECOMMENDATION 9
- Culture obtained at bedside, sent to lab  - Please obtain CT maxillofacial and neck with IV contrast  - Please keep patient NPO at this time while waiting for the image  - PRS input appreciated  - Case discussed with Dr. Perez
- pt on po vanco since 10/29 - would continue for 14 day course (finish 11/11)  - monitor stool outpt   - would not send another C diff sample at this time as results can remain positive for long time  - diarrhea may be exacerbated by current antibiotics regimen as well

## 2024-11-08 NOTE — SWALLOW BEDSIDE ASSESSMENT ADULT - COMMENTS
ENT 11/8, "73 years old male with a PMHx of peripheral neuropathy, microdiscectomy, eczema and SCC of left gingivolabial sulcus s/p L SND 1-3, excision of gingival lesion and marginal mandibulectomy, L RFFF w/ STSG from L thigh on 10/24. Patient was discharged on 11/1/2024, presented with dehiscence at left arm flap harvest site and with dehiscence in left floor of mouth w/ collection in L neck now s/p I&D w/ aquacel packing placed by PRS 11/4. 11/5: Patient admitted from emergency room last night. I&D performed by PRS. AFVSS. Tolerating puree diet. 11/6: Patient seen and examined at bedside. AFVSS. Diet changed to NPO due to concern for connection between intraoral dehiscence and neck. 11/7: Patient seen and examined at bedside. AFVSS. Remains NPO. Reports decreased drainage in mouth 11/8: Patient seen and examined at bedside. AFVSS. 1 episode of diarrhea yesterday morning, none since. Refused vitals overnight."    Plastics 11/8, "AUSTYN LINTON is a 73yMale s/p left FOM resection for SCC of left gingivolabial sulcus with left radial forearm free flap and STSG from left thigh 10/24, discharged 11/01, presented to ED 11/04 with SS drainage from intraoral incision and LUE wound dehiscence.- Dye test today to assess for leaks"      Order received from ENT requesting a Green Dye Swallow Assessment given concern for dehiscence to rule out leak. Per order, to be completed with Plastic Surgery at bedside to change wound packing. SLP coordinated with Plastic Surgery. Patient received OOB in bedside chair, awake/ alert. Patient reports previously tolerating puree with thin liquids without any overt s/s penetration/ aspiration. SLP explained purpose of assessment and Plastic Surgery removed packing from left side of neck prior to PO trials. Per discussion with ENT, SLP to provide thin liquids (water impregnated with green food coloring only). ENT 11/8, "73 years old male with a PMHx of peripheral neuropathy, microdiscectomy, eczema and SCC of left gingivolabial sulcus s/p L SND 1-3, excision of gingival lesion and marginal mandibulectomy, L RFFF w/ STSG from L thigh on 10/24. Patient was discharged on 11/1/2024, presented with dehiscence at left arm flap harvest site and with dehiscence in left floor of mouth w/ collection in L neck now s/p I&D w/ aquacel packing placed by PRS 11/4. 11/5: Patient admitted from emergency room last night. I&D performed by PRS. AFVSS. Tolerating puree diet. 11/6: Patient seen and examined at bedside. AFVSS. Diet changed to NPO due to concern for connection between intraoral dehiscence and neck. 11/7: Patient seen and examined at bedside. AFVSS. Remains NPO. Reports decreased drainage in mouth 11/8: Patient seen and examined at bedside. AFVSS. 1 episode of diarrhea yesterday morning, none since. Refused vitals overnight."    Plastics 11/8, "AUSTYN LINTON is a 73yMale s/p left FOM resection for SCC of left gingivolabial sulcus with left radial forearm free flap and STSG from left thigh 10/24, discharged 11/01, presented to ED 11/04 with SS drainage from intraoral incision and LUE wound dehiscence.- Dye test today to assess for leaks"      Order received from ENT requesting a Green Dye Swallow Assessment given concern for dehiscence to rule out leak. Per order, to be completed with Plastic Surgery at bedside to change wound packing. SLP coordinated with Plastic Surgery. Patient received OOB in bedside chair, awake/ alert. Patient reports previously tolerating puree with thin liquids without any overt s/s penetration/ aspiration. SLP explained purpose of assessment and Plastic Surgery removed packing from left side of neck prior to PO trials. Per discussion with ENT, SLP to provide thin liquids (water impregnated with green food coloring only) given concern for dehiscence to rule out leak.

## 2024-11-08 NOTE — CONSULT NOTE ADULT - PROBLEM SELECTOR RECOMMENDATION 2
- management as per ENT and plastics   - follow up cultures  - continue with antibiotics as per primary team

## 2024-11-08 NOTE — SWALLOW BEDSIDE ASSESSMENT ADULT - CONSISTENCIES ADMINISTERED
no additional PO trials provided as ENT provided clearance for thin liquids only 4 oz impregnated with green food color dye/thin liquid

## 2024-11-08 NOTE — SWALLOW BEDSIDE ASSESSMENT ADULT - ADDITIONAL RECOMMENDATIONS
1. Plan of care per ENT/ Plastic Surgery. 2. This service to follow as schedule permits. 3. Medical team advised to reconsult this service if patient is with a change in medical status or change in tolerance of recommended PO. 1. Plan of care per ENT/ Plastic Surgery. 2. Medical team advised to reconsult this service if patient is with a change in medical status or change in tolerance of recommended PO.

## 2024-11-08 NOTE — PROGRESS NOTE ADULT - SUBJECTIVE AND OBJECTIVE BOX
OTOLARYNGOLOGY (ENT) PROGRESS NOTE    11/5: Patient admitted from emergency room last night. I&D performed by PRS. EDIEVSS. Tolerating puree diet.   11/6: Patient seen and examined at bedside. AFVSS. Diet changed to NPO due to concern for connection between intraoral dehiscence and neck.   11/7: Patient seen and examined at bedside. AFVSS. Remains NPO. Reports decreased drainage in mouth  11/8: Patient seen and examined at bedside. AFVSS. 1 episode of diarrhea yesterday morning, none since. Refused vitals overnight.    Vital Signs Last 24 Hrs  T(C): 36.5 (08 Nov 2024 05:53), Max: 36.7 (07 Nov 2024 14:00)  T(F): 97.7 (08 Nov 2024 05:53), Max: 98.1 (07 Nov 2024 14:00)  HR: 69 (08 Nov 2024 05:53) (66 - 76)  BP: 126/73 (08 Nov 2024 05:53) (117/84 - 132/68)  BP(mean): --  RR: 18 (08 Nov 2024 05:53) (16 - 18)  SpO2: 98% (08 Nov 2024 05:53) (96% - 99%)    Parameters below as of 08 Nov 2024 05:53  Patient On (Oxygen Delivery Method): room air              NAD, resting comfortably in bed   Neck flat and supple, no collection. Left lateral incision with aquacel packing in place  Flap with medial ecchymosis, resolved.  No concern for congestion. Small dehiscence anteriorly - appears closed  OC/OP: no erythema, bleeding, lacerations

## 2024-11-08 NOTE — CONSULT NOTE ADULT - ASSESSMENT
73M with peripheral neuropathy, microdiscectomy, eczema and SCC of left gingivolabial sulcus s/p L SND 1-3, excision of gingival lesion and marginal mandibulectomy, L RFFF w/ STSG from L thigh on 10/24. Patient developed C diff diarrhea and was started on po vanco on 10/29.  Patient was discharged on 11/1/2024 and came back to [facility] on 11/4 with increased discharge from his mouth. Reported left neck is street than yesterday. PRS saw and noticed some dehiscence from the left arm flap, started wet to dry. CT scan performed showing collection at surgical site now s/p I&D by PRS team.  Pt continues to have diarrhea of the same consistency since it started - watery with small 'chunks' of fecal matter.  pt does not feel there has been any improvement/worsening of the diarrhea since it started despite being compliant with po vanco.

## 2024-11-08 NOTE — SWALLOW BEDSIDE ASSESSMENT ADULT - SWALLOW EVAL: RECOMMENDED DIET
1. Thin Liquid Diet/ Clear Liquid diet and initiate/ advance PO diet at ENT discretion/ clearance given concern for dehiscence

## 2024-11-08 NOTE — SWALLOW BEDSIDE ASSESSMENT ADULT - SWALLOW EVAL: DIAGNOSIS
Patient was provided with 4 oz of thin liquids (water impregnated with green food color dye). Oral Stage characterized by reduced labial seal closure to cup presentation resulting in mild anterior loss from the oral cavity with suspect reduced tongue coordination/ strength/ elevation for suspected piecemeal transfer/ deglutition (x2). Patient with adequate oral clearance. Pharyngeal stage characterized by initiation of the pharyngeal swallow and hyolaryngeal excursion upon digital palpation with no overt s/s penetration/ aspiration noted. Plastic Surgery placed gauze to incision site on left side of neck immediately following PO trials without any evidence of green food color dye on gauze. ENT JOSE Hayden then arrived to bedside ~5 minutes following and placed long swab deep into incision to further assess for leak x2 without evidence of green dye on swab to suggest leak. 1. Plastic Surgery removed packing/ guaze/ bandage from left side of patient's neck prior to PO trials 2. Patient was provided with 4 oz of thin liquids (water impregnated with green food color dye). Oral Stage characterized by reduced labial seal closure to cup presentation resulting in mild anterior loss from the oral cavity with suspect reduced tongue coordination/ strength/ elevation for suspected piecemeal transfer/ deglutition (x2). Patient with adequate oral clearance. Pharyngeal stage characterized by initiation of the pharyngeal swallow and hyolaryngeal excursion upon digital palpation with no overt s/s penetration/ aspiration noted. 3. Plastic Surgery placed gauze to incision site on left side of neck immediately following PO trials without any evidence of green food color dye on gauze. 4. ENT JOSE Hayden then arrived to bedside ~5 minutes following and placed long swab deep into incision to further assess for leak x2 without evidence of green dye return on swab to suggest leak. 1. Plastic Surgery removed packing/ guaze/ bandage from left side of patient's neck prior to PO trials. 2. Patient was provided with 4 oz of thin liquids (water impregnated with green food color dye). Oral Stage characterized by reduced labial seal closure to cup presentation resulting in mild anterior loss from the oral cavity with suspect reduced tongue coordination/ strength/ elevation for suspected piecemeal transfer/ deglutition (x2). Patient with adequate oral clearance. Pharyngeal stage characterized by initiation of the pharyngeal swallow and hyolaryngeal excursion upon digital palpation with no overt s/s penetration/ aspiration noted. 3. Plastic Surgery placed gauze to incision site on left side of neck immediately following PO trials without any evidence of green food color dye on gauze. 4. ENT JOSE Hayden then arrived to bedside ~5 minutes following and placed long swab deep into incision to further assess for leak x2 without evidence of green dye return on swab to suggest leak.

## 2024-11-09 LAB
ANION GAP SERPL CALC-SCNC: 15 MMOL/L — HIGH (ref 7–14)
BUN SERPL-MCNC: 3 MG/DL — LOW (ref 7–23)
CALCIUM SERPL-MCNC: 8.8 MG/DL — SIGNIFICANT CHANGE UP (ref 8.4–10.5)
CHLORIDE SERPL-SCNC: 104 MMOL/L — SIGNIFICANT CHANGE UP (ref 98–107)
CO2 SERPL-SCNC: 25 MMOL/L — SIGNIFICANT CHANGE UP (ref 22–31)
CREAT SERPL-MCNC: 0.77 MG/DL — SIGNIFICANT CHANGE UP (ref 0.5–1.3)
CULTURE RESULTS: ABNORMAL
EGFR: 93 ML/MIN/1.73M2 — SIGNIFICANT CHANGE UP
GLUCOSE SERPL-MCNC: 111 MG/DL — HIGH (ref 70–99)
HCT VFR BLD CALC: 35.3 % — LOW (ref 39–50)
HGB BLD-MCNC: 11.3 G/DL — LOW (ref 13–17)
MAGNESIUM SERPL-MCNC: 2.2 MG/DL — SIGNIFICANT CHANGE UP (ref 1.6–2.6)
MCHC RBC-ENTMCNC: 28.8 PG — SIGNIFICANT CHANGE UP (ref 27–34)
MCHC RBC-ENTMCNC: 32 G/DL — SIGNIFICANT CHANGE UP (ref 32–36)
MCV RBC AUTO: 89.8 FL — SIGNIFICANT CHANGE UP (ref 80–100)
NRBC # BLD: 0 /100 WBCS — SIGNIFICANT CHANGE UP (ref 0–0)
NRBC # FLD: 0 K/UL — SIGNIFICANT CHANGE UP (ref 0–0)
PHOSPHATE SERPL-MCNC: 2.7 MG/DL — SIGNIFICANT CHANGE UP (ref 2.5–4.5)
PLATELET # BLD AUTO: 303 K/UL — SIGNIFICANT CHANGE UP (ref 150–400)
POTASSIUM SERPL-MCNC: 3.9 MMOL/L — SIGNIFICANT CHANGE UP (ref 3.5–5.3)
POTASSIUM SERPL-SCNC: 3.9 MMOL/L — SIGNIFICANT CHANGE UP (ref 3.5–5.3)
RBC # BLD: 3.93 M/UL — LOW (ref 4.2–5.8)
RBC # FLD: 13.8 % — SIGNIFICANT CHANGE UP (ref 10.3–14.5)
SODIUM SERPL-SCNC: 144 MMOL/L — SIGNIFICANT CHANGE UP (ref 135–145)
SPECIMEN SOURCE: SIGNIFICANT CHANGE UP
WBC # BLD: 5.8 K/UL — SIGNIFICANT CHANGE UP (ref 3.8–10.5)
WBC # FLD AUTO: 5.8 K/UL — SIGNIFICANT CHANGE UP (ref 3.8–10.5)

## 2024-11-09 PROCEDURE — 99232 SBSQ HOSP IP/OBS MODERATE 35: CPT

## 2024-11-09 RX ORDER — LACTOBACILLUS ACIDOPHILUS 25MM CELL
1 CAPSULE ORAL
Refills: 0 | Status: DISCONTINUED | OUTPATIENT
Start: 2024-11-09 | End: 2024-11-11

## 2024-11-09 RX ORDER — DIBASIC SODIUM PHOSPHATE, MONOBASIC POTASSIUM PHOSPHATE AND MONOBASIC SODIUM PHOSPHATE 852; 155; 130 MG/1; MG/1; MG/1
1 TABLET ORAL ONCE
Refills: 0 | Status: COMPLETED | OUTPATIENT
Start: 2024-11-09 | End: 2024-11-09

## 2024-11-09 RX ADMIN — PIPERACILLIN AND TAZOBACTAM 25 GRAM(S): .5; 4 INJECTION, POWDER, LYOPHILIZED, FOR SOLUTION INTRAVENOUS at 13:57

## 2024-11-09 RX ADMIN — GABAPENTIN 300 MILLIGRAM(S): 300 CAPSULE ORAL at 21:15

## 2024-11-09 RX ADMIN — VANCOMYCIN HYDROCHLORIDE 125 MILLIGRAM(S): KIT at 17:53

## 2024-11-09 RX ADMIN — CHLORHEXIDINE GLUCONATE 15 MILLILITER(S): 40 SOLUTION TOPICAL at 05:43

## 2024-11-09 RX ADMIN — DIBASIC SODIUM PHOSPHATE, MONOBASIC POTASSIUM PHOSPHATE AND MONOBASIC SODIUM PHOSPHATE 1 PACKET(S): 852; 155; 130 TABLET ORAL at 13:56

## 2024-11-09 RX ADMIN — Medication 1 TABLET(S): at 17:53

## 2024-11-09 RX ADMIN — VANCOMYCIN HYDROCHLORIDE 125 MILLIGRAM(S): KIT at 11:48

## 2024-11-09 RX ADMIN — PIPERACILLIN AND TAZOBACTAM 25 GRAM(S): .5; 4 INJECTION, POWDER, LYOPHILIZED, FOR SOLUTION INTRAVENOUS at 05:42

## 2024-11-09 RX ADMIN — GABAPENTIN 300 MILLIGRAM(S): 300 CAPSULE ORAL at 05:41

## 2024-11-09 RX ADMIN — VANCOMYCIN HYDROCHLORIDE 125 MILLIGRAM(S): KIT at 05:42

## 2024-11-09 RX ADMIN — GABAPENTIN 300 MILLIGRAM(S): 300 CAPSULE ORAL at 13:56

## 2024-11-09 RX ADMIN — PIPERACILLIN AND TAZOBACTAM 25 GRAM(S): .5; 4 INJECTION, POWDER, LYOPHILIZED, FOR SOLUTION INTRAVENOUS at 21:14

## 2024-11-09 RX ADMIN — PETROLATUM 1 APPLICATION(S): 987.89 OINTMENT TOPICAL at 05:42

## 2024-11-09 RX ADMIN — CHLORHEXIDINE GLUCONATE 15 MILLILITER(S): 40 SOLUTION TOPICAL at 17:53

## 2024-11-09 RX ADMIN — PETROLATUM 1 APPLICATION(S): 987.89 OINTMENT TOPICAL at 17:53

## 2024-11-09 NOTE — PROGRESS NOTE ADULT - SUBJECTIVE AND OBJECTIVE BOX
Plastic Surgery Progress Note (pg [facility]: [id number], [facility]: [phone])    SUBJECTIVE  The patient was seen and examined. No acute events overnight. Pain controlled, afebrile w/ stable vitals. Dye test yesterday results were negative for leak.    OBJECTIVE  ___________________________________________________  VITAL SIGNS / I&O's   Vital Signs Last 24 Hrs  T(C): 36.6 (09 Nov 2024 05:41), Max: 36.6 (09 Nov 2024 05:41)  T(F): 97.8 (09 Nov 2024 05:41), Max: 97.8 (09 Nov 2024 05:41)  HR: 79 (09 Nov 2024 05:41) (65 - 79)  BP: 140/64 (09 Nov 2024 05:41) (131/- - 144/85)  BP(mean): 74 (08 Nov 2024 21:40) (74 - 74)  RR: 18 (09 Nov 2024 05:41) (17 - 18)  SpO2: 98% (09 Nov 2024 05:41) (97% - 99%)    Parameters below as of 09 Nov 2024 05:41  Patient On (Oxygen Delivery Method): room air          08 Nov 2024 07:01  -  09 Nov 2024 07:00  --------------------------------------------------------  IN:    dextrose 5% + sodium chloride 0.45%: 900 mL    IV PiggyBack: 300 mL    Oral Fluid: 325 mL  Total IN: 1525 mL    OUT:  Total OUT: 0 mL    Total NET: 1525 mL        ___________________________________________________  PHYSICAL EXAM    Gen: NAD, comfortable  HEENT: Improving diffuse lower face swelling L>R, nonerythematous. Intraoral flap intact with healthy appearing color and mucosalization. Suture line without visible openings. Aquacel packing clean, replaced.  Pulm: Breathing comfortably on RA  Ext: LUE distal skin graft dehiscence with area of nonviable tissue. No signs of infection, no expressible fluid. WTD dressing replaced  PSYCH: Affect normal, A&Ox3    ___________________________________________________  LABS                        11.3   5.80  )-----------( 303      ( 09 Nov 2024 06:09 )             35.3     09 Nov 2024 06:09    144    |  104    |  3      ----------------------------<  111    3.9     |  25     |  0.77     Ca    8.8        09 Nov 2024 06:09  Phos  2.7       09 Nov 2024 06:09  Mg     2.20      09 Nov 2024 06:09        CAPILLARY BLOOD GLUCOSE            Urinalysis Basic - ( 09 Nov 2024 06:09 )    Color: x / Appearance: x / SG: x / pH: x  Gluc: 111 mg/dL / Ketone: x  / Bili: x / Urobili: x   Blood: x / Protein: x / Nitrite: x   Leuk Esterase: x / RBC: x / WBC x   Sq Epi: x / Non Sq Epi: x / Bacteria: x      ___________________________________________________  MICRO  Recent Cultures:  Specimen Source: .Abscess, 11-04 @ 16:24; Results   Few Staphylococcus capitis "Susceptibilities not performed"[!]; Gram Stain:   Few polymorphonuclear leukocytes per low power field  No organisms seen per oil power field[!]; Organism: --  Specimen Source: .Abscess, 11-04 @ 12:54; Results   Few Pseudomonas aeruginosa  Few Prevotella buccae "Susceptibilities not performed"  Few Commensal helene consistent with body site[!]; Gram Stain:   Moderate polymorphonuclear leukocytes per low power field  Few Gram positive cocci in pairs per oil power field[!]; Organism: Pseudomonas aeruginosa[!]  Specimen Source: Drainage, 11-04 @ 10:18; Results   Rare Pseudomonas aeruginosa  Rare Prevotella buccae "Susceptibilities not performed"  Few Prevotella nanceiensis "Susceptibilities not performed"  Commensal helene consistent with body site[!]; Gram Stain: --; Organism: Pseudomonas aeruginosa[!]    ___________________________________________________  MEDICATIONS  (STANDING):  AQUAPHOR (petrolatum Ointment) 1 Application(s) Topical two times a day  chlorhexidine 0.12% Liquid 15 milliLiter(s) Oral Mucosa two times a day  gabapentin Solution 300 milliGRAM(s) Oral three times a day  lactobacillus acidophilus 1 Tablet(s) Oral two times a day with meals  piperacillin/tazobactam IVPB.. 3.375 Gram(s) IV Intermittent every 8 hours  vancomycin    Solution 125 milliGRAM(s) Oral every 6 hours    MEDICATIONS  (PRN):  acetaminophen   Oral Liquid .. 650 milliGRAM(s) Oral every 6 hours PRN Mild Pain (1 - 3)

## 2024-11-09 NOTE — PROGRESS NOTE ADULT - SUBJECTIVE AND OBJECTIVE BOX
Dr. Pascale Johnson  Pager [phone]    PROGRESS NOTE:     Patient is a 74y old  Male who presents with a chief complaint of     SUBJECTIVE / OVERNIGHT EVENTS: reports diarrhea improving, 3 soft BM overnight  ADDITIONAL REVIEW OF SYSTEMS: afebrile, no abd pain     MEDICATIONS  (STANDING):  AQUAPHOR (petrolatum Ointment) 1 Application(s) Topical two times a day  chlorhexidine 0.12% Liquid 15 milliLiter(s) Oral Mucosa two times a day  gabapentin Solution 300 milliGRAM(s) Oral three times a day  lactobacillus acidophilus 1 Tablet(s) Oral two times a day with meals  piperacillin/tazobactam IVPB.. 3.375 Gram(s) IV Intermittent every 8 hours  vancomycin    Solution 125 milliGRAM(s) Oral every 6 hours    MEDICATIONS  (PRN):  acetaminophen   Oral Liquid .. 650 milliGRAM(s) Oral every 6 hours PRN Mild Pain (1 - 3)      CAPILLARY BLOOD GLUCOSE        I&O's Summary    08 Nov 2024 07:01  -  09 Nov 2024 07:00  --------------------------------------------------------  IN: 1525 mL / OUT: 0 mL / NET: 1525 mL        PHYSICAL EXAM:  Vital Signs Last 24 Hrs  T(C): 36.6 (09 Nov 2024 05:41), Max: 36.6 (09 Nov 2024 05:41)  T(F): 97.8 (09 Nov 2024 05:41), Max: 97.8 (09 Nov 2024 05:41)  HR: 79 (09 Nov 2024 05:41) (65 - 79)  BP: 140/64 (09 Nov 2024 05:41) (131/- - 144/85)  BP(mean): 74 (08 Nov 2024 21:40) (74 - 74)  RR: 18 (09 Nov 2024 05:41) (17 - 18)  SpO2: 98% (09 Nov 2024 05:41) (97% - 99%)    Parameters below as of 09 Nov 2024 05:41  Patient On (Oxygen Delivery Method): room air      GENERAL: NAD, well-developed  HEAD:  Atraumatic, Normocephalic, L neck wound dressing   EYES: EOMI, conjunctiva and sclera clear  NECK: L neck swelling with dressing c/d/i  CHEST/LUNG: Clear to auscultation bilaterally; No wheeze  HEART: Regular rate and rhythm; No murmurs  ABDOMEN: Soft, Nontender, Nondistended; Bowel sounds present  EXTREMITIES:  2+ Peripheral Pulses, No edema, LUE in ACE wrap   PSYCH: AAOx3  NEUROLOGY: non-focal  SKIN: No rashes or lesions      LABS:                        11.3   5.80  )-----------( 303      ( 09 Nov 2024 06:09 )             35.3     11-09    144  |  104  |  3[L]  ----------------------------<  111[H]  3.9   |  25  |  0.77    Ca    8.8      09 Nov 2024 06:09  Phos  2.7     11-09  Mg     2.20     11-09  GENERAL: NAD, well-developed  HEAD:  Atraumatic, Normocephalic  EYES: EOMI, conjunctiva and sclera clear  NECK: L neck swelling with dressing c/d/i  CHEST/LUNG: Clear to auscultation bilaterally; No wheeze  HEART: Regular rate and rhythm; No murmurs  ABDOMEN: Soft, Nontender, Nondistended; Bowel sounds present  EXTREMITIES:  2+ Peripheral Pulses, No edema, LUE in ACE wrap   PSYCH: AAOx3  NEUROLOGY: non-focal  SKIN: No rashes or lesions          Urinalysis Basic - ( 09 Nov 2024 06:09 )    Color: x / Appearance: x / SG: x / pH: x  Gluc: 111 mg/dL / Ketone: x  / Bili: x / Urobili: x   Blood: x / Protein: x / Nitrite: x   Leuk Esterase: x / RBC: x / WBC x   Sq Epi: x / Non Sq Epi: x / Bacteria: x          RADIOLOGY & ADDITIONAL TESTS:  Results Reviewed:   Imaging Personally Reviewed:  Electrocardiogram Personally Reviewed:    COORDINATION OF CARE:  Care Discussed with Consultants/Other Providers [Y/N]:  Prior or Outpatient Records Reviewed [Y/N]:

## 2024-11-10 LAB
ANION GAP SERPL CALC-SCNC: 10 MMOL/L — SIGNIFICANT CHANGE UP (ref 7–14)
BUN SERPL-MCNC: 6 MG/DL — LOW (ref 7–23)
CALCIUM SERPL-MCNC: 8.6 MG/DL — SIGNIFICANT CHANGE UP (ref 8.4–10.5)
CHLORIDE SERPL-SCNC: 106 MMOL/L — SIGNIFICANT CHANGE UP (ref 98–107)
CO2 SERPL-SCNC: 25 MMOL/L — SIGNIFICANT CHANGE UP (ref 22–31)
CREAT SERPL-MCNC: 0.84 MG/DL — SIGNIFICANT CHANGE UP (ref 0.5–1.3)
EGFR: 91 ML/MIN/1.73M2 — SIGNIFICANT CHANGE UP
GLUCOSE SERPL-MCNC: 114 MG/DL — HIGH (ref 70–99)
HCT VFR BLD CALC: 33.7 % — LOW (ref 39–50)
HGB BLD-MCNC: 11 G/DL — LOW (ref 13–17)
MAGNESIUM SERPL-MCNC: 2.2 MG/DL — SIGNIFICANT CHANGE UP (ref 1.6–2.6)
MCHC RBC-ENTMCNC: 29.2 PG — SIGNIFICANT CHANGE UP (ref 27–34)
MCHC RBC-ENTMCNC: 32.6 G/DL — SIGNIFICANT CHANGE UP (ref 32–36)
MCV RBC AUTO: 89.4 FL — SIGNIFICANT CHANGE UP (ref 80–100)
NRBC # BLD: 0 /100 WBCS — SIGNIFICANT CHANGE UP (ref 0–0)
NRBC # FLD: 0 K/UL — SIGNIFICANT CHANGE UP (ref 0–0)
PHOSPHATE SERPL-MCNC: 2.7 MG/DL — SIGNIFICANT CHANGE UP (ref 2.5–4.5)
PLATELET # BLD AUTO: 289 K/UL — SIGNIFICANT CHANGE UP (ref 150–400)
POTASSIUM SERPL-MCNC: 3.5 MMOL/L — SIGNIFICANT CHANGE UP (ref 3.5–5.3)
POTASSIUM SERPL-SCNC: 3.5 MMOL/L — SIGNIFICANT CHANGE UP (ref 3.5–5.3)
RBC # BLD: 3.77 M/UL — LOW (ref 4.2–5.8)
RBC # FLD: 14 % — SIGNIFICANT CHANGE UP (ref 10.3–14.5)
SODIUM SERPL-SCNC: 141 MMOL/L — SIGNIFICANT CHANGE UP (ref 135–145)
WBC # BLD: 5.04 K/UL — SIGNIFICANT CHANGE UP (ref 3.8–10.5)
WBC # FLD AUTO: 5.04 K/UL — SIGNIFICANT CHANGE UP (ref 3.8–10.5)

## 2024-11-10 PROCEDURE — 99232 SBSQ HOSP IP/OBS MODERATE 35: CPT

## 2024-11-10 RX ORDER — ENOXAPARIN SODIUM 40MG/0.4ML
40 SYRINGE (ML) SUBCUTANEOUS EVERY 24 HOURS
Refills: 0 | Status: DISCONTINUED | OUTPATIENT
Start: 2024-11-10 | End: 2024-11-11

## 2024-11-10 RX ADMIN — Medication 650 MILLIGRAM(S): at 10:15

## 2024-11-10 RX ADMIN — PIPERACILLIN AND TAZOBACTAM 25 GRAM(S): .5; 4 INJECTION, POWDER, LYOPHILIZED, FOR SOLUTION INTRAVENOUS at 21:47

## 2024-11-10 RX ADMIN — VANCOMYCIN HYDROCHLORIDE 125 MILLIGRAM(S): KIT at 05:44

## 2024-11-10 RX ADMIN — Medication 1 TABLET(S): at 18:37

## 2024-11-10 RX ADMIN — Medication 650 MILLIGRAM(S): at 22:16

## 2024-11-10 RX ADMIN — Medication 650 MILLIGRAM(S): at 21:46

## 2024-11-10 RX ADMIN — VANCOMYCIN HYDROCHLORIDE 125 MILLIGRAM(S): KIT at 13:42

## 2024-11-10 RX ADMIN — Medication 1 TABLET(S): at 09:47

## 2024-11-10 RX ADMIN — CHLORHEXIDINE GLUCONATE 15 MILLILITER(S): 40 SOLUTION TOPICAL at 05:44

## 2024-11-10 RX ADMIN — PETROLATUM 1 APPLICATION(S): 987.89 OINTMENT TOPICAL at 18:33

## 2024-11-10 RX ADMIN — GABAPENTIN 300 MILLIGRAM(S): 300 CAPSULE ORAL at 21:47

## 2024-11-10 RX ADMIN — CHLORHEXIDINE GLUCONATE 15 MILLILITER(S): 40 SOLUTION TOPICAL at 18:32

## 2024-11-10 RX ADMIN — VANCOMYCIN HYDROCHLORIDE 125 MILLIGRAM(S): KIT at 00:05

## 2024-11-10 RX ADMIN — PIPERACILLIN AND TAZOBACTAM 25 GRAM(S): .5; 4 INJECTION, POWDER, LYOPHILIZED, FOR SOLUTION INTRAVENOUS at 05:44

## 2024-11-10 RX ADMIN — PIPERACILLIN AND TAZOBACTAM 25 GRAM(S): .5; 4 INJECTION, POWDER, LYOPHILIZED, FOR SOLUTION INTRAVENOUS at 13:44

## 2024-11-10 RX ADMIN — GABAPENTIN 300 MILLIGRAM(S): 300 CAPSULE ORAL at 13:43

## 2024-11-10 RX ADMIN — VANCOMYCIN HYDROCHLORIDE 125 MILLIGRAM(S): KIT at 23:19

## 2024-11-10 RX ADMIN — VANCOMYCIN HYDROCHLORIDE 125 MILLIGRAM(S): KIT at 18:32

## 2024-11-10 RX ADMIN — Medication 40 MILLIGRAM(S): at 18:32

## 2024-11-10 RX ADMIN — PETROLATUM 1 APPLICATION(S): 987.89 OINTMENT TOPICAL at 05:44

## 2024-11-10 RX ADMIN — GABAPENTIN 300 MILLIGRAM(S): 300 CAPSULE ORAL at 05:44

## 2024-11-10 RX ADMIN — Medication 650 MILLIGRAM(S): at 09:47

## 2024-11-10 NOTE — PROGRESS NOTE ADULT - SUBJECTIVE AND OBJECTIVE BOX
OTOLARYNGOLOGY (ENT) PROGRESS NOTE    11/5: Patient admitted from emergency room last night. I&D performed by PRS. AFVSS. Tolerating puree diet.   11/6: Patient seen and examined at bedside. AFVSS. Diet changed to NPO due to concern for connection between intraoral dehiscence and neck.   11/7: Patient seen and examined at bedside. AFVSS. Remains NPO. Reports decreased drainage in mouth  11/8: Patient seen and examined at bedside. AFVSS. 1 episode of diarrhea yesterday morning, none since. Refused vitals overnight.  11/9: Patient seen and examined at bedside. AFVSS. still has loose bm but not going often, tolerating puree  11/10: Patient seen and examined at bedside. AFVSS. Tolerating puree diet.     Vital Signs Last 24 Hrs  T(C): 36.3 (10 Nov 2024 05:44), Max: 36.4 (09 Nov 2024 14:00)  T(F): 97.4 (10 Nov 2024 05:44), Max: 97.6 (09 Nov 2024 21:25)  HR: 77 (10 Nov 2024 05:44) (72 - 77)  BP: 145/86 (10 Nov 2024 05:44) (123/67 - 145/86)  BP(mean): --  RR: 18 (10 Nov 2024 05:44) (17 - 18)  SpO2: 98% (10 Nov 2024 05:44) (96% - 100%)    Parameters below as of 10 Nov 2024 05:44  Patient On (Oxygen Delivery Method): room air          NAD, resting comfortably in bed   Neck flat and supple, no collection. Left lateral incision with aquacel packing in place  Flap with medial ecchymosis, resolved.  No concern for congestion. Small dehiscence anteriorly - appears closed  OC/OP: no erythema, bleeding, lacerations

## 2024-11-10 NOTE — DIETITIAN INITIAL EVALUATION ADULT - ADD RECOMMEND
1. Monitor PO intake/tolerance, labs, weights, BMs, and skin integrity  2. Please document % PO intake in nursing flowsheets to assess nutritional intake/monitor need for further intervention(s).   3. Please obtain updated wt/weekly wt  4. Provide feeding assistance prn   5. Encourage PO intake at meal times

## 2024-11-10 NOTE — DIETITIAN INITIAL EVALUATION ADULT - NSICDXPASTSURGICALHX_GEN_ALL_CORE_FT
72
PAST SURGICAL HISTORY:  H/O excision of mass mouth 2017    History of mandibular surgery     History of microdiscectomy lumbar 7/7/2015    S/P arthroscopy of left knee 2013    S/P vasectomy 1992

## 2024-11-10 NOTE — DIETITIAN INITIAL EVALUATION ADULT - PERSON TAUGHT/METHOD
Education provided on adequate oral intake.  Explored patient food preferences to implement as able during hospitalization./verbal instruction/individual instruction/patient instructed/spouse instructed

## 2024-11-10 NOTE — PROGRESS NOTE ADULT - SUBJECTIVE AND OBJECTIVE BOX
Plastic Surgery Progress Note (pg [facility]: [id number], [facility]: [phone])    SUBJECTIVE  The patient was seen and examined. No acute events overnight. Pain controlled, afebrile w/ stable vitals.     OBJECTIVE  ___________________________________________________  VITAL SIGNS / I&O's   Vital Signs Last 24 Hrs  T(C): 36.3 (10 Nov 2024 10:00), Max: 36.4 (09 Nov 2024 14:00)  T(F): 97.3 (10 Nov 2024 10:00), Max: 97.6 (09 Nov 2024 21:25)  HR: 73 (10 Nov 2024 10:00) (72 - 77)  BP: 111/59 (10 Nov 2024 10:00) (111/59 - 145/86)  BP(mean): --  RR: 17 (10 Nov 2024 10:00) (17 - 18)  SpO2: 98% (10 Nov 2024 10:00) (96% - 100%)    Parameters below as of 10 Nov 2024 10:00  Patient On (Oxygen Delivery Method): room air          09 Nov 2024 07:01  -  10 Nov 2024 07:00  --------------------------------------------------------  IN:    IV PiggyBack: 100 mL    Oral Fluid: 1080 mL  Total IN: 1180 mL    OUT:  Total OUT: 0 mL    Total NET: 1180 mL        ___________________________________________________  PHYSICAL EXAM    Gen: NAD, comfortable  HEENT: Improving diffuse lower face swelling L>R, nonerythematous. Intraoral flap intact with healthy appearing color and mucosalization. Suture line without visible openings. Aquacel packing clean, replaced.  Pulm: Breathing comfortably on RA  Ext: LUE distal skin graft dehiscence with area of nonviable tissue. No signs of infection, no expressible fluid. WTD dressing replaced  PSYCH: Affect normal, A&Ox3    ___________________________________________________  LABS                        11.0   5.04  )-----------( 289      ( 10 Nov 2024 06:20 )             33.7     10 Nov 2024 06:20    141    |  106    |  6      ----------------------------<  114    3.5     |  25     |  0.84     Ca    8.6        10 Nov 2024 06:20  Phos  2.7       10 Nov 2024 06:20  Mg     2.20      10 Nov 2024 06:20        CAPILLARY BLOOD GLUCOSE            Urinalysis Basic - ( 10 Nov 2024 06:20 )    Color: x / Appearance: x / SG: x / pH: x  Gluc: 114 mg/dL / Ketone: x  / Bili: x / Urobili: x   Blood: x / Protein: x / Nitrite: x   Leuk Esterase: x / RBC: x / WBC x   Sq Epi: x / Non Sq Epi: x / Bacteria: x      ___________________________________________________  MICRO  Recent Cultures:  Specimen Source: .Abscess, 11-04 @ 16:24; Results   Few Staphylococcus capitis "Susceptibilities not performed"[!]; Gram Stain:   Few polymorphonuclear leukocytes per low power field  No organisms seen per oil power field[!]; Organism: --  Specimen Source: .Abscess, 11-04 @ 12:54; Results   Few Pseudomonas aeruginosa  Few Prevotella buccae "Susceptibilities not performed"  Few Commensal helene consistent with body site[!]; Gram Stain:   Moderate polymorphonuclear leukocytes per low power field  Few Gram positive cocci in pairs per oil power field[!]; Organism: Pseudomonas aeruginosa[!]  Specimen Source: Drainage, 11-04 @ 10:18; Results   Rare Pseudomonas aeruginosa  Rare Prevotella buccae "Susceptibilities not performed"  Few Prevotella nanceiensis "Susceptibilities not performed"  Commensal helene consistent with body site[!]; Gram Stain: --; Organism: Pseudomonas aeruginosa[!]    ___________________________________________________  MEDICATIONS  (STANDING):  AQUAPHOR (petrolatum Ointment) 1 Application(s) Topical two times a day  chlorhexidine 0.12% Liquid 15 milliLiter(s) Oral Mucosa two times a day  enoxaparin Injectable 40 milliGRAM(s) SubCutaneous every 24 hours  gabapentin Solution 300 milliGRAM(s) Oral three times a day  lactobacillus acidophilus 1 Tablet(s) Oral two times a day with meals  piperacillin/tazobactam IVPB.. 3.375 Gram(s) IV Intermittent every 8 hours  vancomycin    Solution 125 milliGRAM(s) Oral every 6 hours    MEDICATIONS  (PRN):  acetaminophen   Oral Liquid .. 650 milliGRAM(s) Oral every 6 hours PRN Mild Pain (1 - 3)

## 2024-11-10 NOTE — PROGRESS NOTE ADULT - SUBJECTIVE AND OBJECTIVE BOX
Dr. Pascale Johnson  Pager [phone]    PROGRESS NOTE:     Patient is a 74y old  Male who presents with a chief complaint of wound infection (09 Nov 2024 12:52)      SUBJECTIVE / OVERNIGHT EVENTS: denies chest pain or sob   ADDITIONAL REVIEW OF SYSTEMS: afebrile , diarrhea improving, 3 overnight    MEDICATIONS  (STANDING):  AQUAPHOR (petrolatum Ointment) 1 Application(s) Topical two times a day  chlorhexidine 0.12% Liquid 15 milliLiter(s) Oral Mucosa two times a day  enoxaparin Injectable 40 milliGRAM(s) SubCutaneous every 24 hours  gabapentin Solution 300 milliGRAM(s) Oral three times a day  lactobacillus acidophilus 1 Tablet(s) Oral two times a day with meals  piperacillin/tazobactam IVPB.. 3.375 Gram(s) IV Intermittent every 8 hours  vancomycin    Solution 125 milliGRAM(s) Oral every 6 hours    MEDICATIONS  (PRN):  acetaminophen   Oral Liquid .. 650 milliGRAM(s) Oral every 6 hours PRN Mild Pain (1 - 3)      CAPILLARY BLOOD GLUCOSE        I&O's Summary    09 Nov 2024 07:01  -  10 Nov 2024 07:00  --------------------------------------------------------  IN: 1180 mL / OUT: 0 mL / NET: 1180 mL        PHYSICAL EXAM:  Vital Signs Last 24 Hrs  T(C): 36.3 (10 Nov 2024 10:00), Max: 36.4 (09 Nov 2024 14:00)  T(F): 97.3 (10 Nov 2024 10:00), Max: 97.6 (09 Nov 2024 21:25)  HR: 73 (10 Nov 2024 10:00) (72 - 77)  BP: 111/59 (10 Nov 2024 10:00) (111/59 - 145/86)  BP(mean): --  RR: 17 (10 Nov 2024 10:00) (17 - 18)  SpO2: 98% (10 Nov 2024 10:00) (96% - 100%)    Parameters below as of 10 Nov 2024 10:00  Patient On (Oxygen Delivery Method): room air      GENERAL: NAD, well-developed  HEAD:  Atraumatic, Normocephalic, L neck wound dressing   EYES: EOMI, conjunctiva and sclera clear  NECK: L neck swelling with dressing c/d/i  CHEST/LUNG: Clear to auscultation bilaterally; No wheeze  HEART: Regular rate and rhythm; No murmurs  ABDOMEN: Soft, Nontender, Nondistended; Bowel sounds present  EXTREMITIES:  2+ Peripheral Pulses, No edema, LUE in ACE wrap   PSYCH: AAOx3  NEUROLOGY: non-focal  SKIN: No rashes or lesions    LABS:                        11.0   5.04  )-----------( 289      ( 10 Nov 2024 06:20 )             33.7     11-10    141  |  106  |  6[L]  ----------------------------<  114[H]  3.5   |  25  |  0.84    Ca    8.6      10 Nov 2024 06:20  Phos  2.7     11-10  Mg     2.20     11-10            Urinalysis Basic - ( 10 Nov 2024 06:20 )    Color: x / Appearance: x / SG: x / pH: x  Gluc: 114 mg/dL / Ketone: x  / Bili: x / Urobili: x   Blood: x / Protein: x / Nitrite: x   Leuk Esterase: x / RBC: x / WBC x   Sq Epi: x / Non Sq Epi: x / Bacteria: x          RADIOLOGY & ADDITIONAL TESTS:  Results Reviewed:   Imaging Personally Reviewed:  Electrocardiogram Personally Reviewed:    COORDINATION OF CARE:  Care Discussed with Consultants/Other Providers [Y/N]:  Prior or Outpatient Records Reviewed [Y/N]:

## 2024-11-10 NOTE — DIETITIAN INITIAL EVALUATION ADULT - PERTINENT MEDS FT
MEDICATIONS  (STANDING):  AQUAPHOR (petrolatum Ointment) 1 Application(s) Topical two times a day  chlorhexidine 0.12% Liquid 15 milliLiter(s) Oral Mucosa two times a day  enoxaparin Injectable 40 milliGRAM(s) SubCutaneous every 24 hours  gabapentin Solution 300 milliGRAM(s) Oral three times a day  lactobacillus acidophilus 1 Tablet(s) Oral two times a day with meals  piperacillin/tazobactam IVPB.. 3.375 Gram(s) IV Intermittent every 8 hours  vancomycin    Solution 125 milliGRAM(s) Oral every 6 hours    MEDICATIONS  (PRN):  acetaminophen   Oral Liquid .. 650 milliGRAM(s) Oral every 6 hours PRN Mild Pain (1 - 3)

## 2024-11-10 NOTE — DIETITIAN INITIAL EVALUATION ADULT - OTHER INFO
74M PMH primary cancer of gingival mucosa, h/o neuropathy admitted for inflammatory disorder of jaw, found to be c.diff +.     Patient met chairside with spouse present. Patient is +c.diff, stated bowel movements are getting "more regular". Endorsed loose but not watery stool. Denied nausea and vomitting. Patient stated he has good appetite and intake. He is eating % of all meals. Patient voiced preferences for more pudding and yogurt (writer relayed to ). Patient denied food allergies or dietary restrictions. Patient dislikes fish and wishes to continue no fish on diet order. Patient trialed Ensure OTC when at home. Wife reported patient does not tolerate as it gave him loose stools. Patient and spouse do not wish to trial oral nutrition supplement at this time given concern for loose stools. Request honored.     Wt trend: Wife noted patient is weight stable. She endorsed weight trend appears with weight loss but she believes it is due to scale error. Noted then patient was home, patient was 188 lb on 10/24 and 187 lb on 11/1. Per HIE, patient appears to be 169 lb.

## 2024-11-10 NOTE — DIETITIAN INITIAL EVALUATION ADULT - PERTINENT LABORATORY DATA
11-10    141  |  106  |  6[L]  ----------------------------<  114[H]  3.5   |  25  |  0.84    Ca    8.6      10 Nov 2024 06:20  Phos  2.7     11-10  Mg     2.20     11-10    A1C with Estimated Average Glucose Result: 5.5 % (10-25-24 @ 00:20)

## 2024-11-11 ENCOUNTER — TRANSCRIPTION ENCOUNTER (OUTPATIENT)
Age: 74
End: 2024-11-11

## 2024-11-11 VITALS
TEMPERATURE: 98 F | HEART RATE: 71 BPM | SYSTOLIC BLOOD PRESSURE: 129 MMHG | DIASTOLIC BLOOD PRESSURE: 73 MMHG | RESPIRATION RATE: 17 BRPM | OXYGEN SATURATION: 98 %

## 2024-11-11 LAB
ANION GAP SERPL CALC-SCNC: 13 MMOL/L — SIGNIFICANT CHANGE UP (ref 7–14)
BUN SERPL-MCNC: 7 MG/DL — SIGNIFICANT CHANGE UP (ref 7–23)
CALCIUM SERPL-MCNC: 8.4 MG/DL — SIGNIFICANT CHANGE UP (ref 8.4–10.5)
CHLORIDE SERPL-SCNC: 104 MMOL/L — SIGNIFICANT CHANGE UP (ref 98–107)
CO2 SERPL-SCNC: 25 MMOL/L — SIGNIFICANT CHANGE UP (ref 22–31)
CREAT SERPL-MCNC: 0.85 MG/DL — SIGNIFICANT CHANGE UP (ref 0.5–1.3)
EGFR: 91 ML/MIN/1.73M2 — SIGNIFICANT CHANGE UP
GLUCOSE SERPL-MCNC: 113 MG/DL — HIGH (ref 70–99)
HCT VFR BLD CALC: 33.9 % — LOW (ref 39–50)
HGB BLD-MCNC: 10.8 G/DL — LOW (ref 13–17)
MAGNESIUM SERPL-MCNC: 2.3 MG/DL — SIGNIFICANT CHANGE UP (ref 1.6–2.6)
MCHC RBC-ENTMCNC: 29 PG — SIGNIFICANT CHANGE UP (ref 27–34)
MCHC RBC-ENTMCNC: 31.9 G/DL — LOW (ref 32–36)
MCV RBC AUTO: 90.9 FL — SIGNIFICANT CHANGE UP (ref 80–100)
NRBC # BLD: 0 /100 WBCS — SIGNIFICANT CHANGE UP (ref 0–0)
NRBC # FLD: 0 K/UL — SIGNIFICANT CHANGE UP (ref 0–0)
PHOSPHATE SERPL-MCNC: 2.5 MG/DL — SIGNIFICANT CHANGE UP (ref 2.5–4.5)
PLATELET # BLD AUTO: 277 K/UL — SIGNIFICANT CHANGE UP (ref 150–400)
POTASSIUM SERPL-MCNC: 3.6 MMOL/L — SIGNIFICANT CHANGE UP (ref 3.5–5.3)
POTASSIUM SERPL-SCNC: 3.6 MMOL/L — SIGNIFICANT CHANGE UP (ref 3.5–5.3)
RBC # BLD: 3.73 M/UL — LOW (ref 4.2–5.8)
RBC # FLD: 14.1 % — SIGNIFICANT CHANGE UP (ref 10.3–14.5)
SODIUM SERPL-SCNC: 142 MMOL/L — SIGNIFICANT CHANGE UP (ref 135–145)
WBC # BLD: 4.01 K/UL — SIGNIFICANT CHANGE UP (ref 3.8–10.5)
WBC # FLD AUTO: 4.01 K/UL — SIGNIFICANT CHANGE UP (ref 3.8–10.5)

## 2024-11-11 PROCEDURE — 99232 SBSQ HOSP IP/OBS MODERATE 35: CPT

## 2024-11-11 RX ORDER — OXYCODONE HYDROCHLORIDE 30 MG/1
1 TABLET ORAL
Qty: 16 | Refills: 0
Start: 2024-11-11 | End: 2024-11-14

## 2024-11-11 RX ORDER — VANCOMYCIN HYDROCHLORIDE 50 MG/ML
1 KIT ORAL
Qty: 21 | Refills: 0
Start: 2024-11-11 | End: 2024-11-24

## 2024-11-11 RX ORDER — GABAPENTIN 300 MG/1
1 CAPSULE ORAL
Qty: 0 | Refills: 0 | DISCHARGE
Start: 2024-11-11

## 2024-11-11 RX ADMIN — Medication 1 TABLET(S): at 08:56

## 2024-11-11 RX ADMIN — VANCOMYCIN HYDROCHLORIDE 125 MILLIGRAM(S): KIT at 06:02

## 2024-11-11 RX ADMIN — PIPERACILLIN AND TAZOBACTAM 25 GRAM(S): .5; 4 INJECTION, POWDER, LYOPHILIZED, FOR SOLUTION INTRAVENOUS at 13:15

## 2024-11-11 RX ADMIN — GABAPENTIN 300 MILLIGRAM(S): 300 CAPSULE ORAL at 13:15

## 2024-11-11 RX ADMIN — PETROLATUM 1 APPLICATION(S): 987.89 OINTMENT TOPICAL at 06:02

## 2024-11-11 RX ADMIN — VANCOMYCIN HYDROCHLORIDE 125 MILLIGRAM(S): KIT at 11:07

## 2024-11-11 RX ADMIN — GABAPENTIN 300 MILLIGRAM(S): 300 CAPSULE ORAL at 06:03

## 2024-11-11 RX ADMIN — CHLORHEXIDINE GLUCONATE 15 MILLILITER(S): 40 SOLUTION TOPICAL at 06:02

## 2024-11-11 RX ADMIN — PIPERACILLIN AND TAZOBACTAM 25 GRAM(S): .5; 4 INJECTION, POWDER, LYOPHILIZED, FOR SOLUTION INTRAVENOUS at 06:03

## 2024-11-11 NOTE — DISCHARGE NOTE PROVIDER - CARE PROVIDER_API CALL
Cliff Perez  Head/Neck Surgery  [address] [place] [address]  Phone: [phone]  Fax: [phone]  Follow Up Time:

## 2024-11-11 NOTE — PROGRESS NOTE ADULT - PROBLEM SELECTOR PLAN 2
- management as per ENT and plastics   - follow up cultures  - continue with antibiotics (Zosyn) as per primary team.

## 2024-11-11 NOTE — DISCHARGE NOTE NURSING/CASE MANAGEMENT/SOCIAL WORK - FINANCIAL ASSISTANCE
[facility] provides services at a reduced cost to those who are determined to be eligible through [facility]’s financial assistance program. Information regarding [facility]’s financial assistance program can be found by going to [email or web address] or by calling [phone].

## 2024-11-11 NOTE — PROGRESS NOTE ADULT - SUBJECTIVE AND OBJECTIVE BOX
OTOLARYNGOLOGY (ENT) PROGRESS NOTE    11/5: Patient admitted from emergency room last night. I&D performed by PRS. AFVSS. Tolerating puree diet.   11/6: Patient seen and examined at bedside. AFVSS. Diet changed to NPO due to concern for connection between intraoral dehiscence and neck.   11/7: Patient seen and examined at bedside. AFVSS. Remains NPO. Reports decreased drainage in mouth  11/8: Patient seen and examined at bedside. AFVSS. 1 episode of diarrhea yesterday morning, none since. Refused vitals overnight.  11/9: Patient seen and examined at bedside. AFVSS. still has loose bm but not going often, tolerating puree  11/10: Patient seen and examined at bedside. AFVSS. Tolerating puree diet.   11/11: Patient seen and examined at bedside. AFVSS. Tolerating puree diet.     Vital Signs Last 24 Hrs  T(C): 36.2 (11 Nov 2024 02:21), Max: 36.4 (10 Nov 2024 14:30)  T(F): 97.2 (11 Nov 2024 02:21), Max: 97.6 (10 Nov 2024 14:30)  HR: 61 (11 Nov 2024 02:21) (61 - 75)  BP: 126/62 (11 Nov 2024 02:21) (111/59 - 126/70)  BP(mean): --  RR: 18 (11 Nov 2024 02:21) (17 - 18)  SpO2: 96% (11 Nov 2024 02:21) (96% - 100%)    Parameters below as of 11 Nov 2024 02:21  Patient On (Oxygen Delivery Method): room air              NAD, resting comfortably in bed   Neck flat and supple, no collection. Left lateral incision with aquacel packing in place  Flap with medial ecchymosis, resolved.  No concern for congestion. Small dehiscence anteriorly - appears closed  OC/OP: no erythema, bleeding, lacerations

## 2024-11-11 NOTE — DISCHARGE NOTE PROVIDER - HOSPITAL COURSE
Patient re admitted for intraoral and neck abscess. S/P I and D of neck abscess at bedside. Patient was kept NPO to allow intraoral healing and closure of dehiscence. Neck abscess was packed with Aquicel.... Patient re admitted for intraoral and neck abscess. S/P I and D of neck abscess at bedside. Patient was kept NPO to allow intraoral healing and closure of dehiscence. Neck abscess was packed with Aquicel. Patient was treated with IV antibiotics and vancomycin for C-Diff, Cleared for discharge home with VNS on 11/11/24.

## 2024-11-11 NOTE — DISCHARGE NOTE NURSING/CASE MANAGEMENT/SOCIAL WORK - PATIENT PORTAL LINK FT
You can access the FollowMyHealth Patient Portal offered by [facility] by registering at the following website: [email or web address]. By joining [facility]’s FollowMyHealth portal, you will also be able to view your health information using other applications (apps) compatible with our system.

## 2024-11-11 NOTE — PROGRESS NOTE ADULT - PROVIDER SPECIALTY LIST ADULT
Dr Green and Dr Grande both declined to see this patient.    Will route message to Dr Turner's nurse pool to advise if he will see.    External referral placed in Dr Turner's slot up front by PSR's.  
Dr Waldorn agreed to see     Placed paper referral back in the referral inbox   
ENT
External paper referral received for:  · Forgetfulness  · Generalized headaches  · Imbalance  · Dizziness      Placed on Dr. Green's desk in [place].    Dr. Green, please review. Ok to see?  
OK  
Paper referral placed on Dr Waldron's desk     Please see below and advise  Thank you   
Per Dr. Turner's VO: Pain everywhere. Based on the report this is beyond the scope of my practice.    Will Dr. Waldron see?    Paper external referral put up front in Dr. Waldron's slot.  
Plastic Surgery
Referred for headaches, referral in mailbox for review.  
ENT
Plastic Surgery
ENT
Plastic Surgery
Hospitalist

## 2024-11-11 NOTE — DISCHARGE NOTE PROVIDER - NSDCFUSCHEDAPPT_GEN_ALL_CORE_FT
Cliff Perez  [facility]  [facility]  Scheduled Appointment: 11/13/2024

## 2024-11-11 NOTE — DISCHARGE NOTE PROVIDER - NSDCFUADDINST_GEN_ALL_CORE_FT
Left neck packing: Pack with surgicel daily.  Wound care instructions:  Please follow the following wound care instructions until your follow up appointment with Dr. Andrea.    For the neck, please continue daily packing changes with a strip of Aquacel Ag. You may cover the packing with gauze and tape.    For the arm, please apply saline-soaked gauze to the wrist daily. Apply Aquaphor ointment to the skin graft at the wrist. You may wrap your wrist with fredy/kerlix or an ace bandage depending on your preference.    For the skin graft donor site at the left thigh, please apply Aquaphor ointment twice daily.

## 2024-11-11 NOTE — PROGRESS NOTE ADULT - ASSESSMENT
73 years old male with a PMHx of peripheral neuropathy, microdiscectomy, eczema and SCC of left gingivolabial sulcus s/p L SND 1-3, excision of gingival lesion and marginal mandibulectomy, L RFFF w/ STSG from L thigh on 10/24. Patient was discharged on 11/1/2024, presented with dehiscence at left arm flap harvest site and with dehiscence in left floor of mouth w/ collection in L neck now s/p I&D w/ aquacel packing placed by PRS 11/4.     - f/u culture  - continue unasyn  - continue vancomycin PO for c diff  - packing changes by PRS  - continue home medications  - NPO overnight - discuss continue NPO 1 more day vs resume diet today          
AUSTYN CHANDNIBRITTON is a 73yMale s/p left FOM resection for SCC of left gingivolabial sulcus with left radial forearm free flap and STSG from left thigh 10/24, discharged 11/01, presented to ED 11/04 with SS drainage from intraoral incision and LUE wound dehiscence.      - Dye test today to assess for leaks  - Aquacel packing at neck  - local wound care for LUE: wet to dry dressing changes BID to distal dehiscence of STSG on wrist  - Aquaphor to STSG donor site L thigh      Plastic Surgery   [facility]: [id number]  [facility]: [phone]
AUSTYN ROSSLEOBARDOBRITTON is a 73yMale s/p left FOM resection for SCC of left gingivolabial sulcus with left radial forearm free flap and STSG from left thigh 10/24, discharged 11/01, presented to ED 11/04 with SS drainage from intraoral incision and LUE wound dehiscence.      - Aquacel packing at neck  - local wound care for LUE: wet to dry dressing changes BID to distal dehiscence of STSG on wrist  - Aquaphor to STSG donor site L thigh  - Abscess cx gram positive cocci in pairs, fu sensitivities    Plastic Surgery   [facility]: [id number]  [facility]: [phone]  
AUSTYN ROYER is a 73yMale s/p left FOM resection for SCC of left gingivolabial sulcus with left radial forearm free flap and STSG from left thigh 10/24, discharged 11/01, presented to ED 11/04 with SS drainage from intraoral incision and LUE wound dehiscence.      - Dye test negative 11/8  - Aquacel packing at neck  - local wound care for LUE: wet to dry dressing changes BID to distal dehiscence of STSG on wrist  - Aquaphor to STSG donor site L thigh      Plastic Surgery   [facility]: [id number]  [facility]: [phone]
AUSTYN ROYER is a 73yMale s/p left FOM resection for SCC of left gingivolabial sulcus with left radial forearm free flap and STSG from left thigh 10/24, discharged 11/01, presented to ED 11/04 with SS drainage from intraoral incision and LUE wound dehiscence.     - Dye test negative 11/8  - Aquacel packing at neck qd  - local wound care for LUE: wet to dry dressing changes BID to distal dehiscence of STSG on wrist  - Aquaphor to STSG donor site L thigh  - Ok for dc from PRS perspective      Plastic Surgery   [facility]: [id number]  [facility]: [phone]
73 years old male with a PMHx of peripheral neuropathy, microdiscectomy, eczema and SCC of left gingivolabial sulcus s/p L SND 1-3, excision of gingival lesion and marginal mandibulectomy, L RFFF w/ STSG from L thigh on 10/24. Patient was discharged on 11/1/2024, presented with dehiscence at left arm flap harvest site and with dehiscence in left floor of mouth w/ collection in L neck now s/p I&D w/ aquacel packing placed by PRS 11/4.     - f/u culture - growing pseudomonas  - continue zosyn  - completes vancomycin PO for c diff today  - packing changes by PRS  - continue home medications  - NPO today - tentative plan for green dye test 11/8        
73 years old male with a PMHx of peripheral neuropathy, microdiscectomy, eczema and SCC of left gingivolabial sulcus s/p L SND 1-3, excision of gingival lesion and marginal mandibulectomy, L RFFF w/ STSG from L thigh on 10/24. Patient was discharged on 11/1/2024, presented with dehiscence at left arm flap harvest site and with dehiscence in left floor of mouth w/ collection in L neck now s/p I&D w/ aquacel packing placed by PRS 11/4. Patient passed methylene blue dye test and advanced to puree.     - f/u culture - growing pseudomonas  - continue zosyn  - Continue vancomycin PO for c diff  - packing changes by PRS  - continue home medications        
73 years old male with a PMHx of peripheral neuropathy, microdiscectomy, eczema and SCC of left gingivolabial sulcus s/p L SND 1-3, excision of gingival lesion and marginal mandibulectomy, L RFFF w/ STSG from L thigh on 10/24. Patient was discharged on 11/1/2024, presented with dehiscence at left arm flap harvest site and with dehiscence in left floor of mouth w/ collection in L neck now s/p I&D w/ aquacel packing placed by PRS 11/4. Patient passed methylene blue dye test and advanced to puree.     - f/u culture - growing pseudomonas  - continue zosyn  - Continue vancomycin PO for c diff  - packing changes by PRS  - continue home medications  - discharge planning - likely early this week with home VNS        
73M with peripheral neuropathy, microdiscectomy, eczema and SCC of left gingivolabial sulcus s/p L SND 1-3, excision of gingival lesion and marginal mandibulectomy, L RFFF w/ STSG from L thigh on 10/24. Patient developed C diff diarrhea and was started on po vanco on 10/29.  Patient was discharged on 11/1/2024 and [facility] with increased discharge from his mouth. PRS noticed some dehiscence from the left arm flap, started wet to dry. CT scan performed showing collection at surgical site now s/p I&D by PRS team.  Pt continues to have diarrhea of the same consistency since it started - watery with small 'chunks' of fecal matter.  pt does not feel there has been any improvement/worsening of the diarrhea since it started despite being compliant with po vanco.  
ASSESSMENT/PLAN:   AUSTYN LINTON is a 73yMale s/p left FOM resection for SCC of left gingivolabial sulcus with left radial forearm free flap and STSG from left thigh 10/24, discharged 11/01, presented to ED 11/04 with SS drainage from intraoral incision and LUE wound dehiscence.      - Aquacel to intraoral incision  - local wound care for LUE: wet to dry dressing changes BID to distal dehiscence of STSG on wrist  - Aquaphor to STSG donor site L thigh  - Fu wound cx    Plastic Surgery   [facility]: [id number]  [facility]: [phone]
AUSTYN ROYER is a 73yMale s/p left FOM resection for SCC of left gingivolabial sulcus with left radial forearm free flap and STSG from left thigh 10/24, discharged 11/01, presented to ED 11/04 with SS drainage from intraoral incision and LUE wound dehiscence.      - Dye test negative 11/8  - Aquacel packing at neck  - local wound care for LUE: wet to dry dressing changes BID to distal dehiscence of STSG on wrist  - Aquaphor to STSG donor site L thigh      Plastic Surgery   [facility]: [id number]  [facility]: [phone]
73 years old male with a PMHx of peripheral neuropathy, microdiscectomy, eczema and SCC of left gingivolabial sulcus s/p L SND 1-3, excision of gingival lesion and marginal mandibulectomy, L RFFF w/ STSG from L thigh on 10/24. Patient was discharged on 11/1/2024, presented with dehiscence at left arm flap harvest site and with dehiscence in left floor of mouth w/ collection in L neck now s/p I&D w/ aquacel packing placed by PRS 11/4.     - f/u culture  - continue unasyn  - continue vancomycin PO for c diff  - packing changes by PRS  - continue home medications  - continue puree diet            
73 years old male with a PMHx of peripheral neuropathy, microdiscectomy, eczema and SCC of left gingivolabial sulcus s/p L SND 1-3, excision of gingival lesion and marginal mandibulectomy, L RFFF w/ STSG from L thigh on 10/24. Patient was discharged on 11/1/2024, presented with dehiscence at left arm flap harvest site and with dehiscence in left floor of mouth w/ collection in L neck now s/p I&D w/ aquacel packing placed by PRS 11/4.     - f/u culture - growing pseudomonas  - continue zosyn  - s/p vancomycin PO for c diff  - packing changes by PRS  - continue home medications  - Plan for methylene blue dye test today      
73 years old male with a PMHx of peripheral neuropathy, microdiscectomy, eczema and SCC of left gingivolabial sulcus s/p L SND 1-3, excision of gingival lesion and marginal mandibulectomy, L RFFF w/ STSG from L thigh on 10/24. Patient was discharged on 11/1/2024, presented with dehiscence at left arm flap harvest site and with dehiscence in left floor of mouth w/ collection in L neck now s/p I&D w/ aquacel packing placed by PRS 11/4. Patient passed methylene blue dye test and advanced to puree.     - f/u culture - growing pseudomonas  - continue zosyn  - Last day of PO vanc for c diff today  - packing changes by PRS  - continue home medications  - discharge planning - likely early this week with home VNS        
73M with peripheral neuropathy, microdiscectomy, eczema and SCC of left gingivolabial sulcus s/p L SND 1-3, excision of gingival lesion and marginal mandibulectomy, L RFFF w/ STSG from L thigh on 10/24. Patient developed C diff diarrhea and was started on po vanco on 10/29.  Patient was discharged on 11/1/2024 and came back to [facility] on 11/4 with increased discharge from his mouth. Reported left neck is street than yesterday. PRS saw and noticed some dehiscence from the left arm flap, started wet to dry. CT scan performed showing collection at surgical site now s/p I&D by PRS team.  Pt continues to have diarrhea of the same consistency since it started - watery with small 'chunks' of fecal matter.  pt does not feel there has been any improvement/worsening of the diarrhea since it started despite being compliant with po vanco.  
AUSTYN ROSSLEOBARDOBRITTON is a 73yMale s/p left FOM resection for SCC of left gingivolabial sulcus with left radial forearm free flap and STSG from left thigh 10/24, discharged 11/01, presented to ED 11/04 with SS drainage from intraoral incision and LUE wound dehiscence.      - Aquacel packing at neck  - local wound care for LUE: wet to dry dressing changes BID to distal dehiscence of STSG on wrist  - Aquaphor to STSG donor site L thigh  - Abscess cx gram positive cocci in pairs, fu sensitivities  - completing po vanc for c diff today  - NPO until leak test tomorrow    Plastic Surgery   [facility]: [id number]  [facility]: [phone]
73M with peripheral neuropathy, microdiscectomy, eczema and SCC of left gingivolabial sulcus s/p L SND 1-3, excision of gingival lesion and marginal mandibulectomy, L RFFF w/ STSG from L thigh on 10/24. Patient developed C diff diarrhea and was started on po vanco on 10/29.  Patient was discharged on 11/1/2024 and came back to [facility] on 11/4 with increased discharge from his mouth. Reported left neck is street than yesterday. PRS saw and noticed some dehiscence from the left arm flap, started wet to dry. CT scan performed showing collection at surgical site now s/p I&D by PRS team.  Pt continues to have diarrhea of the same consistency since it started - watery with small 'chunks' of fecal matter.  pt does not feel there has been any improvement/worsening of the diarrhea since it started despite being compliant with po vanco.

## 2024-11-11 NOTE — PROGRESS NOTE ADULT - PROBLEM SELECTOR PROBLEM 3
Primary cancer of gingival mucosa

## 2024-11-11 NOTE — PROGRESS NOTE ADULT - PROBLEM SELECTOR PLAN 1
pt on po vanco since 10/29 - on vanco 125 q6, continue for 14 day course, then taper 125 mg bid for 7 days, then 125 qd x7 days, last C diff positive on 10/29  - monitor stool outpt   - would not send another C diff sample at this time as results can remain positive for long time  - diarrhea may be exacerbated by current antibiotics regimen as well.  - c/w probiotic lactobacillus
pt on po vanco since 10/29 - on vanco 125 q6, continue for 14 day course, then taper 125 mg bid for 7 days, then 125 qd x7 days, last C diff positive on 10/29  - monitor stool outpt   - would not send another C diff sample at this time as results can remain positive for long time  - diarrhea may be exacerbated by current antibiotics regimen as well.  - c/w probiotic lactobacillus
pt on po vanco since 10/29 - would continue for 14 day course (finish 11/11); C diff pos on 10/29  - monitor stool outpt   - would not send another C diff sample at this time as results can remain positive for long time  - diarrhea may be exacerbated by current antibiotics regimen as well.  - add probiotic lactobacillus

## 2024-11-11 NOTE — PROGRESS NOTE ADULT - SUBJECTIVE AND OBJECTIVE BOX
[facility] Division of [facility]  Tone Matamoros MD  Pager [phone]      Patient is a 74y old  Male who presents with a chief complaint of Neck abscess (11 Nov 2024 10:01)      SUBJECTIVE / OVERNIGHT EVENTS:    no acute event o/n.  had 4 semi-formed stools in 24 hr. no abd pain     ADDITIONAL REVIEW OF SYSTEMS:    RESPIRATORY: No cough, wheezing, chills or hemoptysis; No shortness of breath  CARDIOVASCULAR: No chest pain, palpitations, dizziness, or leg swelling  GASTROINTESTINAL: No abdominal or epigastric pain. No nausea, vomiting, or hematemesis; + diarrhea. No melena or hematochezia.      MEDICATIONS  (STANDING):  AQUAPHOR (petrolatum Ointment) 1 Application(s) Topical two times a day  chlorhexidine 0.12% Liquid 15 milliLiter(s) Oral Mucosa two times a day  enoxaparin Injectable 40 milliGRAM(s) SubCutaneous every 24 hours  gabapentin Solution 300 milliGRAM(s) Oral three times a day  lactobacillus acidophilus 1 Tablet(s) Oral two times a day with meals  piperacillin/tazobactam IVPB.. 3.375 Gram(s) IV Intermittent every 8 hours  vancomycin    Solution 125 milliGRAM(s) Oral every 6 hours    MEDICATIONS  (PRN):  acetaminophen   Oral Liquid .. 650 milliGRAM(s) Oral every 6 hours PRN Mild Pain (1 - 3)      CAPILLARY BLOOD GLUCOSE        I&O's Summary    10 Nov 2024 07:01  -  11 Nov 2024 07:00  --------------------------------------------------------  IN: 1300 mL / OUT: 0 mL / NET: 1300 mL    11 Nov 2024 07:01  -  11 Nov 2024 13:00  --------------------------------------------------------  IN: 240 mL / OUT: 0 mL / NET: 240 mL        PHYSICAL EXAM:  Vital Signs Last 24 Hrs  T(C): 36.2 (11 Nov 2024 10:29), Max: 36.4 (10 Nov 2024 14:30)  T(F): 97.1 (11 Nov 2024 10:29), Max: 97.6 (10 Nov 2024 14:30)  HR: 68 (11 Nov 2024 10:29) (61 - 75)  BP: 114/61 (11 Nov 2024 10:29) (114/61 - 126/70)  BP(mean): --  RR: 18 (11 Nov 2024 10:29) (17 - 18)  SpO2: 99% (11 Nov 2024 10:29) (96% - 100%)    Parameters below as of 11 Nov 2024 06:08  Patient On (Oxygen Delivery Method): room air        CONSTITUTIONAL: NAD,  EYES: PERRLA; conjunctiva and sclera clear  ENMT: Moist oral mucosa, no pharyngeal injection or exudates;   NECK: Supple, no palpable masses;  RESPIRATORY: Normal respiratory effort; lungs are clear to auscultation bilaterally  CARDIOVASCULAR: Regular rate and rhythm, normal S1 and S2, no murmur/rub/gallop; No lower extremity edema; Peripheral pulses are 2+ bilaterally  ABDOMEN: Nontender to palpation, normoactive bowel sounds, no rebound/guarding;   MUSCLOSKELETAL:   no clubbing or cyanosis of digits; no joint swelling or tenderness to palpation  PSYCH: A+O to person, place, and time; affect appropriate  NEUROLOGY: CN 2-12 are intact and symmetric; no gross sensory deficits;   SKIN: No rashes;     LABS:                        10.8   4.01  )-----------( 277      ( 11 Nov 2024 06:32 )             33.9     11-11    142  |  104  |  7   ----------------------------<  113[H]  3.6   |  25  |  0.85    Ca    8.4      11 Nov 2024 06:32  Phos  2.5     11-11  Mg     2.30     11-11            Urinalysis Basic - ( 11 Nov 2024 06:32 )    Color: x / Appearance: x / SG: x / pH: x  Gluc: 113 mg/dL / Ketone: x  / Bili: x / Urobili: x   Blood: x / Protein: x / Nitrite: x   Leuk Esterase: x / RBC: x / WBC x   Sq Epi: x / Non Sq Epi: x / Bacteria: x          RADIOLOGY & ADDITIONAL TESTS:  Results Reviewed:   Imaging Personally Reviewed:  Electrocardiogram Personally Reviewed:    COORDINATION OF CARE:  Care Discussed with Consultants/Other Providers [Y/N]:  Prior or Outpatient Records Reviewed [Y/N]:

## 2024-11-11 NOTE — DISCHARGE NOTE PROVIDER - NSDCMRMEDTOKEN_GEN_ALL_CORE_FT
acetaminophen 160 mg/5 mL oral suspension: 20.31 milliliter(s) orally every 6 hours as needed for Temp greater or equal to 38C (100.4F), Mild Pain (1 - 3)  Caltrate  + vitamin D3: 1 tab orally once a day  chlorhexidine 0.12% mucous membrane liquid: 15 milliliter(s) mucous membrane 2 times a day  Chondroitin-Glucosamine oral tablet: 1 tab(s) orally once a day (at bedtime)  9/30/2024  gabapentin 300 mg oral capsule: 1 cap(s) orally 3 times a day  L-Arginine 1000 mg oral tablet: 1 tab(s) orally once a day (at bedtime)  9/30/2024  labetalol 200 mg oral tablet: 1 tab(s) orally every 8 hours  Multiple Vitamins oral tablet: 1 tab(s) orally once a day  9/30/2024  oxyCODONE 5 mg oral tablet: 1 tab(s) orally every 6 hours MDD: 4  pantoprazole 40 mg oral delayed release tablet: 1 tab(s) orally once a day (before a meal)  triamcinolone 0.1% topical cream: Apply topically to affected area as needed  vancomycin 125 mg oral capsule: 1 cap(s) orally every 6 hours  Vitamin B-12 1000 mcg oral tablet: 1 tab(s) orally once a day   acetaminophen 160 mg/5 mL oral suspension: 20.31 milliliter(s) orally every 6 hours as needed for Temp greater or equal to 38C (100.4F), Mild Pain (1 - 3)  Caltrate  + vitamin D3: 1 tab orally once a day  Chondroitin-Glucosamine oral tablet: 1 tab(s) orally once a day (at bedtime)  9/30/2024  L-Arginine 1000 mg oral tablet: 1 tab(s) orally once a day (at bedtime)  9/30/2024  labetalol 200 mg oral tablet: 1 tab(s) orally every 8 hours  Multiple Vitamins oral tablet: 1 tab(s) orally once a day  9/30/2024  Neurontin 300 mg oral capsule: 1 tab(s) orally 3 times a day  oxyCODONE 5 mg oral tablet: 1 tab(s) orally every 6 hours MDD: 4  pantoprazole 40 mg oral delayed release tablet: 1 tab(s) orally once a day (before a meal)  triamcinolone 0.1% topical cream: Apply topically to affected area as needed  vancomycin 125 mg oral capsule: 1 cap(s) orally 2 times a day  Vitamin B-12 1000 mcg oral tablet: 1 tab(s) orally once a day

## 2024-11-11 NOTE — PROGRESS NOTE ADULT - SUBJECTIVE AND OBJECTIVE BOX
Plastic Surgery Progress Note (pg [facility]: [id number], [facility]: [phone])    SUBJECTIVE  The patient was seen and examined. No acute events overnight. Pain controlled, afebrile w/ stable vitals.     OBJECTIVE  ___________________________________________________  VITAL SIGNS / I&O's   Vital Signs Last 24 Hrs  T(C): 36.2 (11 Nov 2024 06:08), Max: 36.4 (10 Nov 2024 14:30)  T(F): 97.1 (11 Nov 2024 06:08), Max: 97.6 (10 Nov 2024 14:30)  HR: 65 (11 Nov 2024 06:08) (61 - 75)  BP: 125/79 (11 Nov 2024 06:08) (111/59 - 126/70)  BP(mean): --  RR: 18 (11 Nov 2024 06:08) (17 - 18)  SpO2: 97% (11 Nov 2024 06:08) (96% - 100%)    Parameters below as of 11 Nov 2024 06:08  Patient On (Oxygen Delivery Method): room air          10 Nov 2024 07:01  -  11 Nov 2024 07:00  --------------------------------------------------------  IN:    IV PiggyBack: 200 mL    Oral Fluid: 1100 mL  Total IN: 1300 mL    OUT:  Total OUT: 0 mL    Total NET: 1300 mL        ___________________________________________________  PHYSICAL EXAM    Gen: NAD, comfortable  HEENT: Mild lower face swelling L>R, nonerythematous. Intraoral flap intact with healthy appearing color and mucosalization. Aquacel packing clean, replaced.  Pulm: Breathing comfortably on RA  Ext: LUE distal skin graft dehiscence healing appropriately. No signs of infection, no expressible fluid. WTD dressing replaced. LLE STSG donor site healing well with minimal scabbing  PSYCH: Affect normal, A&Ox3    ___________________________________________________  LABS                        10.8   4.01  )-----------( 277      ( 11 Nov 2024 06:32 )             33.9     10 Nov 2024 06:20    141    |  106    |  6      ----------------------------<  114    3.5     |  25     |  0.84     Ca    8.6        10 Nov 2024 06:20  Phos  2.7       10 Nov 2024 06:20  Mg     2.20      10 Nov 2024 06:20        CAPILLARY BLOOD GLUCOSE            Urinalysis Basic - ( 10 Nov 2024 06:20 )    Color: x / Appearance: x / SG: x / pH: x  Gluc: 114 mg/dL / Ketone: x  / Bili: x / Urobili: x   Blood: x / Protein: x / Nitrite: x   Leuk Esterase: x / RBC: x / WBC x   Sq Epi: x / Non Sq Epi: x / Bacteria: x      ___________________________________________________  MICRO  Recent Cultures:  Specimen Source: .Abscess, 11-04 @ 16:24; Results   Few Staphylococcus capitis "Susceptibilities not performed"[!]; Gram Stain:   Few polymorphonuclear leukocytes per low power field  No organisms seen per oil power field[!]; Organism: --  Specimen Source: .Abscess, 11-04 @ 12:54; Results   Few Pseudomonas aeruginosa  Few Prevotella buccae "Susceptibilities not performed"  Few Commensal helene consistent with body site[!]; Gram Stain:   Moderate polymorphonuclear leukocytes per low power field  Few Gram positive cocci in pairs per oil power field[!]; Organism: Pseudomonas aeruginosa[!]  Specimen Source: Drainage, 11-04 @ 10:18; Results   Rare Pseudomonas aeruginosa  Rare Prevotella buccae "Susceptibilities not performed"  Few Prevotella nanceiensis "Susceptibilities not performed"  Commensal helene consistent with body site[!]; Gram Stain: --; Organism: Pseudomonas aeruginosa[!]    ___________________________________________________  MEDICATIONS  (STANDING):  AQUAPHOR (petrolatum Ointment) 1 Application(s) Topical two times a day  chlorhexidine 0.12% Liquid 15 milliLiter(s) Oral Mucosa two times a day  enoxaparin Injectable 40 milliGRAM(s) SubCutaneous every 24 hours  gabapentin Solution 300 milliGRAM(s) Oral three times a day  lactobacillus acidophilus 1 Tablet(s) Oral two times a day with meals  piperacillin/tazobactam IVPB.. 3.375 Gram(s) IV Intermittent every 8 hours  vancomycin    Solution 125 milliGRAM(s) Oral every 6 hours    MEDICATIONS  (PRN):  acetaminophen   Oral Liquid .. 650 milliGRAM(s) Oral every 6 hours PRN Mild Pain (1 - 3)

## 2024-11-12 ENCOUNTER — OUTPATIENT (OUTPATIENT)
Dept: OUTPATIENT SERVICES | Facility: HOSPITAL | Age: 74
LOS: 1 days | Discharge: HOME | End: 2024-11-12
Payer: MEDICARE

## 2024-11-12 ENCOUNTER — NON-APPOINTMENT (OUTPATIENT)
Age: 74
End: 2024-11-12

## 2024-11-12 DIAGNOSIS — Z98.890 OTHER SPECIFIED POSTPROCEDURAL STATES: Chronic | ICD-10-CM

## 2024-11-12 DIAGNOSIS — Z98.89 OTHER SPECIFIED POSTPROCEDURAL STATES: Chronic | ICD-10-CM

## 2024-11-12 DIAGNOSIS — Z98.52 VASECTOMY STATUS: Chronic | ICD-10-CM

## 2024-11-13 ENCOUNTER — APPOINTMENT (OUTPATIENT)
Dept: PLASTIC SURGERY | Facility: CLINIC | Age: 74
End: 2024-11-13
Payer: MEDICARE

## 2024-11-13 VITALS
SYSTOLIC BLOOD PRESSURE: 145 MMHG | BODY MASS INDEX: 28.95 KG/M2 | HEIGHT: 68 IN | HEART RATE: 91 BPM | DIASTOLIC BLOOD PRESSURE: 76 MMHG | WEIGHT: 191 LBS | OXYGEN SATURATION: 97 % | TEMPERATURE: 98.3 F

## 2024-11-13 PROCEDURE — 99024 POSTOP FOLLOW-UP VISIT: CPT

## 2024-11-18 ENCOUNTER — APPOINTMENT (OUTPATIENT)
Dept: RADIATION ONCOLOGY | Facility: CLINIC | Age: 74
End: 2024-11-18
Payer: MEDICARE

## 2024-11-18 VITALS
HEIGHT: 68 IN | HEART RATE: 93 BPM | DIASTOLIC BLOOD PRESSURE: 78 MMHG | TEMPERATURE: 97.16 F | SYSTOLIC BLOOD PRESSURE: 138 MMHG | OXYGEN SATURATION: 99 % | RESPIRATION RATE: 16 BRPM

## 2024-11-18 PROCEDURE — 99205 OFFICE O/P NEW HI 60 MIN: CPT

## 2024-11-19 ENCOUNTER — APPOINTMENT (OUTPATIENT)
Dept: PLASTIC SURGERY | Facility: CLINIC | Age: 74
End: 2024-11-19
Payer: MEDICARE

## 2024-11-19 VITALS
WEIGHT: 191 LBS | SYSTOLIC BLOOD PRESSURE: 138 MMHG | HEART RATE: 92 BPM | OXYGEN SATURATION: 97 % | HEIGHT: 68 IN | BODY MASS INDEX: 28.95 KG/M2 | DIASTOLIC BLOOD PRESSURE: 76 MMHG | TEMPERATURE: 208.4 F

## 2024-11-19 PROCEDURE — 99024 POSTOP FOLLOW-UP VISIT: CPT

## 2024-11-20 ENCOUNTER — APPOINTMENT (OUTPATIENT)
Dept: OTOLARYNGOLOGY | Facility: CLINIC | Age: 74
End: 2024-11-20
Payer: MEDICARE

## 2024-11-20 VITALS
WEIGHT: 178 LBS | HEIGHT: 68 IN | DIASTOLIC BLOOD PRESSURE: 84 MMHG | BODY MASS INDEX: 26.98 KG/M2 | HEART RATE: 93 BPM | SYSTOLIC BLOOD PRESSURE: 143 MMHG

## 2024-11-20 DIAGNOSIS — C03.9 MALIGNANT NEOPLASM OF GUM, UNSPECIFIED: ICD-10-CM

## 2024-11-20 PROCEDURE — 99024 POSTOP FOLLOW-UP VISIT: CPT

## 2024-11-20 PROCEDURE — 92610 EVALUATE SWALLOWING FUNCTION: CPT | Mod: GN,59

## 2024-11-20 PROCEDURE — 92610 EVALUATE SWALLOWING FUNCTION: CPT | Mod: GN

## 2024-11-26 ENCOUNTER — APPOINTMENT (OUTPATIENT)
Dept: PLASTIC SURGERY | Facility: CLINIC | Age: 74
End: 2024-11-26
Payer: MEDICARE

## 2024-11-26 ENCOUNTER — NON-APPOINTMENT (OUTPATIENT)
Age: 74
End: 2024-11-26

## 2024-11-26 ENCOUNTER — APPOINTMENT (OUTPATIENT)
Age: 74
End: 2024-11-26
Payer: MEDICARE

## 2024-11-26 VITALS
SYSTOLIC BLOOD PRESSURE: 147 MMHG | OXYGEN SATURATION: 97 % | DIASTOLIC BLOOD PRESSURE: 95 MMHG | TEMPERATURE: 97.6 F | HEART RATE: 76 BPM | WEIGHT: 178 LBS | BODY MASS INDEX: 26.98 KG/M2 | RESPIRATION RATE: 16 BRPM | HEIGHT: 68 IN

## 2024-11-26 DIAGNOSIS — C06.9 MALIGNANT NEOPLASM OF MOUTH, UNSPECIFIED: ICD-10-CM

## 2024-11-26 PROCEDURE — 99024 POSTOP FOLLOW-UP VISIT: CPT

## 2024-11-27 ENCOUNTER — NON-APPOINTMENT (OUTPATIENT)
Age: 74
End: 2024-11-27

## 2024-11-27 PROCEDURE — 77263 THER RADIOLOGY TX PLNG CPLX: CPT

## 2024-11-27 PROCEDURE — 77334 RADIATION TREATMENT AID(S): CPT | Mod: 26

## 2024-12-02 DIAGNOSIS — C06.9 MALIGNANT NEOPLASM OF MOUTH, UNSPECIFIED: ICD-10-CM

## 2024-12-05 ENCOUNTER — NON-APPOINTMENT (OUTPATIENT)
Age: 74
End: 2024-12-05

## 2024-12-10 ENCOUNTER — APPOINTMENT (OUTPATIENT)
Dept: OTOLARYNGOLOGY | Facility: CLINIC | Age: 74
End: 2024-12-10
Payer: MEDICARE

## 2024-12-10 ENCOUNTER — APPOINTMENT (OUTPATIENT)
Dept: PLASTIC SURGERY | Facility: CLINIC | Age: 74
End: 2024-12-10
Payer: MEDICARE

## 2024-12-10 VITALS
HEIGHT: 68 IN | WEIGHT: 182 LBS | OXYGEN SATURATION: 97 % | BODY MASS INDEX: 27.58 KG/M2 | TEMPERATURE: 98.2 F | DIASTOLIC BLOOD PRESSURE: 77 MMHG | SYSTOLIC BLOOD PRESSURE: 150 MMHG | HEART RATE: 83 BPM

## 2024-12-10 PROCEDURE — 92526 ORAL FUNCTION THERAPY: CPT | Mod: GN

## 2024-12-10 PROCEDURE — 99024 POSTOP FOLLOW-UP VISIT: CPT

## 2024-12-11 PROCEDURE — 77301 RADIOTHERAPY DOSE PLAN IMRT: CPT | Mod: 26

## 2024-12-11 PROCEDURE — 77338 DESIGN MLC DEVICE FOR IMRT: CPT | Mod: 26

## 2024-12-11 PROCEDURE — 77300 RADIATION THERAPY DOSE PLAN: CPT | Mod: 26

## 2024-12-13 NOTE — PATIENT PROFILE ADULT. - NUTRITION PROFILE
It was a pleasure to see you today!    Please schedule your bone density scan as soon as you can at [facility] where you had it done before. For [facility] in [place] please call central scheduling at [phone] to schedule all appointments. Please ask for the location address at the time of the phone call.      Please schedule a follow-up in 6 month/s before you leave. Please get labs in 1-2 weeks prior to next appointment.     Thank you and have a great rest of your day!    Dr. Villalobos    
no indicators present

## 2024-12-18 ENCOUNTER — NON-APPOINTMENT (OUTPATIENT)
Age: 74
End: 2024-12-18

## 2024-12-18 ENCOUNTER — APPOINTMENT (OUTPATIENT)
Dept: OTOLARYNGOLOGY | Facility: CLINIC | Age: 74
End: 2024-12-18
Payer: MEDICARE

## 2024-12-18 VITALS
BODY MASS INDEX: 27.62 KG/M2 | SYSTOLIC BLOOD PRESSURE: 160 MMHG | HEIGHT: 68 IN | DIASTOLIC BLOOD PRESSURE: 93 MMHG | HEART RATE: 81 BPM | OXYGEN SATURATION: 98 % | WEIGHT: 182.25 LBS

## 2024-12-18 DIAGNOSIS — Z98.890 OTHER SPECIFIED POSTPROCEDURAL STATES: ICD-10-CM

## 2024-12-18 DIAGNOSIS — Z87.39 OTHER SPECIFIED POSTPROCEDURAL STATES: ICD-10-CM

## 2024-12-18 DIAGNOSIS — C06.9 MALIGNANT NEOPLASM OF MOUTH, UNSPECIFIED: ICD-10-CM

## 2024-12-18 PROCEDURE — 99024 POSTOP FOLLOW-UP VISIT: CPT

## 2024-12-18 PROCEDURE — 77387B: CUSTOM | Mod: 26

## 2024-12-18 PROCEDURE — 77427 RADIATION TX MANAGEMENT X5: CPT

## 2024-12-19 ENCOUNTER — NON-APPOINTMENT (OUTPATIENT)
Age: 74
End: 2024-12-19

## 2024-12-19 PROCEDURE — 77427 RADIATION TX MANAGEMENT X5: CPT

## 2024-12-19 PROCEDURE — 77387B: CUSTOM | Mod: 26

## 2024-12-20 PROCEDURE — 77387B: CUSTOM | Mod: 26

## 2024-12-23 ENCOUNTER — NON-APPOINTMENT (OUTPATIENT)
Age: 74
End: 2024-12-23

## 2024-12-23 PROCEDURE — 77387B: CUSTOM | Mod: 26

## 2024-12-24 ENCOUNTER — APPOINTMENT (OUTPATIENT)
Age: 74
End: 2024-12-24

## 2024-12-24 ENCOUNTER — NON-APPOINTMENT (OUTPATIENT)
Age: 74
End: 2024-12-24

## 2024-12-24 VITALS
HEIGHT: 68 IN | OXYGEN SATURATION: 98 % | RESPIRATION RATE: 16 BRPM | SYSTOLIC BLOOD PRESSURE: 127 MMHG | DIASTOLIC BLOOD PRESSURE: 70 MMHG | TEMPERATURE: 96.98 F | BODY MASS INDEX: 27.89 KG/M2 | WEIGHT: 184 LBS | HEART RATE: 78 BPM

## 2024-12-24 PROCEDURE — 99024 POSTOP FOLLOW-UP VISIT: CPT

## 2024-12-24 PROCEDURE — 77014: CPT | Mod: 26

## 2024-12-26 ENCOUNTER — NON-APPOINTMENT (OUTPATIENT)
Age: 74
End: 2024-12-26

## 2024-12-26 PROCEDURE — 77387B: CUSTOM | Mod: 26

## 2024-12-26 PROCEDURE — 77427 RADIATION TX MANAGEMENT X5: CPT

## 2024-12-27 PROCEDURE — 77387B: CUSTOM | Mod: 26

## 2024-12-30 PROCEDURE — 77387B: CUSTOM | Mod: 26

## 2024-12-31 ENCOUNTER — NON-APPOINTMENT (OUTPATIENT)
Age: 74
End: 2024-12-31

## 2024-12-31 PROCEDURE — 77387B: CUSTOM | Mod: 26

## 2025-01-03 PROCEDURE — 77014: CPT | Mod: 26

## 2025-01-04 PROCEDURE — 77427 RADIATION TX MANAGEMENT X5: CPT

## 2025-01-04 PROCEDURE — 77387B: CUSTOM | Mod: 26

## 2025-01-06 PROCEDURE — 77387B: CUSTOM | Mod: 26

## 2025-01-07 ENCOUNTER — NON-APPOINTMENT (OUTPATIENT)
Age: 75
End: 2025-01-07

## 2025-01-07 PROCEDURE — 77387B: CUSTOM | Mod: 26

## 2025-01-08 ENCOUNTER — NON-APPOINTMENT (OUTPATIENT)
Age: 75
End: 2025-01-08

## 2025-01-08 PROCEDURE — 77387B: CUSTOM | Mod: 26

## 2025-01-10 PROCEDURE — 77014: CPT | Mod: 26

## 2025-01-11 PROCEDURE — 77387B: CUSTOM | Mod: 26

## 2025-01-11 PROCEDURE — 77427 RADIATION TX MANAGEMENT X5: CPT

## 2025-01-13 ENCOUNTER — APPOINTMENT (OUTPATIENT)
Dept: OTOLARYNGOLOGY | Facility: CLINIC | Age: 75
End: 2025-01-13
Payer: MEDICARE

## 2025-01-13 PROCEDURE — 92526 ORAL FUNCTION THERAPY: CPT | Mod: GN

## 2025-01-13 PROCEDURE — 77387B: CUSTOM | Mod: 26

## 2025-01-14 ENCOUNTER — APPOINTMENT (OUTPATIENT)
Dept: PLASTIC SURGERY | Facility: CLINIC | Age: 75
End: 2025-01-14
Payer: MEDICARE

## 2025-01-14 VITALS
DIASTOLIC BLOOD PRESSURE: 80 MMHG | BODY MASS INDEX: 27.89 KG/M2 | HEIGHT: 68 IN | WEIGHT: 184 LBS | SYSTOLIC BLOOD PRESSURE: 145 MMHG | TEMPERATURE: 208.94 F | OXYGEN SATURATION: 97 % | HEART RATE: 74 BPM

## 2025-01-14 DIAGNOSIS — C06.9 MALIGNANT NEOPLASM OF MOUTH, UNSPECIFIED: ICD-10-CM

## 2025-01-14 PROCEDURE — 99024 POSTOP FOLLOW-UP VISIT: CPT

## 2025-01-14 PROCEDURE — 77387B: CUSTOM | Mod: 26

## 2025-01-15 ENCOUNTER — APPOINTMENT (OUTPATIENT)
Dept: OTOLARYNGOLOGY | Facility: CLINIC | Age: 75
End: 2025-01-15
Payer: MEDICARE

## 2025-01-15 ENCOUNTER — NON-APPOINTMENT (OUTPATIENT)
Age: 75
End: 2025-01-15

## 2025-01-15 VITALS
SYSTOLIC BLOOD PRESSURE: 137 MMHG | BODY MASS INDEX: 26.98 KG/M2 | OXYGEN SATURATION: 97 % | HEIGHT: 68 IN | HEART RATE: 70 BPM | WEIGHT: 178 LBS | DIASTOLIC BLOOD PRESSURE: 81 MMHG

## 2025-01-15 DIAGNOSIS — C03.9 MALIGNANT NEOPLASM OF GUM, UNSPECIFIED: ICD-10-CM

## 2025-01-15 PROCEDURE — 99214 OFFICE O/P EST MOD 30 MIN: CPT | Mod: 24

## 2025-01-16 ENCOUNTER — NON-APPOINTMENT (OUTPATIENT)
Age: 75
End: 2025-01-16

## 2025-01-16 PROCEDURE — 77387B: CUSTOM | Mod: 26

## 2025-01-16 RX ORDER — DIPHENHYDRAMINE HYDROCHLORIDE AND LIDOCAINE HYDROCHLORIDE AND ALUMINUM HYDROXIDE AND MAGNESIUM HYDRO
KIT
Qty: 900 | Refills: 3 | Status: ACTIVE | COMMUNITY
Start: 2025-01-16 | End: 1900-01-01

## 2025-01-17 PROCEDURE — 77014: CPT | Mod: 26

## 2025-01-18 PROCEDURE — 77427 RADIATION TX MANAGEMENT X5: CPT

## 2025-01-18 PROCEDURE — 77387B: CUSTOM | Mod: 26

## 2025-01-21 PROCEDURE — 77387B: CUSTOM | Mod: 26

## 2025-01-22 ENCOUNTER — NON-APPOINTMENT (OUTPATIENT)
Age: 75
End: 2025-01-22

## 2025-01-22 VITALS
WEIGHT: 179 LBS | RESPIRATION RATE: 16 BRPM | HEIGHT: 68 IN | BODY MASS INDEX: 27.13 KG/M2 | HEART RATE: 60 BPM | DIASTOLIC BLOOD PRESSURE: 85 MMHG | SYSTOLIC BLOOD PRESSURE: 140 MMHG | OXYGEN SATURATION: 98 %

## 2025-01-22 PROCEDURE — 77387B: CUSTOM | Mod: 26

## 2025-01-23 ENCOUNTER — NON-APPOINTMENT (OUTPATIENT)
Age: 75
End: 2025-01-23

## 2025-01-23 PROCEDURE — 77387B: CUSTOM | Mod: 26

## 2025-01-24 PROCEDURE — 77014: CPT | Mod: 26

## 2025-01-27 ENCOUNTER — APPOINTMENT (OUTPATIENT)
Dept: OTOLARYNGOLOGY | Facility: CLINIC | Age: 75
End: 2025-01-27
Payer: MEDICARE

## 2025-01-27 PROCEDURE — 77427 RADIATION TX MANAGEMENT X5: CPT

## 2025-01-27 PROCEDURE — 77387B: CUSTOM | Mod: 26

## 2025-01-27 PROCEDURE — 92526 ORAL FUNCTION THERAPY: CPT | Mod: GN

## 2025-01-28 PROCEDURE — 77387B: CUSTOM | Mod: 26

## 2025-01-29 ENCOUNTER — NON-APPOINTMENT (OUTPATIENT)
Age: 75
End: 2025-01-29

## 2025-01-29 VITALS
HEART RATE: 75 BPM | RESPIRATION RATE: 16 BRPM | WEIGHT: 180 LBS | BODY MASS INDEX: 27.28 KG/M2 | DIASTOLIC BLOOD PRESSURE: 88 MMHG | SYSTOLIC BLOOD PRESSURE: 147 MMHG | HEIGHT: 68 IN | TEMPERATURE: 96.98 F | OXYGEN SATURATION: 97 %

## 2025-01-29 PROCEDURE — 77387B: CUSTOM | Mod: 26

## 2025-01-30 PROCEDURE — 77387B: CUSTOM | Mod: 26

## 2025-01-31 PROCEDURE — 77387B: CUSTOM | Mod: 26

## 2025-02-03 PROCEDURE — 77387B: CUSTOM | Mod: 26

## 2025-02-03 PROCEDURE — 77427 RADIATION TX MANAGEMENT X5: CPT

## 2025-02-04 PROCEDURE — 77387B: CUSTOM | Mod: 26

## 2025-02-05 ENCOUNTER — NON-APPOINTMENT (OUTPATIENT)
Age: 75
End: 2025-02-05

## 2025-02-05 PROCEDURE — 77387B: CUSTOM | Mod: 26

## 2025-02-10 ENCOUNTER — APPOINTMENT (OUTPATIENT)
Dept: OTOLARYNGOLOGY | Facility: CLINIC | Age: 75
End: 2025-02-10
Payer: MEDICARE

## 2025-02-10 PROCEDURE — 92526 ORAL FUNCTION THERAPY: CPT | Mod: GN

## 2025-02-12 ENCOUNTER — APPOINTMENT (OUTPATIENT)
Age: 75
End: 2025-02-12
Payer: MEDICARE

## 2025-02-12 PROCEDURE — 99213 OFFICE O/P EST LOW 20 MIN: CPT

## 2025-02-18 ENCOUNTER — APPOINTMENT (OUTPATIENT)
Dept: PLASTIC SURGERY | Facility: CLINIC | Age: 75
End: 2025-02-18
Payer: MEDICARE

## 2025-02-18 ENCOUNTER — NON-APPOINTMENT (OUTPATIENT)
Age: 75
End: 2025-02-18

## 2025-02-18 VITALS
HEART RATE: 80 BPM | OXYGEN SATURATION: 96 % | SYSTOLIC BLOOD PRESSURE: 137 MMHG | TEMPERATURE: 208.58 F | BODY MASS INDEX: 26.37 KG/M2 | HEIGHT: 68 IN | WEIGHT: 174 LBS | DIASTOLIC BLOOD PRESSURE: 75 MMHG

## 2025-02-18 DIAGNOSIS — C06.9 MALIGNANT NEOPLASM OF MOUTH, UNSPECIFIED: ICD-10-CM

## 2025-02-18 DIAGNOSIS — C03.9 MALIGNANT NEOPLASM OF GUM, UNSPECIFIED: ICD-10-CM

## 2025-02-18 PROCEDURE — 99213 OFFICE O/P EST LOW 20 MIN: CPT

## 2025-03-05 ENCOUNTER — APPOINTMENT (OUTPATIENT)
Dept: OTOLARYNGOLOGY | Facility: CLINIC | Age: 75
End: 2025-03-05
Payer: MEDICARE

## 2025-03-05 VITALS
OXYGEN SATURATION: 98 % | HEART RATE: 64 BPM | HEIGHT: 68 IN | DIASTOLIC BLOOD PRESSURE: 73 MMHG | SYSTOLIC BLOOD PRESSURE: 119 MMHG | WEIGHT: 175 LBS | BODY MASS INDEX: 26.52 KG/M2

## 2025-03-05 DIAGNOSIS — C03.9 MALIGNANT NEOPLASM OF GUM, UNSPECIFIED: ICD-10-CM

## 2025-03-05 PROCEDURE — 99212 OFFICE O/P EST SF 10 MIN: CPT

## 2025-03-05 PROCEDURE — 92526 ORAL FUNCTION THERAPY: CPT | Mod: GN

## 2025-03-19 ENCOUNTER — APPOINTMENT (OUTPATIENT)
Dept: RADIATION ONCOLOGY | Facility: CLINIC | Age: 75
End: 2025-03-19
Payer: MEDICARE

## 2025-03-19 VITALS
TEMPERATURE: 98.2 F | WEIGHT: 181.99 LBS | HEART RATE: 57 BPM | OXYGEN SATURATION: 98 % | SYSTOLIC BLOOD PRESSURE: 121 MMHG | DIASTOLIC BLOOD PRESSURE: 68 MMHG | BODY MASS INDEX: 27.58 KG/M2 | RESPIRATION RATE: 18 BRPM | HEIGHT: 68 IN

## 2025-03-19 PROCEDURE — 99024 POSTOP FOLLOW-UP VISIT: CPT

## 2025-03-19 RX ORDER — TIMOLOL MALEATE 5 MG/ML
0.5 SOLUTION OPHTHALMIC
Refills: 0 | Status: ACTIVE | COMMUNITY
Start: 2025-03-19

## 2025-03-25 ENCOUNTER — APPOINTMENT (OUTPATIENT)
Age: 75
End: 2025-03-25

## 2025-03-26 ENCOUNTER — APPOINTMENT (OUTPATIENT)
Age: 75
End: 2025-03-26

## 2025-03-26 PROCEDURE — 99213 OFFICE O/P EST LOW 20 MIN: CPT

## 2025-03-31 NOTE — REASON FOR VISIT
[Subsequent Evaluation] : a subsequent evaluation for [FreeTextEntry2] : SCCa mandibular gingivolabial sulcus DM (diabetes mellitus)

## 2025-04-01 ENCOUNTER — APPOINTMENT (OUTPATIENT)
Age: 75
End: 2025-04-01

## 2025-04-09 ENCOUNTER — APPOINTMENT (OUTPATIENT)
Dept: OTOLARYNGOLOGY | Facility: CLINIC | Age: 75
End: 2025-04-09

## 2025-04-09 PROCEDURE — 92526 ORAL FUNCTION THERAPY: CPT | Mod: GN

## 2025-04-22 ENCOUNTER — APPOINTMENT (OUTPATIENT)
Dept: PLASTIC SURGERY | Facility: CLINIC | Age: 75
End: 2025-04-22
Payer: MEDICARE

## 2025-04-22 VITALS
WEIGHT: 174.4 LBS | TEMPERATURE: 98.3 F | HEIGHT: 68 IN | HEART RATE: 67 BPM | SYSTOLIC BLOOD PRESSURE: 133 MMHG | OXYGEN SATURATION: 97 % | DIASTOLIC BLOOD PRESSURE: 83 MMHG | BODY MASS INDEX: 26.43 KG/M2

## 2025-04-22 DIAGNOSIS — C06.9 MALIGNANT NEOPLASM OF MOUTH, UNSPECIFIED: ICD-10-CM

## 2025-04-22 PROCEDURE — 99213 OFFICE O/P EST LOW 20 MIN: CPT

## 2025-04-23 ENCOUNTER — APPOINTMENT (OUTPATIENT)
Dept: OTOLARYNGOLOGY | Facility: CLINIC | Age: 75
End: 2025-04-23

## 2025-04-23 PROCEDURE — 92526 ORAL FUNCTION THERAPY: CPT | Mod: GN

## 2025-04-30 ENCOUNTER — APPOINTMENT (OUTPATIENT)
Dept: OTOLARYNGOLOGY | Facility: CLINIC | Age: 75
End: 2025-04-30

## 2025-04-30 PROCEDURE — 92526 ORAL FUNCTION THERAPY: CPT | Mod: GN

## 2025-05-06 ENCOUNTER — OUTPATIENT (OUTPATIENT)
Dept: OUTPATIENT SERVICES | Facility: HOSPITAL | Age: 75
LOS: 1 days | End: 2025-05-06
Payer: MEDICARE

## 2025-05-06 ENCOUNTER — APPOINTMENT (OUTPATIENT)
Dept: NUCLEAR MEDICINE | Facility: CLINIC | Age: 75
End: 2025-05-06

## 2025-05-06 DIAGNOSIS — Z98.890 OTHER SPECIFIED POSTPROCEDURAL STATES: Chronic | ICD-10-CM

## 2025-05-06 DIAGNOSIS — Z98.52 VASECTOMY STATUS: Chronic | ICD-10-CM

## 2025-05-06 DIAGNOSIS — C03.9 MALIGNANT NEOPLASM OF GUM, UNSPECIFIED: ICD-10-CM

## 2025-05-06 DIAGNOSIS — Z98.89 OTHER SPECIFIED POSTPROCEDURAL STATES: Chronic | ICD-10-CM

## 2025-05-06 PROCEDURE — 78815 PET IMAGE W/CT SKULL-THIGH: CPT

## 2025-05-06 PROCEDURE — A9552: CPT

## 2025-05-06 PROCEDURE — 78815 PET IMAGE W/CT SKULL-THIGH: CPT | Mod: 26,PS

## 2025-05-07 ENCOUNTER — APPOINTMENT (OUTPATIENT)
Dept: PHYSICAL MEDICINE AND REHAB | Facility: CLINIC | Age: 75
End: 2025-05-07
Payer: MEDICARE

## 2025-05-07 PROCEDURE — 99204 OFFICE O/P NEW MOD 45 MIN: CPT

## 2025-05-09 DIAGNOSIS — K63.9 DISEASE OF INTESTINE, UNSPECIFIED: ICD-10-CM

## 2025-05-12 LAB
CANCER AG19-9 SERPL-ACNC: 9 U/ML
CEA SERPL-MCNC: 0.9 NG/ML

## 2025-05-14 ENCOUNTER — NON-APPOINTMENT (OUTPATIENT)
Age: 75
End: 2025-05-14

## 2025-05-14 ENCOUNTER — APPOINTMENT (OUTPATIENT)
Dept: OTOLARYNGOLOGY | Facility: CLINIC | Age: 75
End: 2025-05-14

## 2025-05-14 ENCOUNTER — APPOINTMENT (OUTPATIENT)
Dept: SURGICAL ONCOLOGY | Facility: CLINIC | Age: 75
End: 2025-05-14
Payer: MEDICARE

## 2025-05-14 VITALS
HEART RATE: 64 BPM | SYSTOLIC BLOOD PRESSURE: 144 MMHG | DIASTOLIC BLOOD PRESSURE: 80 MMHG | HEIGHT: 68 IN | OXYGEN SATURATION: 98 % | RESPIRATION RATE: 17 BRPM | WEIGHT: 174 LBS | BODY MASS INDEX: 26.37 KG/M2

## 2025-05-14 PROCEDURE — 99205 OFFICE O/P NEW HI 60 MIN: CPT

## 2025-05-14 PROCEDURE — 92526 ORAL FUNCTION THERAPY: CPT | Mod: GN

## 2025-05-15 ENCOUNTER — APPOINTMENT (OUTPATIENT)
Dept: SURGICAL ONCOLOGY | Facility: CLINIC | Age: 75
End: 2025-05-15
Payer: MEDICARE

## 2025-05-19 RX ORDER — PENTOXIFYLLINE 400 MG/1
400 TABLET, EXTENDED RELEASE ORAL TWICE DAILY
Qty: 60 | Refills: 6 | Status: ACTIVE | COMMUNITY
Start: 2025-05-19 | End: 1900-01-01

## 2025-05-19 RX ORDER — CYANOCOBALAMIN (VITAMIN B-12) 500 MCG
400 LOZENGE ORAL TWICE DAILY
Qty: 60 | Refills: 11 | Status: ACTIVE | COMMUNITY
Start: 2025-05-19 | End: 1900-01-01

## 2025-06-04 ENCOUNTER — APPOINTMENT (OUTPATIENT)
Dept: OTOLARYNGOLOGY | Facility: CLINIC | Age: 75
End: 2025-06-04
Payer: MEDICARE

## 2025-06-04 DIAGNOSIS — C06.9 MALIGNANT NEOPLASM OF MOUTH, UNSPECIFIED: ICD-10-CM

## 2025-06-04 PROCEDURE — 99214 OFFICE O/P EST MOD 30 MIN: CPT

## 2025-06-10 ENCOUNTER — APPOINTMENT (OUTPATIENT)
Dept: SURGICAL ONCOLOGY | Facility: CLINIC | Age: 75
End: 2025-06-10
Payer: MEDICARE

## 2025-06-10 VITALS
DIASTOLIC BLOOD PRESSURE: 70 MMHG | SYSTOLIC BLOOD PRESSURE: 130 MMHG | WEIGHT: 174 LBS | OXYGEN SATURATION: 98 % | HEART RATE: 62 BPM | HEIGHT: 68 IN | BODY MASS INDEX: 26.37 KG/M2

## 2025-06-10 PROCEDURE — 99213 OFFICE O/P EST LOW 20 MIN: CPT

## 2025-06-17 NOTE — H&P PST ADULT - HISTORY OF COVID-19 VACCINATION
----- Message from Itz Galan sent at 6/17/2025  4:21 PM CDT -----  One hour gtt is abnormal. Please contact patient to complete a fasting 3 hour gtt w/ A1C. I entered the orders  ----- Message -----  From: Lab, Background User  Sent: 6/17/2025   1:13 PM CDT  To: Itz Galan MD     Yes

## 2025-07-16 ENCOUNTER — APPOINTMENT (OUTPATIENT)
Dept: PHYSICAL MEDICINE AND REHAB | Facility: CLINIC | Age: 75
End: 2025-07-16
Payer: MEDICARE

## 2025-07-16 PROBLEM — I89.0 LYMPHEDEMA OF FACE: Status: ACTIVE | Noted: 2025-07-16

## 2025-07-16 PROCEDURE — 99214 OFFICE O/P EST MOD 30 MIN: CPT

## 2025-07-17 PROBLEM — E03.9 HYPOTHYROIDISM, UNSPECIFIED TYPE: Status: ACTIVE | Noted: 2025-07-17

## 2025-07-17 RX ORDER — LEVOTHYROXINE SODIUM 50 UG/1
50 TABLET ORAL
Refills: 0 | Status: ACTIVE | COMMUNITY

## 2025-08-13 ENCOUNTER — APPOINTMENT (OUTPATIENT)
Dept: ORTHOPEDIC SURGERY | Facility: CLINIC | Age: 75
End: 2025-08-13
Payer: MEDICARE

## 2025-08-13 DIAGNOSIS — M17.12 UNILATERAL PRIMARY OSTEOARTHRITIS, LEFT KNEE: ICD-10-CM

## 2025-08-13 PROCEDURE — 99204 OFFICE O/P NEW MOD 45 MIN: CPT

## 2025-08-13 PROCEDURE — 73562 X-RAY EXAM OF KNEE 3: CPT | Mod: LT

## 2025-08-21 ENCOUNTER — OUTPATIENT (OUTPATIENT)
Dept: OUTPATIENT SERVICES | Facility: HOSPITAL | Age: 75
LOS: 1 days | End: 2025-08-21
Payer: MEDICARE

## 2025-08-21 ENCOUNTER — APPOINTMENT (OUTPATIENT)
Dept: MRI IMAGING | Facility: CLINIC | Age: 75
End: 2025-08-21

## 2025-08-21 DIAGNOSIS — Z98.890 OTHER SPECIFIED POSTPROCEDURAL STATES: Chronic | ICD-10-CM

## 2025-08-21 DIAGNOSIS — Z98.890 OTHER SPECIFIED POSTPROCEDURAL STATES: ICD-10-CM

## 2025-08-21 DIAGNOSIS — Z98.89 OTHER SPECIFIED POSTPROCEDURAL STATES: Chronic | ICD-10-CM

## 2025-08-21 DIAGNOSIS — Z98.52 VASECTOMY STATUS: Chronic | ICD-10-CM

## 2025-08-21 PROCEDURE — 73721 MRI JNT OF LWR EXTRE W/O DYE: CPT | Mod: 26,LT

## 2025-08-21 PROCEDURE — 73721 MRI JNT OF LWR EXTRE W/O DYE: CPT

## 2025-08-27 ENCOUNTER — APPOINTMENT (OUTPATIENT)
Dept: OTOLARYNGOLOGY | Facility: CLINIC | Age: 75
End: 2025-08-27
Payer: MEDICARE

## 2025-08-27 VITALS
HEIGHT: 68 IN | SYSTOLIC BLOOD PRESSURE: 136 MMHG | HEART RATE: 64 BPM | DIASTOLIC BLOOD PRESSURE: 82 MMHG | BODY MASS INDEX: 27.28 KG/M2 | WEIGHT: 180 LBS | OXYGEN SATURATION: 98 %

## 2025-08-27 DIAGNOSIS — C06.9 MALIGNANT NEOPLASM OF MOUTH, UNSPECIFIED: ICD-10-CM

## 2025-08-27 PROCEDURE — 99214 OFFICE O/P EST MOD 30 MIN: CPT

## 2025-08-27 RX ORDER — BLOOD SUGAR DIAGNOSTIC
100 STRIP MISCELLANEOUS
Refills: 0 | Status: ACTIVE | COMMUNITY

## 2025-08-27 RX ORDER — TRIAMCINOLONE ACETONIDE 1 MG/G
0.1 CREAM TOPICAL
Refills: 0 | Status: ACTIVE | COMMUNITY

## 2025-08-27 RX ORDER — ROSUVASTATIN CALCIUM 5 MG/1
5 TABLET, FILM COATED ORAL
Refills: 0 | Status: ACTIVE | COMMUNITY

## 2025-09-02 ENCOUNTER — APPOINTMENT (OUTPATIENT)
Dept: ORTHOPEDIC SURGERY | Facility: CLINIC | Age: 75
End: 2025-09-02

## 2025-09-02 PROCEDURE — 99214 OFFICE O/P EST MOD 30 MIN: CPT | Mod: 25

## 2025-09-02 PROCEDURE — 20610 DRAIN/INJ JOINT/BURSA W/O US: CPT | Mod: LT

## 2025-09-02 RX ORDER — LIDOCAINE HYDROCHLORIDE 10 MG/ML
1 INJECTION, SOLUTION INFILTRATION; PERINEURAL
Refills: 0 | Status: COMPLETED | OUTPATIENT
Start: 2025-09-02

## 2025-09-02 RX ORDER — METHYLPRED ACET/NACL,ISO-OS/PF 40 MG/ML
40 VIAL (ML) INJECTION
Refills: 0 | Status: COMPLETED | OUTPATIENT
Start: 2025-09-02

## 2025-09-02 RX ADMIN — LIDOCAINE HYDROCHLORIDE 4 %: 10 INJECTION, SOLUTION INFILTRATION; PERINEURAL at 00:00

## 2025-09-02 RX ADMIN — METHYLPREDNISOLONE ACETATE 2 MG/ML: 40 INJECTION, SUSPENSION INTRA-ARTICULAR; INTRALESIONAL; INTRAMUSCULAR; SOFT TISSUE at 00:00

## (undated) DEVICE — CLAMP MICROVASCULAR SINGLE  1-2MM

## (undated) DEVICE — DRSG DERMABOND PRINEO 42CM

## (undated) DEVICE — DRAPE ARMATEC HANDLE 5" X 10"

## (undated) DEVICE — DRSG TELFA 4 X 8

## (undated) DEVICE — ELCTR GROUNDING PAD ADULT COVIDIEN

## (undated) DEVICE — SYR LUER LOK 10CC

## (undated) DEVICE — DRAPE MAGNETIC INSTRUMENT MEDIUM

## (undated) DEVICE — LIJ-ZIMMER MESHGRAFTER: Type: DURABLE MEDICAL EQUIPMENT

## (undated) DEVICE — GLV 7 PROTEXIS (WHITE)

## (undated) DEVICE — BASIN SET DOUBLE

## (undated) DEVICE — SUT SILK 2-0 24" TIES

## (undated) DEVICE — WARMING BLANKET FULL UNDERBODY

## (undated) DEVICE — TUBING SUCTION NONCONDUCTIVE 6MM X 12FT

## (undated) DEVICE — GOWN LG

## (undated) DEVICE — WARMING BLANKET UPPER ADULT

## (undated) DEVICE — TOURNIQUET CUFF 18" DUAL PORT SINGLE BLADDER LUER LOCK (BLACK)

## (undated) DEVICE — DRSG XEROFORM 5 X 9"

## (undated) DEVICE — ULTRASOUND GEL 0.25L

## (undated) DEVICE — ELCTR BOVIE TIP BLADE INSULATED 2.75" EDGE

## (undated) DEVICE — SUT SILK 2-0 18" FS

## (undated) DEVICE — GLV 7 PROTEXIS

## (undated) DEVICE — LABELS BLANK W PEN

## (undated) DEVICE — CLAMP MICROVASCULAR DOUBLE  1-2MM

## (undated) DEVICE — STAPLER SKIN MULTI DIRECTION W35

## (undated) DEVICE — DRAPE SPLIT SHEETS 77X120"

## (undated) DEVICE — CANISTER DISPOSABLE THIN WALL 3000CC

## (undated) DEVICE — PROTECTOR HEEL / ELBOW FLUFFY

## (undated) DEVICE — SOL IRR POUR NS 0.9% 1500ML

## (undated) DEVICE — FOLEY TRAY 16FR 5CC LF UMETER CLOSED

## (undated) DEVICE — PACK FREE FLAP

## (undated) DEVICE — PACK HEAD & NECK

## (undated) DEVICE — SPEAR SURG WECKCEL

## (undated) DEVICE — DRAPE 3/4 SHEET 52X76"

## (undated) DEVICE — MODEL SUPPLEMENTAL HALF IPS LP 00132

## (undated) DEVICE — NDL HYPO SAFE 22G X 1"

## (undated) DEVICE — Device

## (undated) DEVICE — SYR LUER LOK 5CC

## (undated) DEVICE — DRAPE SPLIT SHEET 77" X 120"

## (undated) DEVICE — SUT SILK 2-0 30" FSL

## (undated) DEVICE — SUT CHROMIC 3-0 27" RB-1

## (undated) DEVICE — SOL IRR POUR H2O 500ML

## (undated) DEVICE — POSITIONER STRAP ARMBOARD VELCRO TS-30

## (undated) DEVICE — DRAPE TOWEL BLUE 17" X 24"

## (undated) DEVICE — BLADE SURGICAL #11 CARBON

## (undated) DEVICE — SUT PROLENE 5-0 36" RB-1

## (undated) DEVICE — CANNULA ANT CHMBR 27GX22MM

## (undated) DEVICE — VAGINAL PACKING 2"

## (undated) DEVICE — STAPLER SKIN VISI-STAT 35 WIDE

## (undated) DEVICE — DRSG BIOPATCH DISK W CHG 1" W 4.0MM HOLE

## (undated) DEVICE — DRSG BENZOIN 0.6CC

## (undated) DEVICE — SUT SILK 2-0 18" TIES

## (undated) DEVICE — NDL HYPO SAFE 22G X 1" (BLACK)

## (undated) DEVICE — LABEL MED BLANK W PEN

## (undated) DEVICE — BIPOLAR FORCEP STRYKER STANDARD 7" X 1MM (YELLOW)

## (undated) DEVICE — DRSG ALLEVYN LIFE 4 X 8 (PINK)

## (undated) DEVICE — WARMING BLANKET LOWER ADULT

## (undated) DEVICE — DRAPE INSTRUMENT POUCH

## (undated) DEVICE — DOPPLER PROBE  CABLE

## (undated) DEVICE — DRSG CURITY GAUZE SPONGE 4 X 4" 12-PLY

## (undated) DEVICE — WARMING BLANKET FULL ADULT

## (undated) DEVICE — SUT CHROMIC 4-0 27" SH

## (undated) DEVICE — VENODYNE/SCD SLEEVE CALF MEDIUM

## (undated) DEVICE — DRSG TELFA 3 X 8

## (undated) DEVICE — ELCTR BOVIE TIP BLADE INSULATED 6.5" EDGE

## (undated) DEVICE — DRSG STERISTRIPS 0.25 X 4"

## (undated) DEVICE — TWIST DRILL 1.5MM DIA X 50MM W/NOTCH SGL USE ONLY

## (undated) DEVICE — DRAIN RESERVOIR FOR JACKSON PRATT 100CC CARDINAL

## (undated) DEVICE — SYR LUER LOK 3CC

## (undated) DEVICE — WRAP COMPRESSION CALF MED

## (undated) DEVICE — ELCTR PENCIL SMOKE EVACUATION

## (undated) DEVICE — RUBBER BANDS STERILE

## (undated) DEVICE — SUT ETHILON 6-0 18" PS-3

## (undated) DEVICE — DRSG TELFA 2 X 3

## (undated) DEVICE — LONE STAR RETRACTOR RING 12MM BLUNT DISP

## (undated) DEVICE — SUT SILK 3-0 18" TIES

## (undated) DEVICE — SAW BLADE STRYKER PRECISION EXT THIN 0.38X27MM

## (undated) DEVICE — ELCTR BOVIE PENCIL SMOKE EVACUATION

## (undated) DEVICE — TRACH TIE MEDIUM

## (undated) DEVICE — DRSG WEBRIL 6"

## (undated) DEVICE — LUBRICATING JELLY ONESHOT 1.25OZ

## (undated) DEVICE — DRAPE INSTRUMENT POUCH 6.75" X 11"

## (undated) DEVICE — BLANKET WARMER LOWER ADULT

## (undated) DEVICE — LAP PAD W RING 18 X 18"

## (undated) DEVICE — SOL IRR POUR H2O 1500ML

## (undated) DEVICE — BIPOLAR FORCEP CORD WECK STANDARD 12FT

## (undated) DEVICE — ELCTR EDGE BOVIE INSULATED W SAFETY BLADE 2.8"

## (undated) DEVICE — SUT VICRYL 4-0 27" RB-1 UNDYED

## (undated) DEVICE — SUT MONOCRYL 3-0 27" PS-2 UNDYED

## (undated) DEVICE — SAFETY PIN

## (undated) DEVICE — PREP BETADINE KIT

## (undated) DEVICE — DRAPE SPLIT SHEET 77" X 108"

## (undated) DEVICE — DRAPE SOL WARMING 44X44IN

## (undated) DEVICE — MODEL SUPPLEMENTAL IPS VERIFICATION LP 00010

## (undated) DEVICE — DRSG ACE BANDAGE 6"

## (undated) DEVICE — DRSG KERLIX ROLL 4.5"

## (undated) DEVICE — BRIDGE VERIF SCREW RETAIN PMMA W/PINK COMPOS

## (undated) DEVICE — SUT VICRYL 3-0 27" SH UNDYED

## (undated) DEVICE — SPLINT IPS ORTHOG SNGL JAW LP

## (undated) DEVICE — NERVE STIMULATOR/LOCATOR HEAD AND NECK MODEL

## (undated) DEVICE — SUT SILK 2-0 30" SH

## (undated) DEVICE — SUT VICRYL 2-0 27" CT-1 UNDYED

## (undated) DEVICE — MERCIAN VISABILITY BACKROUND YELLOW

## (undated) DEVICE — ELCTR BOVIE TIP BLADE INSULATED 2.8" EDGE WITH SAFETY

## (undated) DEVICE — DRSG TEGADERM 4X4.75"

## (undated) DEVICE — FRAZIER SUCTION TIP 8FR

## (undated) DEVICE — TOURNIQUET ESMARK 6"

## (undated) DEVICE — NDL COUNTER FOAM AND MAGNET 20-40

## (undated) DEVICE — DRSG STOCKINETTE IMPERVIOUS LG

## (undated) DEVICE — DRSG KLING 4"

## (undated) DEVICE — DRAPE SURGICAL #1010